# Patient Record
Sex: FEMALE | Race: WHITE | Employment: OTHER | URBAN - METROPOLITAN AREA
[De-identification: names, ages, dates, MRNs, and addresses within clinical notes are randomized per-mention and may not be internally consistent; named-entity substitution may affect disease eponyms.]

---

## 2022-10-10 ENCOUNTER — HOSPITAL ENCOUNTER (INPATIENT)
Age: 87
LOS: 4 days | Discharge: NURSING, REHAB OR LONG-TERM CARE | DRG: 483 | End: 2022-10-14
Attending: STUDENT IN AN ORGANIZED HEALTH CARE EDUCATION/TRAINING PROGRAM | Admitting: FAMILY MEDICINE
Payer: MEDICARE

## 2022-10-10 ENCOUNTER — APPOINTMENT (OUTPATIENT)
Dept: CT IMAGING | Age: 87
DRG: 483 | End: 2022-10-10
Payer: MEDICARE

## 2022-10-10 ENCOUNTER — APPOINTMENT (OUTPATIENT)
Dept: GENERAL RADIOLOGY | Age: 87
DRG: 483 | End: 2022-10-10
Payer: MEDICARE

## 2022-10-10 DIAGNOSIS — E87.1 HYPONATREMIA: Primary | ICD-10-CM

## 2022-10-10 DIAGNOSIS — S42.92XA CLOSED FRACTURE OF LEFT SHOULDER, INITIAL ENCOUNTER: ICD-10-CM

## 2022-10-10 PROBLEM — S42.213A: Status: ACTIVE | Noted: 2022-10-10

## 2022-10-10 LAB
A/G RATIO: 1.2 (ref 1.1–2.2)
ABO/RH: NORMAL
ALBUMIN SERPL-MCNC: 3.8 G/DL (ref 3.4–5)
ALP BLD-CCNC: 69 U/L (ref 40–129)
ALT SERPL-CCNC: 11 U/L (ref 10–40)
ANION GAP SERPL CALCULATED.3IONS-SCNC: 10 MMOL/L (ref 3–16)
ANTIBODY SCREEN: NORMAL
AST SERPL-CCNC: 16 U/L (ref 15–37)
BACTERIA: NORMAL /HPF
BASOPHILS ABSOLUTE: 0.1 K/UL (ref 0–0.2)
BASOPHILS RELATIVE PERCENT: 0.6 %
BILIRUB SERPL-MCNC: 0.4 MG/DL (ref 0–1)
BILIRUBIN URINE: NEGATIVE
BLOOD, URINE: NEGATIVE
BUN BLDV-MCNC: 15 MG/DL (ref 7–20)
CALCIUM SERPL-MCNC: 9.7 MG/DL (ref 8.3–10.6)
CHLORIDE BLD-SCNC: 91 MMOL/L (ref 99–110)
CLARITY: CLEAR
CO2: 25 MMOL/L (ref 21–32)
COLOR: YELLOW
CREAT SERPL-MCNC: <0.5 MG/DL (ref 0.6–1.2)
EKG ATRIAL RATE: 63 BPM
EKG DIAGNOSIS: NORMAL
EKG P AXIS: 51 DEGREES
EKG P-R INTERVAL: 220 MS
EKG Q-T INTERVAL: 490 MS
EKG QRS DURATION: 82 MS
EKG QTC CALCULATION (BAZETT): 501 MS
EKG R AXIS: -15 DEGREES
EKG T AXIS: 64 DEGREES
EKG VENTRICULAR RATE: 63 BPM
EOSINOPHILS ABSOLUTE: 0 K/UL (ref 0–0.6)
EOSINOPHILS RELATIVE PERCENT: 0.3 %
EPITHELIAL CELLS, UA: 2 /HPF (ref 0–5)
GFR AFRICAN AMERICAN: >60
GFR NON-AFRICAN AMERICAN: >60
GLUCOSE BLD-MCNC: 143 MG/DL (ref 70–99)
GLUCOSE URINE: NEGATIVE MG/DL
HCT VFR BLD CALC: 35.9 % (ref 36–48)
HEMOGLOBIN: 11.9 G/DL (ref 12–16)
HYALINE CASTS: 0 /LPF (ref 0–8)
INR BLD: 0.93 (ref 0.87–1.14)
KETONES, URINE: 15 MG/DL
LEUKOCYTE ESTERASE, URINE: ABNORMAL
LYMPHOCYTES ABSOLUTE: 1.5 K/UL (ref 1–5.1)
LYMPHOCYTES RELATIVE PERCENT: 15.3 %
MAGNESIUM: 1.8 MG/DL (ref 1.8–2.4)
MCH RBC QN AUTO: 29.3 PG (ref 26–34)
MCHC RBC AUTO-ENTMCNC: 33 G/DL (ref 31–36)
MCV RBC AUTO: 88.5 FL (ref 80–100)
MICROSCOPIC EXAMINATION: YES
MONOCYTES ABSOLUTE: 0.4 K/UL (ref 0–1.3)
MONOCYTES RELATIVE PERCENT: 4.2 %
NEUTROPHILS ABSOLUTE: 7.9 K/UL (ref 1.7–7.7)
NEUTROPHILS RELATIVE PERCENT: 79.6 %
NITRITE, URINE: NEGATIVE
PDW BLD-RTO: 14.5 % (ref 12.4–15.4)
PH UA: 6 (ref 5–8)
PLATELET # BLD: 199 K/UL (ref 135–450)
PMV BLD AUTO: 6.7 FL (ref 5–10.5)
POTASSIUM REFLEX MAGNESIUM: 4.1 MMOL/L (ref 3.5–5.1)
PROTEIN UA: NEGATIVE MG/DL
PROTHROMBIN TIME: 12.4 SEC (ref 11.7–14.5)
RBC # BLD: 4.06 M/UL (ref 4–5.2)
RBC UA: 1 /HPF (ref 0–4)
SODIUM BLD-SCNC: 126 MMOL/L (ref 136–145)
SODIUM BLD-SCNC: 127 MMOL/L (ref 136–145)
SPECIFIC GRAVITY UA: 1.02 (ref 1–1.03)
TOTAL CK: 100 U/L (ref 26–192)
TOTAL PROTEIN: 6.9 G/DL (ref 6.4–8.2)
TROPONIN: <0.01 NG/ML
URINE REFLEX TO CULTURE: ABNORMAL
URINE TYPE: ABNORMAL
UROBILINOGEN, URINE: 1 E.U./DL
WBC # BLD: 10 K/UL (ref 4–11)
WBC UA: 2 /HPF (ref 0–5)

## 2022-10-10 PROCEDURE — 85610 PROTHROMBIN TIME: CPT

## 2022-10-10 PROCEDURE — 86901 BLOOD TYPING SEROLOGIC RH(D): CPT

## 2022-10-10 PROCEDURE — 81001 URINALYSIS AUTO W/SCOPE: CPT

## 2022-10-10 PROCEDURE — 6360000002 HC RX W HCPCS: Performed by: NURSE PRACTITIONER

## 2022-10-10 PROCEDURE — 6360000002 HC RX W HCPCS: Performed by: FAMILY MEDICINE

## 2022-10-10 PROCEDURE — 70450 CT HEAD/BRAIN W/O DYE: CPT

## 2022-10-10 PROCEDURE — 86850 RBC ANTIBODY SCREEN: CPT

## 2022-10-10 PROCEDURE — 2580000003 HC RX 258: Performed by: FAMILY MEDICINE

## 2022-10-10 PROCEDURE — 73200 CT UPPER EXTREMITY W/O DYE: CPT

## 2022-10-10 PROCEDURE — 83735 ASSAY OF MAGNESIUM: CPT

## 2022-10-10 PROCEDURE — 99221 1ST HOSP IP/OBS SF/LOW 40: CPT | Performed by: NURSE PRACTITIONER

## 2022-10-10 PROCEDURE — 80053 COMPREHEN METABOLIC PANEL: CPT

## 2022-10-10 PROCEDURE — 73030 X-RAY EXAM OF SHOULDER: CPT

## 2022-10-10 PROCEDURE — 84484 ASSAY OF TROPONIN QUANT: CPT

## 2022-10-10 PROCEDURE — 36415 COLL VENOUS BLD VENIPUNCTURE: CPT

## 2022-10-10 PROCEDURE — 84295 ASSAY OF SERUM SODIUM: CPT

## 2022-10-10 PROCEDURE — 96374 THER/PROPH/DIAG INJ IV PUSH: CPT

## 2022-10-10 PROCEDURE — 72125 CT NECK SPINE W/O DYE: CPT

## 2022-10-10 PROCEDURE — 86900 BLOOD TYPING SEROLOGIC ABO: CPT

## 2022-10-10 PROCEDURE — 1200000000 HC SEMI PRIVATE

## 2022-10-10 PROCEDURE — 6360000002 HC RX W HCPCS: Performed by: STUDENT IN AN ORGANIZED HEALTH CARE EDUCATION/TRAINING PROGRAM

## 2022-10-10 PROCEDURE — 73502 X-RAY EXAM HIP UNI 2-3 VIEWS: CPT

## 2022-10-10 PROCEDURE — 85025 COMPLETE CBC W/AUTO DIFF WBC: CPT

## 2022-10-10 PROCEDURE — 99285 EMERGENCY DEPT VISIT HI MDM: CPT

## 2022-10-10 PROCEDURE — 93010 ELECTROCARDIOGRAM REPORT: CPT | Performed by: INTERNAL MEDICINE

## 2022-10-10 PROCEDURE — 6370000000 HC RX 637 (ALT 250 FOR IP): Performed by: STUDENT IN AN ORGANIZED HEALTH CARE EDUCATION/TRAINING PROGRAM

## 2022-10-10 PROCEDURE — 93005 ELECTROCARDIOGRAM TRACING: CPT | Performed by: STUDENT IN AN ORGANIZED HEALTH CARE EDUCATION/TRAINING PROGRAM

## 2022-10-10 PROCEDURE — 82550 ASSAY OF CK (CPK): CPT

## 2022-10-10 RX ORDER — SODIUM CHLORIDE 9 MG/ML
INJECTION, SOLUTION INTRAVENOUS PRN
Status: DISCONTINUED | OUTPATIENT
Start: 2022-10-10 | End: 2022-10-14 | Stop reason: HOSPADM

## 2022-10-10 RX ORDER — ACETAMINOPHEN 325 MG/1
650 TABLET ORAL ONCE
Status: COMPLETED | OUTPATIENT
Start: 2022-10-10 | End: 2022-10-10

## 2022-10-10 RX ORDER — ONDANSETRON 4 MG/1
4 TABLET, ORALLY DISINTEGRATING ORAL EVERY 8 HOURS PRN
Status: DISCONTINUED | OUTPATIENT
Start: 2022-10-10 | End: 2022-10-14 | Stop reason: HOSPADM

## 2022-10-10 RX ORDER — SODIUM CHLORIDE 0.9 % (FLUSH) 0.9 %
5-40 SYRINGE (ML) INJECTION EVERY 12 HOURS SCHEDULED
Status: DISCONTINUED | OUTPATIENT
Start: 2022-10-10 | End: 2022-10-14 | Stop reason: HOSPADM

## 2022-10-10 RX ORDER — POLYETHYLENE GLYCOL 3350 17 G/17G
17 POWDER, FOR SOLUTION ORAL DAILY PRN
Status: DISCONTINUED | OUTPATIENT
Start: 2022-10-10 | End: 2022-10-14 | Stop reason: HOSPADM

## 2022-10-10 RX ORDER — ACETAMINOPHEN 650 MG/1
650 SUPPOSITORY RECTAL EVERY 6 HOURS PRN
Status: DISCONTINUED | OUTPATIENT
Start: 2022-10-10 | End: 2022-10-14 | Stop reason: HOSPADM

## 2022-10-10 RX ORDER — ACETAMINOPHEN 325 MG/1
650 TABLET ORAL EVERY 6 HOURS PRN
Status: DISCONTINUED | OUTPATIENT
Start: 2022-10-10 | End: 2022-10-14 | Stop reason: HOSPADM

## 2022-10-10 RX ORDER — ENOXAPARIN SODIUM 100 MG/ML
40 INJECTION SUBCUTANEOUS DAILY
Status: DISCONTINUED | OUTPATIENT
Start: 2022-10-11 | End: 2022-10-11

## 2022-10-10 RX ORDER — MORPHINE SULFATE 2 MG/ML
2 INJECTION, SOLUTION INTRAMUSCULAR; INTRAVENOUS
Status: DISCONTINUED | OUTPATIENT
Start: 2022-10-10 | End: 2022-10-11

## 2022-10-10 RX ORDER — ONDANSETRON 2 MG/ML
4 INJECTION INTRAMUSCULAR; INTRAVENOUS EVERY 6 HOURS PRN
Status: DISCONTINUED | OUTPATIENT
Start: 2022-10-10 | End: 2022-10-14 | Stop reason: HOSPADM

## 2022-10-10 RX ORDER — MORPHINE SULFATE 2 MG/ML
2 INJECTION, SOLUTION INTRAMUSCULAR; INTRAVENOUS EVERY 4 HOURS PRN
Status: DISCONTINUED | OUTPATIENT
Start: 2022-10-10 | End: 2022-10-10

## 2022-10-10 RX ORDER — MORPHINE SULFATE 4 MG/ML
4 INJECTION, SOLUTION INTRAMUSCULAR; INTRAVENOUS
Status: DISCONTINUED | OUTPATIENT
Start: 2022-10-10 | End: 2022-10-11

## 2022-10-10 RX ORDER — MORPHINE SULFATE 4 MG/ML
4 INJECTION, SOLUTION INTRAMUSCULAR; INTRAVENOUS ONCE
Status: COMPLETED | OUTPATIENT
Start: 2022-10-10 | End: 2022-10-10

## 2022-10-10 RX ORDER — SODIUM CHLORIDE 0.9 % (FLUSH) 0.9 %
5-40 SYRINGE (ML) INJECTION PRN
Status: DISCONTINUED | OUTPATIENT
Start: 2022-10-10 | End: 2022-10-14 | Stop reason: HOSPADM

## 2022-10-10 RX ORDER — MORPHINE SULFATE 2 MG/ML
2 INJECTION, SOLUTION INTRAMUSCULAR; INTRAVENOUS
Status: DISCONTINUED | OUTPATIENT
Start: 2022-10-10 | End: 2022-10-10

## 2022-10-10 RX ADMIN — MORPHINE SULFATE 4 MG: 4 INJECTION, SOLUTION INTRAMUSCULAR; INTRAVENOUS at 23:09

## 2022-10-10 RX ADMIN — MORPHINE SULFATE 4 MG: 4 INJECTION, SOLUTION INTRAMUSCULAR; INTRAVENOUS at 16:50

## 2022-10-10 RX ADMIN — MORPHINE SULFATE 2 MG: 2 INJECTION, SOLUTION INTRAMUSCULAR; INTRAVENOUS at 20:24

## 2022-10-10 RX ADMIN — Medication 10 ML: at 20:30

## 2022-10-10 RX ADMIN — ACETAMINOPHEN 650 MG: 325 TABLET ORAL at 14:11

## 2022-10-10 ASSESSMENT — PAIN - FUNCTIONAL ASSESSMENT
PAIN_FUNCTIONAL_ASSESSMENT: 0-10
PAIN_FUNCTIONAL_ASSESSMENT: PREVENTS OR INTERFERES SOME ACTIVE ACTIVITIES AND ADLS

## 2022-10-10 ASSESSMENT — PAIN DESCRIPTION - ORIENTATION
ORIENTATION: LEFT
ORIENTATION: LEFT;RIGHT
ORIENTATION: LEFT
ORIENTATION: LEFT

## 2022-10-10 ASSESSMENT — ENCOUNTER SYMPTOMS
SHORTNESS OF BREATH: 0
VOMITING: 0
SORE THROAT: 0
NAUSEA: 0
SINUS PRESSURE: 0
ABDOMINAL PAIN: 0
COUGH: 0
PHOTOPHOBIA: 0

## 2022-10-10 ASSESSMENT — PAIN DESCRIPTION - LOCATION
LOCATION: SHOULDER
LOCATION: HIP;SHOULDER
LOCATION: KNEE;ARM
LOCATION: SHOULDER

## 2022-10-10 ASSESSMENT — PAIN SCALES - GENERAL
PAINLEVEL_OUTOF10: 2
PAINLEVEL_OUTOF10: 9
PAINLEVEL_OUTOF10: 8
PAINLEVEL_OUTOF10: 9
PAINLEVEL_OUTOF10: 3
PAINLEVEL_OUTOF10: 8

## 2022-10-10 ASSESSMENT — PAIN DESCRIPTION - FREQUENCY: FREQUENCY: CONTINUOUS

## 2022-10-10 ASSESSMENT — PAIN DESCRIPTION - PAIN TYPE: TYPE: ACUTE PAIN

## 2022-10-10 ASSESSMENT — PAIN DESCRIPTION - ONSET: ONSET: ON-GOING

## 2022-10-10 ASSESSMENT — PAIN DESCRIPTION - DESCRIPTORS
DESCRIPTORS: ACHING
DESCRIPTORS: ACHING

## 2022-10-10 NOTE — CONSULTS
[facility]  Consult Note    Patient: Narciso Yuen  Admit Date: 10/10/2022  Requesting Physician: Raymund Hashimoto, MD  Room: ED-0015/15    Chief complaint: Left shoulder pain    HPI: Narciso Yuen is a 80 y.o. female who presented to [facility] ER today with complaints of left shoulder pain after she fell in her bathroom. Her daughter is at bedside today who found her on the floor. Describes pain in the left shoulder of moderate to severe intensity and of sharp nature since the fall earlier today which is relieved by fentanyl partially. Denies new numbness/tingling. Imaging review of left shoulder via plain films and CT scan demonstrated: A displaced fracture of the surgical neck involving the greater tuberosity. Patient lives in a private one-story home with her daughter and uses a walker to ambulate. She is right-hand dominant  Takes ASA 81 mg daily. She is a history of hyponatremia and follows with Dr. Gavin Upton. Sodium 126 today. Medical History:  Past Medical History:   Diagnosis Date    CHF (congestive heart failure) ([facility] [address]     Hypertension      Past Surgical History:   Procedure Laterality Date     SECTION      CHOLECYSTECTOMY         Social History:    reports that she has never smoked. She has never used smokeless tobacco.    Family History:  History reviewed. No pertinent family history. Medications:  ALL MEDICATIONS HAVE BEEN REVIEWED:  Scheduled:   tetanus & diphtheria toxoids (adult)  0.5 mL IntraMUSCular Once     Continuous:  PRN:morphine    Allergies:    Allergies   Allergen Reactions    Azithromycin Hives and Other (See Comments)    Hydrocodone-Acetaminophen Diarrhea, Nausea Only, Other (See Comments) and Nausea And Vomiting    Strawberry (Diagnostic) Hives    Adhesive Tape Other (See Comments)    Strawberry Other (See Comments)    Tramadol Nausea Only and Other (See Comments)     \"Turned patient into a zombie\"  Turned pt into a zombie Review of Systems:  Constitutional: Negative for fever, chills, fatigue. Skin:  Negative for pruritis, rash  Eyes: Negative for photophobia and visual disturbance. ENT:  Negative for rhinorrhea, epistaxis, sore throat  Respiratory:  Negative for cough and shortness of breath. Cardiovascular: Negative for chest pain. Gastrointestinal: Negative for nausea, vomiting, diarrhea. Genitourinary: Negative for dysuria and difficulty urinating. Neurological: Negative for confusion, dysarthria, tremors, seizures. Psychiatric:  Negative for depression or anxiety  Musculoskeletal:  Positive for left shoulder pain    Objective:  Vitals:    10/10/22 1415   BP:    Pulse: 65   Resp: 21   Temp:    SpO2: 94%      Physical Examination:  GENERAL: No apparent distress, well-nourished  SKIN:  Warm and dry  EYES: Nonicteric. ENT: Mucous membranes moist  HEAD: Normocephalic, atraumatic  RESPIRATORY: Resp easy and unlabored  CARDIOVASCULAR: Regular rate and rhythm  GI: Abdomen soft, nontender  NEURO: Awake and alert. No speech defect  PSYCHIATRIC: Appropriate affect; not agitated  MUSCULOSKELETAL: Left shoulder  Inspection: On exam there are no ulcerations, rashes or lesions about the left shoulder. There is pain to palpation of the left shoulder. There is ecchymosis noted anteriorly to the left shoulder with obvious swelling. No open areas or abrasions noted. Compartments are all soft and compressible. Motor: Intact left wrist flexion extension and flexion extension of all digits as well as thumb abduction on the left. Shoulder range of motion deferred. Sling in place. Sensation: Grossly intact to light touch throughout the left upper extremity in all nerve distributions. Vascular: 2+ left radial pulse.     Labs reviewed:  Recent Labs     10/10/22  1400   WBC 10.0   HGB 11.9*   HCT 35.9*        Recent Labs     10/10/22  1400   *   K 4.1   CL 91*   CO2 25   BUN 15   CREATININE <0.5*   GLUCOSE 143* Expect SNF postoperatively. Patient denies tobacco use. I have reviewed imaging and plan with Dr. Lady Sanitago.       Kailey Payment, APRN - CNP  10/10/2022  3:29 PM

## 2022-10-10 NOTE — ED NOTES
Assumed care of pt, pt medicated with tylenol  Swelling and bruising noted to left shoulder after a fall  Pt AAO x 3. Daughter at bedside.       Emily Shah RN  10/10/22 7349

## 2022-10-11 LAB
ANION GAP SERPL CALCULATED.3IONS-SCNC: 11 MMOL/L (ref 3–16)
ANION GAP SERPL CALCULATED.3IONS-SCNC: 8 MMOL/L (ref 3–16)
BUN BLDV-MCNC: 10 MG/DL (ref 7–20)
BUN BLDV-MCNC: 12 MG/DL (ref 7–20)
CALCIUM SERPL-MCNC: 9.2 MG/DL (ref 8.3–10.6)
CALCIUM SERPL-MCNC: 9.4 MG/DL (ref 8.3–10.6)
CHLORIDE BLD-SCNC: 90 MMOL/L (ref 99–110)
CHLORIDE BLD-SCNC: 92 MMOL/L (ref 99–110)
CO2: 25 MMOL/L (ref 21–32)
CO2: 27 MMOL/L (ref 21–32)
CREAT SERPL-MCNC: <0.5 MG/DL (ref 0.6–1.2)
CREAT SERPL-MCNC: <0.5 MG/DL (ref 0.6–1.2)
GFR AFRICAN AMERICAN: >60
GFR AFRICAN AMERICAN: >60
GFR NON-AFRICAN AMERICAN: >60
GFR NON-AFRICAN AMERICAN: >60
GLUCOSE BLD-MCNC: 102 MG/DL (ref 70–99)
GLUCOSE BLD-MCNC: 95 MG/DL (ref 70–99)
HCT VFR BLD CALC: 32.3 % (ref 36–48)
HEMOGLOBIN: 10.6 G/DL (ref 12–16)
MCH RBC QN AUTO: 29.4 PG (ref 26–34)
MCHC RBC AUTO-ENTMCNC: 32.7 G/DL (ref 31–36)
MCV RBC AUTO: 90 FL (ref 80–100)
PDW BLD-RTO: 14.3 % (ref 12.4–15.4)
PLATELET # BLD: 188 K/UL (ref 135–450)
PMV BLD AUTO: 6.8 FL (ref 5–10.5)
POTASSIUM SERPL-SCNC: 4.1 MMOL/L (ref 3.5–5.1)
POTASSIUM SERPL-SCNC: 4.8 MMOL/L (ref 3.5–5.1)
RBC # BLD: 3.59 M/UL (ref 4–5.2)
SODIUM BLD-SCNC: 125 MMOL/L (ref 136–145)
SODIUM BLD-SCNC: 128 MMOL/L (ref 136–145)
WBC # BLD: 6.2 K/UL (ref 4–11)

## 2022-10-11 PROCEDURE — 94760 N-INVAS EAR/PLS OXIMETRY 1: CPT

## 2022-10-11 PROCEDURE — 6370000000 HC RX 637 (ALT 250 FOR IP): Performed by: INTERNAL MEDICINE

## 2022-10-11 PROCEDURE — 85027 COMPLETE CBC AUTOMATED: CPT

## 2022-10-11 PROCEDURE — 6360000002 HC RX W HCPCS: Performed by: FAMILY MEDICINE

## 2022-10-11 PROCEDURE — 6360000002 HC RX W HCPCS: Performed by: NURSE PRACTITIONER

## 2022-10-11 PROCEDURE — APPNB45 APP NON BILLABLE 31-45 MINUTES: Performed by: NURSE PRACTITIONER

## 2022-10-11 PROCEDURE — 1200000000 HC SEMI PRIVATE

## 2022-10-11 PROCEDURE — 6370000000 HC RX 637 (ALT 250 FOR IP): Performed by: NURSE PRACTITIONER

## 2022-10-11 PROCEDURE — 80048 BASIC METABOLIC PNL TOTAL CA: CPT

## 2022-10-11 PROCEDURE — 2580000003 HC RX 258: Performed by: FAMILY MEDICINE

## 2022-10-11 PROCEDURE — 36415 COLL VENOUS BLD VENIPUNCTURE: CPT

## 2022-10-11 RX ORDER — ACETAMINOPHEN 500 MG
1000 TABLET ORAL 3 TIMES DAILY
Status: DISCONTINUED | OUTPATIENT
Start: 2022-10-11 | End: 2022-10-14 | Stop reason: HOSPADM

## 2022-10-11 RX ORDER — OXYCODONE HYDROCHLORIDE 5 MG/1
5 TABLET ORAL EVERY 4 HOURS PRN
Status: DISCONTINUED | OUTPATIENT
Start: 2022-10-11 | End: 2022-10-14

## 2022-10-11 RX ORDER — MORPHINE SULFATE 2 MG/ML
2 INJECTION, SOLUTION INTRAMUSCULAR; INTRAVENOUS EVERY 4 HOURS PRN
Status: DISCONTINUED | OUTPATIENT
Start: 2022-10-11 | End: 2022-10-14

## 2022-10-11 RX ORDER — POTASSIUM CHLORIDE 750 MG/1
10 CAPSULE, EXTENDED RELEASE ORAL DAILY
Status: ON HOLD | COMMUNITY
End: 2022-10-25 | Stop reason: HOSPADM

## 2022-10-11 RX ORDER — SODIUM CHLORIDE 1000 MG
1 TABLET, SOLUBLE MISCELLANEOUS
Status: DISCONTINUED | OUTPATIENT
Start: 2022-10-11 | End: 2022-10-12

## 2022-10-11 RX ORDER — MAGNESIUM OXIDE 400 MG/1
400 TABLET ORAL 2 TIMES DAILY
COMMUNITY

## 2022-10-11 RX ORDER — ASPIRIN 81 MG/1
81 TABLET ORAL DAILY
COMMUNITY

## 2022-10-11 RX ORDER — DOCUSATE SODIUM 100 MG/1
100 CAPSULE, LIQUID FILLED ORAL 2 TIMES DAILY
COMMUNITY

## 2022-10-11 RX ORDER — CETIRIZINE HYDROCHLORIDE 10 MG/1
5 TABLET ORAL DAILY
Status: DISCONTINUED | OUTPATIENT
Start: 2022-10-11 | End: 2022-10-14 | Stop reason: HOSPADM

## 2022-10-11 RX ORDER — LORATADINE 10 MG/1
10 TABLET ORAL DAILY
COMMUNITY
Start: 2020-06-11

## 2022-10-11 RX ORDER — LIDOCAINE 50 MG/G
1 PATCH TOPICAL DAILY
Status: ON HOLD | COMMUNITY
End: 2022-10-25 | Stop reason: HOSPADM

## 2022-10-11 RX ORDER — MORPHINE SULFATE 4 MG/ML
4 INJECTION, SOLUTION INTRAMUSCULAR; INTRAVENOUS EVERY 4 HOURS PRN
Status: DISCONTINUED | OUTPATIENT
Start: 2022-10-11 | End: 2022-10-14

## 2022-10-11 RX ORDER — NIFEDIPINE 30 MG/1
30 TABLET, FILM COATED, EXTENDED RELEASE ORAL DAILY
Status: DISCONTINUED | OUTPATIENT
Start: 2022-10-11 | End: 2022-10-14

## 2022-10-11 RX ORDER — VITAMIN B COMPLEX
1 CAPSULE ORAL DAILY
COMMUNITY

## 2022-10-11 RX ORDER — NIFEDIPINE 30 MG/1
30 TABLET, FILM COATED, EXTENDED RELEASE ORAL DAILY
COMMUNITY

## 2022-10-11 RX ORDER — FUROSEMIDE 20 MG/1
20 TABLET ORAL DAILY PRN
Status: ON HOLD | COMMUNITY
End: 2022-10-14 | Stop reason: HOSPADM

## 2022-10-11 RX ORDER — LORATADINE 10 MG/1
10 TABLET ORAL DAILY
Status: DISCONTINUED | OUTPATIENT
Start: 2022-10-11 | End: 2022-10-11 | Stop reason: CLARIF

## 2022-10-11 RX ADMIN — CETIRIZINE HYDROCHLORIDE 5 MG: 10 TABLET, FILM COATED ORAL at 12:05

## 2022-10-11 RX ADMIN — SODIUM CHLORIDE TAB 1 GM 1 G: 1 TAB at 17:45

## 2022-10-11 RX ADMIN — MORPHINE SULFATE 4 MG: 4 INJECTION, SOLUTION INTRAMUSCULAR; INTRAVENOUS at 10:44

## 2022-10-11 RX ADMIN — ACETAMINOPHEN 1000 MG: 500 TABLET ORAL at 13:52

## 2022-10-11 RX ADMIN — NIFEDIPINE 30 MG: 30 TABLET, EXTENDED RELEASE ORAL at 13:52

## 2022-10-11 RX ADMIN — ONDANSETRON 4 MG: 2 INJECTION INTRAMUSCULAR; INTRAVENOUS at 11:41

## 2022-10-11 RX ADMIN — Medication 10 ML: at 08:10

## 2022-10-11 RX ADMIN — OXYCODONE 5 MG: 5 TABLET ORAL at 15:21

## 2022-10-11 RX ADMIN — Medication 10 ML: at 21:35

## 2022-10-11 RX ADMIN — MORPHINE SULFATE 4 MG: 4 INJECTION, SOLUTION INTRAMUSCULAR; INTRAVENOUS at 05:05

## 2022-10-11 RX ADMIN — SODIUM CHLORIDE TAB 1 GM 1 G: 1 TAB at 12:05

## 2022-10-11 ASSESSMENT — PAIN DESCRIPTION - LOCATION
LOCATION: ARM
LOCATION: SHOULDER
LOCATION: SHOULDER;ARM;HIP
LOCATION: SHOULDER
LOCATION: ARM
LOCATION: SHOULDER
LOCATION: SHOULDER

## 2022-10-11 ASSESSMENT — ENCOUNTER SYMPTOMS
SHORTNESS OF BREATH: 0
SORE THROAT: 0
EYE PAIN: 0
ABDOMINAL PAIN: 0
COUGH: 0
WHEEZING: 0
BACK PAIN: 0
EYE REDNESS: 0
VOMITING: 0
DIARRHEA: 0
PHOTOPHOBIA: 0
NAUSEA: 0
CONSTIPATION: 0

## 2022-10-11 ASSESSMENT — PAIN DESCRIPTION - ORIENTATION
ORIENTATION: LEFT
ORIENTATION: LEFT;RIGHT
ORIENTATION: LEFT

## 2022-10-11 ASSESSMENT — PAIN SCALES - GENERAL
PAINLEVEL_OUTOF10: 7
PAINLEVEL_OUTOF10: 5
PAINLEVEL_OUTOF10: 9
PAINLEVEL_OUTOF10: 8
PAINLEVEL_OUTOF10: 0
PAINLEVEL_OUTOF10: 7

## 2022-10-11 ASSESSMENT — PAIN DESCRIPTION - DESCRIPTORS
DESCRIPTORS: ACHING
DESCRIPTORS: STABBING
DESCRIPTORS: ACHING

## 2022-10-11 ASSESSMENT — PAIN DESCRIPTION - PAIN TYPE: TYPE: CHRONIC PAIN;ACUTE PAIN

## 2022-10-11 NOTE — H&P
HOSPITALISTS HISTORY AND PHYSICAL    10/10/22    Patient Information:  Desiree Cheema is a 80 y.o. female [id number]  PCP:  Lennox Goodrich MD (Tel: [phone] )    Chief complaint:    Chief Complaint   Patient presents with    Fall     Pt in via [facility] from home. Pt states she fell walking in to her bathroom, she states her daughter tried to call her and when she didn't answer she wet to check on her and found her down. Pt complains of left shoulder pain, right hip pain. Denies LOC or hitting head. She states she takes a baby aspirin daily. History of Present Illness:  Helena Morales is a 80 y.o. female with h/o CHF,HTN , chronic hyponatremia, SIADH presented after sustaining a fall while going to the bathroom. States her legs gave out . Denies hitting her head or LOC. She was unable to get herself off the floor. And laid there for an hour or so until her daughter checked up on her and got help. She has pain in her left shoulder and right hip. She has recently completed a course of oral antibiotics for UTI  Xrays revealed   Comminuted fracture of the left humeral neck with the humeral shaft   displaced 1 shaft with anteriorly in relation to the humeral head. Humeral   head to glenoid alignment appears to remain intact. 2.  Widening of the subacromial space could suggest shoulder joint effusion   or hemarthrosis. CT head is neg for hemorrhage or fracture  Labs revealed hyponatremia 126. Baseline sodium runs around 130    REVIEW OF SYSTEMS:   Constitutional: Negative for fever,chills or night sweats  ENT: Negative for rhinorrhea, epistaxis, hoarseness, sore throat.   Respiratory: Negative for shortness of breath,wheezing  Cardiovascular: Negative for chest pain, palpitations   Gastrointestinal: Negative for nausea, vomiting, diarrhea  Genitourinary: Negative for polyuria, dysuria   Hematologic/Lymphatic: Negative for bleeding tendency, easy bruising  Musculoskeletal: Negative for myalgias and arthralgias  Neurologic: Negative for confusion,dysarthria. +ve fall  Skin: Negative for itching,rash  Psychiatric: Negative for depression,anxiety, agitation. Endocrine: Negative for polydipsia,polyuria,heat /cold intolerance. Past Medical History:   has a past medical history of CHF (congestive heart failure) ([address] and Hypertension. Past Surgical History:   has a past surgical history that includes Cholecystectomy and  section. Medications:  No current facility-administered medications on file prior to encounter. No current outpatient medications on file prior to encounter.      Current Facility-Administered Medications   Medication Dose Route Frequency Provider Last Rate Last Admin    tetanus & diphtheria toxoids (adult) 5-2 LFU injection 0.5 mL  0.5 mL IntraMUSCular Once Kelli Bejarano MD        sodium chloride flush 0.9 % injection 5-40 mL  5-40 mL IntraVENous 2 times per day Claudeen Meter, MD   10 mL at 10/10/22 2030    sodium chloride flush 0.9 % injection 5-40 mL  5-40 mL IntraVENous PRN Racheal Figueroa MD        0.9 % sodium chloride infusion   IntraVENous PRN Claudeen Meter, MD        enoxaparin (LOVENOX) injection 40 mg  40 mg SubCUTAneous Daily Racheal Figueroa MD        ondansetron (ZOFRAN-ODT) disintegrating tablet 4 mg  4 mg Oral Q8H PRN Claudeen Meter, MD        Or    ondansetron (ZOFRAN) injection 4 mg  4 mg IntraVENous Q6H PRN Claudeen Meter, MD        polyethylene glycol (GLYCOLAX) packet 17 g  17 g Oral Daily PRN Claudeen Meter, MD        acetaminophen (TYLENOL) tablet 650 mg  650 mg Oral Q6H PRN Claudeen Meter, MD        Or    acetaminophen (TYLENOL) suppository 650 mg  650 mg Rectal Q6H PRN Claudeen Meter, MD        morphine (PF) injection 2 mg  2 mg IntraVENous Q3H PRN ADELINA Martinez CNP        Or    morphine injection 4 mg  4 mg IntraVENous Q3H RATNA Garcia, APRN - CNP   4 mg at 10/10/22 9581      Allergies: Allergies   Allergen Reactions    Azithromycin Hives and Other (See Comments)    Hydrocodone-Acetaminophen Diarrhea, Nausea Only, Other (See Comments) and Nausea And Vomiting    Strawberry (Diagnostic) Hives    Adhesive Tape Other (See Comments)    Strawberry Other (See Comments)    Tramadol Nausea Only and Other (See Comments)     \"Turned patient into a zombie\"  Turned pt into a zombie          Social History:  Patient Lives    reports that she has never smoked. She has never used smokeless tobacco. She reports that she does not currently use alcohol. She reports that she does not use drugs. Family History:  family history is not on file. ,     Physical Exam:  BP (!) 155/88   Pulse 66   Temp 97.1 °F (36.2 °C) (Oral)   Resp 17   Ht 5' 5\" (1.651 m)   Wt 155 lb (70.3 kg)   SpO2 94%   BMI 25.79 kg/m²     General appearance:  Appears comfortable. Well nourished  Eyes: Sclera clear, pupils equal  ENT: Moist mucus membranes, no thrush. Trachea midline. Cardiovascular: Regular rhythm, normal S1, S2. No murmur, gallop, rub. No edema in lower extremities  Respiratory: Clear to auscultation bilaterally, no wheeze, good inspiratory effort  Gastrointestinal: Abdomen soft, non-tender, not distended, normal bowel sounds  Musculoskeletal: No cyanosis in digits, neck supple  Neurology: Cranial nerves grossly intact. Alert and oriented in time, place and person. No speech or motor deficits  Psychiatry: Appropriate affect.  Not agitated  Skin: Warm, dry, normal turgor, no rash  Brisk capillary refill, peripheral pulses palpable   Labs:  CBC:   Lab Results   Component Value Date/Time    WBC 10.0 10/10/2022 02:00 PM    RBC 4.06 10/10/2022 02:00 PM    HGB 11.9 10/10/2022 02:00 PM    HCT 35.9 10/10/2022 02:00 PM    MCV 88.5 10/10/2022 02:00 PM    MCH 29.3 10/10/2022 02:00 PM    MCHC 33.0 10/10/2022 02:00 PM    RDW 14.5 10/10/2022 02:00 PM     10/10/2022 02:00 PM    MPV 6.7 10/10/2022 02:00 PM     BMP:    Lab Results   Component Value Date/Time     10/10/2022 04:43 PM    K 4.1 10/10/2022 02:00 PM    CL 91 10/10/2022 02:00 PM    CO2 25 10/10/2022 02:00 PM    BUN 15 10/10/2022 02:00 PM    CREATININE <0.5 10/10/2022 02:00 PM    CALCIUM 9.7 10/10/2022 02:00 PM    GFRAA >60 10/10/2022 02:00 PM    LABGLOM >60 10/10/2022 02:00 PM    GLUCOSE 143 10/10/2022 02:00 PM     CT SHOULDER LEFT WO CONTRAST   Final Result   1. Acute and displaced fracture of the proximal left humerus involving the   surgical neck and greater tuberosity with foreshortening of the humerus by   approximately 4-5 cm. Prominent surrounding soft tissue and intramuscular   edema at the fracture site. 2. Osteopenia. XR SHOULDER LEFT (MIN 2 VIEWS)   Final Result      1. Comminuted fracture of the left humeral neck with the humeral shaft   displaced 1 shaft with anteriorly in relation to the humeral head. Humeral   head to glenoid alignment appears to remain intact. 2.  Widening of the subacromial space could suggest shoulder joint effusion   or hemarthrosis. XR HIP 2-3 VW W PELVIS RIGHT   Final Result      Osteoarthritic change of the left hip and scoliosis and degenerative change   of the spine. No evidence of fracture. CT Head W/O Contrast   Final Result      1. No evidence of acute intracranial process. 2.  Findings of presumed small vessel ischemic deep white matter disease. 3.  Chronic sinusitis versus mucous retention cysts within both sphenoid   sinuses. CT CSpine W/O Contrast   Final Result      No evidence of fracture with degenerative changes as described. Chest Xray:   EKG:    I visualized CXR images and EKG strips    Discussed case  with     Problem List  Principal Problem:    Closed displaced fracture of surgical neck of humerus, initial encounter  Resolved Problems:    * No resolved hospital problems.  * Assessment/Plan:     Displaced fracture of humeral neck and shaft  Shoulder xray concerning for shoulder effusion vs hemarthrosis  Pain control   Ortho has been consulted   NPO past midnight  Holding anticoagulation   Hb 11.9, Cont to monitor      Hyponatremia, with h/o SIADH  Na 126 baseline is around 130  Nephrology consulted       DVT prophylaxis   Code status   Diet   IV access   Denise Catheter    Admit as inpatient. I anticipate hospitalization spanning more than two midnights for investigation and treatment of the above medically necessary diagnoses. Please note that some part of this chart was generated using Dragon dictation software. Although every effort was made to ensure the accuracy of this automated transcription, some errors in transcription may have occurred inadvertently. If you may need any clarification, please do not hesitate to contact me through [facility].        Juan Barr MD    10/10/22

## 2022-10-11 NOTE — CONSULTS
Nephrology Consult Note  [phone]   [facility]. [facility]           Reason for Consult: Hyponatremia    HISTORY OF PRESENT ILLNESS:                The patient is a 80 y. o.female with significant past medical history of Hyponatremia chronic, Hypertension, CHF brought in after a fall. Labs showed hyponatremia with a sodium of 126 and so we are consulted for further evaluation and management. Pt seen and examined  Says her legs gave away and so she fell, denied any prodromal symptoms leading to the fall including chest pains/sob/palpitations/diaphoresis, denies LOC or head injury. She follows with Dr. Erna Reyna for hyponatremia  Says she has been taking salt tabs but not as prescribed  Denies nausea/emesis or diarrhea  Daughter at the bedside  CT of the head and Cspine with no acute changes  Xray of the left shoulder with comminuted fracture of the left humeral neck  Seen by orthopedics and there are plans for left reverse total shoulder arthroplasty     Past Medical History:        Diagnosis Date    CHF (congestive heart failure) ([facility] [address]     Hypertension        Past Surgical History:        Procedure Laterality Date     SECTION      CHOLECYSTECTOMY           Current Medications:    No current facility-administered medications on file prior to encounter.      Current Outpatient Medications on File Prior to Encounter   Medication Sig Dispense Refill    loratadine (CLARITIN) 10 MG tablet Take 10 mg by mouth daily      magnesium oxide (MAG-OX) 400 MG tablet Take 400 mg by mouth 2 times daily Take it with meal      furosemide (LASIX) 20 MG tablet Take 20 mg by mouth daily as needed As needed for swelling      docusate sodium (COLACE) 100 MG capsule Take 100 mg by mouth 2 times daily      aspirin 81 MG EC tablet Take 81 mg by mouth daily      b complex vitamins capsule Take 1 capsule by mouth daily      potassium chloride (MICRO-K) 10 MEQ extended release capsule Take 10 mEq by mouth daily      NIFEdipine (ADALAT CC) 30 MG extended release tablet Take 30 mg by mouth daily      lidocaine (LIDODERM) 5 % Place 1 patch onto the skin daily 12 hours on, 12 hours off. Allergies:  Azithromycin, Hydrocodone-acetaminophen, Strawberry (diagnostic), Adhesive tape, Strawberry, and Tramadol    Social History:    Social History     Socioeconomic History    Marital status:      Spouse name: Not on file    Number of children: Not on file    Years of education: Not on file    Highest education level: Not on file   Occupational History    Not on file   Tobacco Use    Smoking status: Never    Smokeless tobacco: Never   Substance and Sexual Activity    Alcohol use: Not Currently    Drug use: Never    Sexual activity: Not Currently   Other Topics Concern    Not on file   Social History Narrative    Not on file     Social Determinants of Health     Financial Resource Strain: Not on file   Food Insecurity: Not on file   Transportation Needs: Not on file   Physical Activity: Not on file   Stress: Not on file   Social Connections: Not on file   Intimate Partner Violence: Not on file   Housing Stability: Not on file       Family History:   History reviewed. No pertinent family history. Review of Systems   Constitutional:  Positive for fatigue. Negative for activity change, appetite change, chills and fever. HENT:  Positive for hearing loss. Negative for ear discharge, ear pain, sneezing and sore throat. Eyes:  Negative for photophobia, pain and redness. Respiratory:  Negative for cough, shortness of breath and wheezing. Cardiovascular:  Positive for leg swelling. Negative for chest pain and palpitations. Gastrointestinal:  Negative for abdominal pain, constipation, diarrhea, nausea and vomiting. Genitourinary:  Negative for difficulty urinating, dysuria, frequency and urgency. Musculoskeletal:  Positive for arthralgias. Negative for back pain and myalgias. Skin:  Negative for pallor and rash. Neurological:  Negative for tremors, seizures, syncope and headaches. Psychiatric/Behavioral:  Negative for confusion and hallucinations. PHYSICAL EXAM:      Vitals:    BP (!) 154/80   Pulse 64   Temp 97.5 °F (36.4 °C) (Oral)   Resp 18   Ht 5' 5\" (1.651 m)   Wt 165 lb (74.8 kg)   SpO2 94%   BMI 27.46 kg/m²   I/O last 3 completed shifts:  In: -   Out: 600 [Urine:600]  I/O this shift:  In: 100 [P.O.:90; I.V.:10]  Out: -     Physical Exam:  General : AAOx3, not in pain or respiratory distress, resting in bed  HEENT : normocephalic, atraumatic, mucosa moist, no palor or icterus  CVS: S1 S2 normal, regular rhythm, no murmurs or rubs. Lungs: Clear, no wheezing or crackles. Abd: Soft, bowel sounds normal, non-tender. Ext: b/l LE edema+  Skin: Warm. No rashes appreciated. : bladder non-distended, no tenderness over the bladder  Neuro: Alert and oriented x 3, nonfocal.  Joints: No erythema noted over joints.     DATA:    CBC with Differential:    Lab Results   Component Value Date/Time    WBC 6.2 10/11/2022 05:43 AM    RBC 3.59 10/11/2022 05:43 AM    HGB 10.6 10/11/2022 05:43 AM    HCT 32.3 10/11/2022 05:43 AM     10/11/2022 05:43 AM    MCV 90.0 10/11/2022 05:43 AM    MCH 29.4 10/11/2022 05:43 AM    MCHC 32.7 10/11/2022 05:43 AM    RDW 14.3 10/11/2022 05:43 AM    LYMPHOPCT 15.3 10/10/2022 02:00 PM    MONOPCT 4.2 10/10/2022 02:00 PM    BASOPCT 0.6 10/10/2022 02:00 PM    MONOSABS 0.4 10/10/2022 02:00 PM    LYMPHSABS 1.5 10/10/2022 02:00 PM    EOSABS 0.0 10/10/2022 02:00 PM    BASOSABS 0.1 10/10/2022 02:00 PM     BMP:    Lab Results   Component Value Date/Time     10/11/2022 05:43 AM    K 4.8 10/11/2022 05:43 AM    K 4.1 10/10/2022 02:00 PM    CL 92 10/11/2022 05:43 AM    CO2 25 10/11/2022 05:43 AM    BUN 12 10/11/2022 05:43 AM    LABALBU 3.8 10/10/2022 02:00 PM    CREATININE <0.5 10/11/2022 05:43 AM    CALCIUM 9.2 10/11/2022 05:43 AM    GFRAA >60 10/11/2022 05:43 AM    LABGLOM >60 10/11/2022 05:43 AM    GLUCOSE 95 10/11/2022 05:43 AM     Ionized Calcium:  No results found for: IONCA  Magnesium:    Lab Results   Component Value Date/Time    MG 1.80 10/10/2022 02:00 PM     Phosphorus:  No results found for: PHOS  U/A:    Lab Results   Component Value Date/Time    COLORU Yellow 10/10/2022 07:00 PM    PROTEINU Negative 10/10/2022 07:00 PM    PHUR 6.0 10/10/2022 07:00 PM    WBCUA 2 10/10/2022 07:00 PM    RBCUA 1 10/10/2022 07:00 PM    BACTERIA None Seen 10/10/2022 07:00 PM    CLARITYU Clear 10/10/2022 07:00 PM    SPECGRAV 1.025 10/10/2022 07:00 PM    LEUKOCYTESUR SMALL 10/10/2022 07:00 PM    UROBILINOGEN 1.0 10/10/2022 07:00 PM    BILIRUBINUR Negative 10/10/2022 07:00 PM    BLOODU Negative 10/10/2022 07:00 PM    GLUCOSEU Negative 10/10/2022 07:00 PM       ASSESSMENT/PLAN:      Hyponatremia improving  Acute on chronic  Check urine sodium and urine osmolality  Will optimize sodium for anticipated surgery   Keep on fluid restriction  Resume salt tabs  Hold diuretics  BMP later today  Hypertension not very well controlled  Resume Nifedipine XL  Left shoulder fracture   S/p Fall       Thank you for allowing me to participate in the care of this patient. I will continue to follow along. Please call with questions.     Luz Kim MD, MD.  Office Phone : [phone]  10/11/2022

## 2022-10-11 NOTE — PLAN OF CARE
Problem: Discharge Planning  Goal: Discharge to home or other facility with appropriate resources  Outcome: Progressing  Flowsheets (Taken 10/10/2022 1941 by Sherry Craft RN)  Discharge to home or other facility with appropriate resources: Identify barriers to discharge with patient and caregiver     Problem: Pain  Goal: Verbalizes/displays adequate comfort level or baseline comfort level  Outcome: Progressing     Problem: Skin/Tissue Integrity  Goal: Absence of new skin breakdown  Description: 1. Monitor for areas of redness and/or skin breakdown  2. Assess vascular access sites hourly  3. Every 4-6 hours minimum:  Change oxygen saturation probe site  4. Every 4-6 hours:  If on nasal continuous positive airway pressure, respiratory therapy assess nares and determine need for appliance change or resting period.   Outcome: Progressing     Problem: Safety - Adult  Goal: Free from fall injury  Outcome: Progressing  Flowsheets (Taken 10/11/2022 0115)  Free From Fall Injury: Instruct family/caregiver on patient safety     Problem: ABCDS Injury Assessment  Goal: Absence of physical injury  Outcome: Progressing

## 2022-10-11 NOTE — PROGRESS NOTES
MD Amanda Knowles MD Cleo Monas, MD                  Office: [phone]                 Fax: [phone]         [facility]                              PATIENT NAME: Mohan Gallagher  : 1934  MRN: [id number]      Consult received. But patient follows w/ Valentina Boyle MD  - so I have forwarded consult to their covering Dr Keri Jessica, who will see this patient. Thank you for contacting us to participate in this patient's care.      Rossy Ruggiero MD  [facility] [address] [place] [address]

## 2022-10-11 NOTE — PROGRESS NOTES
HOSPITALISTS PROGRESS NOTE    10/11/2022 9:15 AM        Name: Jailyn Canela . Admitted: 10/10/2022  Primary Care Provider: Haile Degroot MD (Tel: [phone])      Brief Course: None this 80-year-old female with PMHx of CHF, hypertension, chronic hyponatremia and SIADH presented after sustaining a fall while going to the bathroom. Patient reported that her leg gave up, and denied hitting her head or loss of consciousness. Patient reported pain in her left shoulder and right hip. Of note patient recently completed a course of oral antibiotic for UTI. X-ray relief communicated fracture of left humeral neck with humeral shaft displaced intrarenal lesion humeral head. Widening of subacromial space suggestive of shoulder joint effusion or hemarthrosis. CT head negative for any acute intracranial pathology. Initial diagnostic lab work showed sodium: 126 (baseline around 130)    Interval history:   Pt seen and examined today   Overnight events noted and interval ancillary notes reviewed. Serum sodium 128 this morning  Pt endorsed       but denied any fevers, chills, chest pain, palpitations, cough, SOB, dizziness, blurry vision, bowel or bladder dysfunction, any focal sensory or motor deficits. Assessment & Plan:     Displaced left proximal humeral fracture  Shoulder xray concerning for shoulder effusion vs hemarthrosis  Orthopedic surgery consulted: Plan for left reverse total shoulder arthroplasty; awaiting or availability  NWB LUE, continue sling. Continue as needed pain medications  Hold anticoagulation for patient for surgery. PT OT    Acute on chronic hyponatremia:  Na 126 on admission; trended up to 128  Nephrology on board: Urine sodium and urine osmole ordered  Restarted back on salt tabs.   Hold diuretics and continue fluid resuscitation  Monitor sodium level closely    Hypertension: Continue home dose of nifedipine XL and monitor BP closely    DVT PPX: SCDs  Code:Full Code    Disposition: Once acute medical issues have resolved    Current Medications  tetanus & diphtheria toxoids (adult) 5-2 LFU injection 0.5 mL, Once  sodium chloride flush 0.9 % injection 5-40 mL, 2 times per day  sodium chloride flush 0.9 % injection 5-40 mL, PRN  0.9 % sodium chloride infusion, PRN  ondansetron (ZOFRAN-ODT) disintegrating tablet 4 mg, Q8H PRN   Or  ondansetron (ZOFRAN) injection 4 mg, Q6H PRN  polyethylene glycol (GLYCOLAX) packet 17 g, Daily PRN  acetaminophen (TYLENOL) tablet 650 mg, Q6H PRN   Or  acetaminophen (TYLENOL) suppository 650 mg, Q6H PRN  morphine (PF) injection 2 mg, Q3H PRN   Or  morphine injection 4 mg, Q3H PRN        Objective:  BP (!) 154/80   Pulse 64   Temp 97.5 °F (36.4 °C) (Oral)   Resp 18   Ht 5' 5\" (1.651 m)   Wt 165 lb (74.8 kg)   SpO2 94%   BMI 27.46 kg/m²     Intake/Output Summary (Last 24 hours) at 10/11/2022 0915  Last data filed at 10/11/2022 5825  Gross per 24 hour   Intake 10 ml   Output 600 ml   Net -590 ml      Wt Readings from Last 3 Encounters:   10/11/22 165 lb (74.8 kg)       Physical Examination:   General appearance:  No apparent distress, appears stated age and cooperative. HEENT: Normocephalic, sclera clear., PERRLA. Trachea midline, no adenopathy. Cardiovascular: Regular rate and rhythm, normal S1, S2. No murmur. Respiratory:Clear to auscultation bilaterally, no wheeze or crackles. GI: Abdomen soft, no tenderness, not distended, normal bowel sounds  Musculoskeletal: No cyanosis in digits. No BLE edema present  Neurology: CN 2-12 grossly intact.  No speech or motor deficits  Psych: Not agitated, appropriate affect  Skin: Warm, dry, normal turgor    Labs and Tests:  CBC:   Recent Labs     10/10/22  1400 10/11/22  0543   WBC 10.0 6.2   HGB 11.9* 10.6*    188     BMP:    Recent Labs     10/10/22  1400 10/10/22  1643 10/11/22  0543   * 127* 128*   K 4.1  --  4.8 CL 91*  --  92*   CO2 25  --  25   BUN 15  --  12   CREATININE <0.5*  --  <0.5*   GLUCOSE 143*  --  95     Hepatic:   Recent Labs     10/10/22  1400   AST 16   ALT 11   BILITOT 0.4   ALKPHOS 69     CT SHOULDER LEFT WO CONTRAST   Final Result   1. Acute and displaced fracture of the proximal left humerus involving the   surgical neck and greater tuberosity with foreshortening of the humerus by   approximately 4-5 cm. Prominent surrounding soft tissue and intramuscular   edema at the fracture site. 2. Osteopenia. XR SHOULDER LEFT (MIN 2 VIEWS)   Final Result      1. Comminuted fracture of the left humeral neck with the humeral shaft   displaced 1 shaft with anteriorly in relation to the humeral head. Humeral   head to glenoid alignment appears to remain intact. 2.  Widening of the subacromial space could suggest shoulder joint effusion   or hemarthrosis. XR HIP 2-3 VW W PELVIS RIGHT   Final Result      Osteoarthritic change of the left hip and scoliosis and degenerative change   of the spine. No evidence of fracture. CT Head W/O Contrast   Final Result      1. No evidence of acute intracranial process. 2.  Findings of presumed small vessel ischemic deep white matter disease. 3.  Chronic sinusitis versus mucous retention cysts within both sphenoid   sinuses. CT CSpine W/O Contrast   Final Result      No evidence of fracture with degenerative changes as described. Problem List  Principal Problem:    Closed displaced fracture of surgical neck of humerus, initial encounter  Resolved Problems:    * No resolved hospital problems.  Jayce Ibarra MD   10/11/2022 9:15 AM

## 2022-10-11 NOTE — PROGRESS NOTES
[facility]  Progress Note      Chief complaint: Left shoulder pain    Subjective: The patient is sitting up in the bed with her daughter at bedside. Pain is mild to moderate. No paresthesias reported. No new issues. Review of Systems:  Constitutional: Negative for fever, chills, fatigue. Skin:  Negative for pruritis, rash  Eyes: Negative for photophobia and visual disturbance. ENT:  Negative for rhinorrhea, epistaxis, sore throat  Respiratory:  Negative for cough and shortness of breath. Cardiovascular: Negative for chest pain. Gastrointestinal: Negative for nausea, vomiting, diarrhea. Genitourinary: Negative for dysuria and difficulty urinating. Neurological: Negative for confusion, dysarthria, tremors, seizures. Psychiatric:  Negative for depression or anxiety  Musculoskeletal:  Positive for left shoulder pain    Objective:  Vitals:    10/11/22 1227   BP: (!) 150/64   Pulse: 68   Resp: 17   Temp: 97.6 °F (36.4 °C)   SpO2: 93%      Physical Examination:  GENERAL: No apparent distress, well-nourished  SKIN:  Warm and dry  EYES: Nonicteric. ENT: Mucous membranes moist  HEAD: Normocephalic, atraumatic  RESPIRATORY: Resp easy and unlabored  CARDIOVASCULAR: Regular rate and rhythm  GI: Abdomen soft, nontender  NEURO: Awake and alert. No speech defect  PSYCHIATRIC: Appropriate affect; not agitated  MUSCULOSKELETAL: Left shoulder  Inspection: On exam there are no ulcerations, rashes or lesions about the left shoulder. There is pain to palpation of the left shoulder. There is ecchymosis noted anteriorly to the left shoulder with obvious swelling. No open areas or abrasions noted. Compartments are all soft and compressible. Motor: Intact left wrist flexion extension and flexion extension of all digits as well as thumb abduction on the left. Shoulder range of motion deferred. Sling in place.   Sensation: Grossly intact to light touch throughout the left upper extremity in all nerve distributions. Vascular: 2+ left radial pulse. Labs reviewed:  Recent Labs     10/10/22  1400 10/11/22  0543   WBC 10.0 6.2   HGB 11.9* 10.6*   HCT 35.9* 32.3*    188     Recent Labs     10/10/22  1400 10/10/22  1643 10/11/22  0543   * 127* 128*   K 4.1  --  4.8   CL 91*  --  92*   CO2 25  --  25   BUN 15  --  12   CREATININE <0.5*  --  <0.5*   GLUCOSE 143*  --  95   CALCIUM 9.7  --  9.2   MG 1.80  --   --      Recent Labs     10/10/22  1643   INR 0.93   PROTIME 12.4       Lab Results   Component Value Date    COLORU Yellow 10/10/2022    CLARITYU Clear 10/10/2022    PHUR 6.0 10/10/2022    GLUCOSEU Negative 10/10/2022    BLOODU Negative 10/10/2022    LEUKOCYTESUR SMALL (A) 10/10/2022    BILIRUBINUR Negative 10/10/2022    UROBILINOGEN 1.0 10/10/2022    RBCUA 1 10/10/2022    WBCUA 2 10/10/2022    BACTERIA None Seen 10/10/2022       Imaging:  CT SHOULDER LEFT WO CONTRAST   Final Result   1. Acute and displaced fracture of the proximal left humerus involving the   surgical neck and greater tuberosity with foreshortening of the humerus by   approximately 4-5 cm. Prominent surrounding soft tissue and intramuscular   edema at the fracture site. 2. Osteopenia. XR SHOULDER LEFT (MIN 2 VIEWS)   Final Result      1. Comminuted fracture of the left humeral neck with the humeral shaft   displaced 1 shaft with anteriorly in relation to the humeral head. Humeral   head to glenoid alignment appears to remain intact. 2.  Widening of the subacromial space could suggest shoulder joint effusion   or hemarthrosis. XR HIP 2-3 VW W PELVIS RIGHT   Final Result      Osteoarthritic change of the left hip and scoliosis and degenerative change   of the spine. No evidence of fracture. CT Head W/O Contrast   Final Result      1. No evidence of acute intracranial process. 2.  Findings of presumed small vessel ischemic deep white matter disease.       3.  Chronic sinusitis versus mucous retention cysts within both sphenoid   sinuses. CT CSpine W/O Contrast   Final Result      No evidence of fracture with degenerative changes as described. IMPRESSION:  Displaced 3-4 part left proximal humerus fracture  HTN  Hyponatremia  Anemia  Principal Problem:    Closed displaced fracture of surgical neck of humerus, initial encounter  Resolved Problems:    * No resolved hospital problems. *    PLAN:    - Planning for left reverse total shoulder arthroplasty with Dr. David Davila. Still trying to coordinate date/time  - NWB LUE; continue sling  - Pain control  - DVT prophylaxis: Hold anti-coags in preparation for surgery.  - OK for PT/OT for now  - Dispo: Expect SNF postoperatively.       ADELINA Bautista - CNP  10/11/2022  12:54 PM

## 2022-10-12 ENCOUNTER — APPOINTMENT (OUTPATIENT)
Dept: GENERAL RADIOLOGY | Age: 87
DRG: 483 | End: 2022-10-12
Payer: MEDICARE

## 2022-10-12 ENCOUNTER — ANESTHESIA EVENT (OUTPATIENT)
Dept: OPERATING ROOM | Age: 87
DRG: 483 | End: 2022-10-12
Payer: MEDICARE

## 2022-10-12 ENCOUNTER — ANESTHESIA (OUTPATIENT)
Dept: OPERATING ROOM | Age: 87
DRG: 483 | End: 2022-10-12
Payer: MEDICARE

## 2022-10-12 PROBLEM — E87.1 HYPONATREMIA: Status: ACTIVE | Noted: 2022-10-12

## 2022-10-12 PROBLEM — M80.00XA OSTEOPOROSIS WITH CURRENT PATHOLOGICAL FRACTURE, INITIAL ENCOUNTER: Status: ACTIVE | Noted: 2022-10-12

## 2022-10-12 LAB
ANION GAP SERPL CALCULATED.3IONS-SCNC: 8 MMOL/L (ref 3–16)
BUN BLDV-MCNC: 8 MG/DL (ref 7–20)
CALCIUM SERPL-MCNC: 9.3 MG/DL (ref 8.3–10.6)
CHLORIDE BLD-SCNC: 92 MMOL/L (ref 99–110)
CO2: 28 MMOL/L (ref 21–32)
CREAT SERPL-MCNC: <0.5 MG/DL (ref 0.6–1.2)
GFR AFRICAN AMERICAN: >60
GFR NON-AFRICAN AMERICAN: >60
GLUCOSE BLD-MCNC: 108 MG/DL (ref 70–99)
GLUCOSE BLD-MCNC: 99 MG/DL (ref 70–99)
HCT VFR BLD CALC: 31.5 % (ref 36–48)
HEMOGLOBIN: 10.4 G/DL (ref 12–16)
MCH RBC QN AUTO: 29.3 PG (ref 26–34)
MCHC RBC AUTO-ENTMCNC: 33 G/DL (ref 31–36)
MCV RBC AUTO: 88.9 FL (ref 80–100)
PDW BLD-RTO: 14.3 % (ref 12.4–15.4)
PERFORMED ON: ABNORMAL
PLATELET # BLD: 173 K/UL (ref 135–450)
PMV BLD AUTO: 6.7 FL (ref 5–10.5)
POTASSIUM SERPL-SCNC: 3.8 MMOL/L (ref 3.5–5.1)
RBC # BLD: 3.55 M/UL (ref 4–5.2)
SODIUM BLD-SCNC: 128 MMOL/L (ref 136–145)
WBC # BLD: 5.1 K/UL (ref 4–11)

## 2022-10-12 PROCEDURE — 7100000000 HC PACU RECOVERY - FIRST 15 MIN: Performed by: ORTHOPAEDIC SURGERY

## 2022-10-12 PROCEDURE — 3700000000 HC ANESTHESIA ATTENDED CARE: Performed by: ORTHOPAEDIC SURGERY

## 2022-10-12 PROCEDURE — 73020 X-RAY EXAM OF SHOULDER: CPT

## 2022-10-12 PROCEDURE — 6360000002 HC RX W HCPCS: Performed by: ANESTHESIOLOGY

## 2022-10-12 PROCEDURE — 23472 RECONSTRUCT SHOULDER JOINT: CPT | Performed by: ORTHOPAEDIC SURGERY

## 2022-10-12 PROCEDURE — 0RRK00Z REPLACEMENT OF LEFT SHOULDER JOINT WITH REVERSE BALL AND SOCKET SYNTHETIC SUBSTITUTE, OPEN APPROACH: ICD-10-PCS | Performed by: ORTHOPAEDIC SURGERY

## 2022-10-12 PROCEDURE — 6360000002 HC RX W HCPCS: Performed by: INTERNAL MEDICINE

## 2022-10-12 PROCEDURE — C1776 JOINT DEVICE (IMPLANTABLE): HCPCS | Performed by: ORTHOPAEDIC SURGERY

## 2022-10-12 PROCEDURE — 23630 OPTX GR HMRL TBRS FX INT FIX: CPT | Performed by: ORTHOPAEDIC SURGERY

## 2022-10-12 PROCEDURE — 2720000010 HC SURG SUPPLY STERILE: Performed by: ORTHOPAEDIC SURGERY

## 2022-10-12 PROCEDURE — 2500000003 HC RX 250 WO HCPCS: Performed by: ORTHOPAEDIC SURGERY

## 2022-10-12 PROCEDURE — 3700000001 HC ADD 15 MINUTES (ANESTHESIA): Performed by: ORTHOPAEDIC SURGERY

## 2022-10-12 PROCEDURE — A4217 STERILE WATER/SALINE, 500 ML: HCPCS | Performed by: ORTHOPAEDIC SURGERY

## 2022-10-12 PROCEDURE — 3600000015 HC SURGERY LEVEL 5 ADDTL 15MIN: Performed by: ORTHOPAEDIC SURGERY

## 2022-10-12 PROCEDURE — 2580000003 HC RX 258: Performed by: NURSE PRACTITIONER

## 2022-10-12 PROCEDURE — 1200000000 HC SEMI PRIVATE

## 2022-10-12 PROCEDURE — 6360000002 HC RX W HCPCS: Performed by: ORTHOPAEDIC SURGERY

## 2022-10-12 PROCEDURE — 7100000001 HC PACU RECOVERY - ADDTL 15 MIN: Performed by: ORTHOPAEDIC SURGERY

## 2022-10-12 PROCEDURE — 2580000003 HC RX 258: Performed by: NURSE ANESTHETIST, CERTIFIED REGISTERED

## 2022-10-12 PROCEDURE — 2580000003 HC RX 258: Performed by: ORTHOPAEDIC SURGERY

## 2022-10-12 PROCEDURE — 64415 NJX AA&/STRD BRCH PLXS IMG: CPT | Performed by: ANESTHESIOLOGY

## 2022-10-12 PROCEDURE — 94760 N-INVAS EAR/PLS OXIMETRY 1: CPT

## 2022-10-12 PROCEDURE — 36415 COLL VENOUS BLD VENIPUNCTURE: CPT

## 2022-10-12 PROCEDURE — 2500000003 HC RX 250 WO HCPCS: Performed by: NURSE ANESTHETIST, CERTIFIED REGISTERED

## 2022-10-12 PROCEDURE — C1713 ANCHOR/SCREW BN/BN,TIS/BN: HCPCS | Performed by: ORTHOPAEDIC SURGERY

## 2022-10-12 PROCEDURE — 6360000002 HC RX W HCPCS: Performed by: FAMILY MEDICINE

## 2022-10-12 PROCEDURE — 6370000000 HC RX 637 (ALT 250 FOR IP): Performed by: INTERNAL MEDICINE

## 2022-10-12 PROCEDURE — 6360000002 HC RX W HCPCS: Performed by: NURSE ANESTHETIST, CERTIFIED REGISTERED

## 2022-10-12 PROCEDURE — 85027 COMPLETE CBC AUTOMATED: CPT

## 2022-10-12 PROCEDURE — APPNB45 APP NON BILLABLE 31-45 MINUTES: Performed by: NURSE PRACTITIONER

## 2022-10-12 PROCEDURE — 2709999900 HC NON-CHARGEABLE SUPPLY: Performed by: ORTHOPAEDIC SURGERY

## 2022-10-12 PROCEDURE — 99232 SBSQ HOSP IP/OBS MODERATE 35: CPT | Performed by: ORTHOPAEDIC SURGERY

## 2022-10-12 PROCEDURE — 3600000005 HC SURGERY LEVEL 5 BASE: Performed by: ORTHOPAEDIC SURGERY

## 2022-10-12 PROCEDURE — 80048 BASIC METABOLIC PNL TOTAL CA: CPT

## 2022-10-12 DEVICE — IMPLANTABLE DEVICE
Type: IMPLANTABLE DEVICE | Site: SHOULDER | Status: FUNCTIONAL
Brand: BIOMET® BONE CEMENT R

## 2022-10-12 DEVICE — SCREW BONE L35MM DIA6.5MM TI ST FULL THRD CTRL FOR GLEN: Type: IMPLANTABLE DEVICE | Site: SHOULDER | Status: FUNCTIONAL

## 2022-10-12 DEVICE — BASEPLATE GLEN DIA25MM STD REVERSED AEQUALIS PERFORM: Type: IMPLANTABLE DEVICE | Site: SHOULDER | Status: FUNCTIONAL

## 2022-10-12 DEVICE — SPHERE GLEN DIA36MM STD REVERSED AEQUALIS PERFORM: Type: IMPLANTABLE DEVICE | Site: SHOULDER | Status: FUNCTIONAL

## 2022-10-12 DEVICE — SCREW BONE L30MM DIA5MM TI ST FULL THRD PERIPH FOR GLEN: Type: IMPLANTABLE DEVICE | Site: SHOULDER | Status: FUNCTIONAL

## 2022-10-12 DEVICE — SCREW BNE PERIPH 5X38 MM SHLDR REVERSED NS AEQUALIS PERFORM: Type: IMPLANTABLE DEVICE | Site: SHOULDER | Status: FUNCTIONAL

## 2022-10-12 DEVICE — CEMENT RESTRICTOR
Type: IMPLANTABLE DEVICE | Site: SHOULDER | Status: FUNCTIONAL
Brand: CEMENT RESTRICTOR

## 2022-10-12 DEVICE — IMPLANTABLE DEVICE
Type: IMPLANTABLE DEVICE | Site: SHOULDER | Status: FUNCTIONAL
Brand: TORNIER FLEX SHOULDER SYSTEM

## 2022-10-12 DEVICE — IMPLANTABLE DEVICE
Type: IMPLANTABLE DEVICE | Site: SHOULDER | Status: FUNCTIONAL
Brand: FLEX SHOULDER SYSTEM

## 2022-10-12 RX ORDER — SODIUM CHLORIDE 9 MG/ML
INJECTION, SOLUTION INTRAVENOUS PRN
Status: DISCONTINUED | OUTPATIENT
Start: 2022-10-12 | End: 2022-10-13

## 2022-10-12 RX ORDER — MAGNESIUM SULFATE HEPTAHYDRATE 500 MG/ML
INJECTION, SOLUTION INTRAMUSCULAR; INTRAVENOUS PRN
Status: DISCONTINUED | OUTPATIENT
Start: 2022-10-12 | End: 2022-10-12 | Stop reason: SDUPTHER

## 2022-10-12 RX ORDER — SODIUM CHLORIDE 0.9 % (FLUSH) 0.9 %
5-40 SYRINGE (ML) INJECTION PRN
Status: DISCONTINUED | OUTPATIENT
Start: 2022-10-12 | End: 2022-10-13

## 2022-10-12 RX ORDER — SODIUM CHLORIDE 9 MG/ML
INJECTION, SOLUTION INTRAVENOUS CONTINUOUS PRN
Status: DISCONTINUED | OUTPATIENT
Start: 2022-10-12 | End: 2022-10-12 | Stop reason: SDUPTHER

## 2022-10-12 RX ORDER — DEXAMETHASONE SODIUM PHOSPHATE 4 MG/ML
INJECTION, SOLUTION INTRA-ARTICULAR; INTRALESIONAL; INTRAMUSCULAR; INTRAVENOUS; SOFT TISSUE PRN
Status: DISCONTINUED | OUTPATIENT
Start: 2022-10-12 | End: 2022-10-12 | Stop reason: SDUPTHER

## 2022-10-12 RX ORDER — CEFAZOLIN 2 G/1
INJECTION, POWDER, FOR SOLUTION INTRAMUSCULAR; INTRAVENOUS
Status: COMPLETED
Start: 2022-10-12 | End: 2022-10-12

## 2022-10-12 RX ORDER — MAGNESIUM HYDROXIDE 1200 MG/15ML
LIQUID ORAL CONTINUOUS PRN
Status: COMPLETED | OUTPATIENT
Start: 2022-10-12 | End: 2022-10-12

## 2022-10-12 RX ORDER — SODIUM CHLORIDE 1000 MG
1 TABLET, SOLUBLE MISCELLANEOUS
Status: DISCONTINUED | OUTPATIENT
Start: 2022-10-13 | End: 2022-10-12

## 2022-10-12 RX ORDER — ROCURONIUM BROMIDE 10 MG/ML
INJECTION, SOLUTION INTRAVENOUS PRN
Status: DISCONTINUED | OUTPATIENT
Start: 2022-10-12 | End: 2022-10-12 | Stop reason: SDUPTHER

## 2022-10-12 RX ORDER — SODIUM CHLORIDE 0.9 % (FLUSH) 0.9 %
5-40 SYRINGE (ML) INJECTION EVERY 12 HOURS SCHEDULED
Status: DISCONTINUED | OUTPATIENT
Start: 2022-10-12 | End: 2022-10-13

## 2022-10-12 RX ORDER — MEPERIDINE HYDROCHLORIDE 25 MG/ML
12.5 INJECTION INTRAMUSCULAR; INTRAVENOUS; SUBCUTANEOUS EVERY 5 MIN PRN
Status: DISCONTINUED | OUTPATIENT
Start: 2022-10-12 | End: 2022-10-13

## 2022-10-12 RX ORDER — SODIUM CHLORIDE 1000 MG
2 TABLET, SOLUBLE MISCELLANEOUS 2 TIMES DAILY WITH MEALS
Status: DISPENSED | OUTPATIENT
Start: 2022-10-12 | End: 2022-10-13

## 2022-10-12 RX ORDER — GLYCOPYRROLATE 0.2 MG/ML
INJECTION INTRAMUSCULAR; INTRAVENOUS PRN
Status: DISCONTINUED | OUTPATIENT
Start: 2022-10-12 | End: 2022-10-12 | Stop reason: SDUPTHER

## 2022-10-12 RX ORDER — ONDANSETRON 2 MG/ML
4 INJECTION INTRAMUSCULAR; INTRAVENOUS
Status: COMPLETED | OUTPATIENT
Start: 2022-10-12 | End: 2022-10-12

## 2022-10-12 RX ORDER — FENTANYL CITRATE 50 UG/ML
INJECTION, SOLUTION INTRAMUSCULAR; INTRAVENOUS PRN
Status: DISCONTINUED | OUTPATIENT
Start: 2022-10-12 | End: 2022-10-12 | Stop reason: SDUPTHER

## 2022-10-12 RX ORDER — SUCCINYLCHOLINE/SOD CL,ISO/PF 200MG/10ML
SYRINGE (ML) INTRAVENOUS PRN
Status: DISCONTINUED | OUTPATIENT
Start: 2022-10-12 | End: 2022-10-12 | Stop reason: SDUPTHER

## 2022-10-12 RX ORDER — MIDAZOLAM HYDROCHLORIDE 2 MG/2ML
2 INJECTION, SOLUTION INTRAMUSCULAR; INTRAVENOUS ONCE
Status: COMPLETED | OUTPATIENT
Start: 2022-10-12 | End: 2022-10-12

## 2022-10-12 RX ORDER — VANCOMYCIN HYDROCHLORIDE 1 G/20ML
INJECTION, POWDER, LYOPHILIZED, FOR SOLUTION INTRAVENOUS
Status: COMPLETED | OUTPATIENT
Start: 2022-10-12 | End: 2022-10-12

## 2022-10-12 RX ORDER — CEFAZOLIN SODIUM 1 G/3ML
INJECTION, POWDER, FOR SOLUTION INTRAMUSCULAR; INTRAVENOUS PRN
Status: DISCONTINUED | OUTPATIENT
Start: 2022-10-12 | End: 2022-10-12 | Stop reason: SDUPTHER

## 2022-10-12 RX ORDER — ONDANSETRON 2 MG/ML
INJECTION INTRAMUSCULAR; INTRAVENOUS PRN
Status: DISCONTINUED | OUTPATIENT
Start: 2022-10-12 | End: 2022-10-12 | Stop reason: SDUPTHER

## 2022-10-12 RX ORDER — MAGNESIUM HYDROXIDE 1200 MG/15ML
LIQUID ORAL
Status: COMPLETED | OUTPATIENT
Start: 2022-10-12 | End: 2022-10-12

## 2022-10-12 RX ORDER — PROPOFOL 10 MG/ML
INJECTION, EMULSION INTRAVENOUS PRN
Status: DISCONTINUED | OUTPATIENT
Start: 2022-10-12 | End: 2022-10-12 | Stop reason: SDUPTHER

## 2022-10-12 RX ORDER — FENTANYL CITRATE 50 UG/ML
25 INJECTION, SOLUTION INTRAMUSCULAR; INTRAVENOUS EVERY 5 MIN PRN
Status: DISCONTINUED | OUTPATIENT
Start: 2022-10-12 | End: 2022-10-13

## 2022-10-12 RX ADMIN — ONDANSETRON 4 MG: 2 INJECTION INTRAMUSCULAR; INTRAVENOUS at 09:23

## 2022-10-12 RX ADMIN — SODIUM CHLORIDE: 9 INJECTION, SOLUTION INTRAVENOUS at 16:29

## 2022-10-12 RX ADMIN — DEXAMETHASONE SODIUM PHOSPHATE 8 MG: 4 INJECTION, SOLUTION INTRAMUSCULAR; INTRAVENOUS at 16:58

## 2022-10-12 RX ADMIN — PHENYLEPHRINE HYDROCHLORIDE 100 MCG: 10 INJECTION INTRAVENOUS at 17:11

## 2022-10-12 RX ADMIN — FENTANYL CITRATE 25 MCG: 50 INJECTION, SOLUTION INTRAMUSCULAR; INTRAVENOUS at 18:15

## 2022-10-12 RX ADMIN — SUGAMMADEX 200 MG: 100 INJECTION, SOLUTION INTRAVENOUS at 18:51

## 2022-10-12 RX ADMIN — Medication 80 MG: at 16:37

## 2022-10-12 RX ADMIN — CEFAZOLIN 2 G: 1 INJECTION, POWDER, FOR SOLUTION INTRAVENOUS at 16:32

## 2022-10-12 RX ADMIN — PHENYLEPHRINE HYDROCHLORIDE 40 MCG/MIN: 10 INJECTION INTRAVENOUS at 17:43

## 2022-10-12 RX ADMIN — ONDANSETRON 4 MG: 2 INJECTION INTRAMUSCULAR; INTRAVENOUS at 19:24

## 2022-10-12 RX ADMIN — MAGNESIUM SULFATE HEPTAHYDRATE 1 G: 500 INJECTION, SOLUTION INTRAMUSCULAR; INTRAVENOUS at 16:47

## 2022-10-12 RX ADMIN — SODIUM CHLORIDE TAB 1 GM 2 G: 1 TAB at 08:39

## 2022-10-12 RX ADMIN — MORPHINE SULFATE 2 MG: 2 INJECTION, SOLUTION INTRAMUSCULAR; INTRAVENOUS at 09:18

## 2022-10-12 RX ADMIN — ONDANSETRON 4 MG: 2 INJECTION INTRAMUSCULAR; INTRAVENOUS at 18:18

## 2022-10-12 RX ADMIN — ROCURONIUM BROMIDE 20 MG: 10 INJECTION, SOLUTION INTRAVENOUS at 17:42

## 2022-10-12 RX ADMIN — GLYCOPYRROLATE 0.2 MG: 1 INJECTION INTRAMUSCULAR; INTRAVENOUS at 16:55

## 2022-10-12 RX ADMIN — PHENYLEPHRINE HYDROCHLORIDE 100 MCG: 10 INJECTION INTRAVENOUS at 16:50

## 2022-10-12 RX ADMIN — FENTANYL CITRATE 50 MCG: 50 INJECTION, SOLUTION INTRAMUSCULAR; INTRAVENOUS at 18:45

## 2022-10-12 RX ADMIN — FENTANYL CITRATE 25 MCG: 50 INJECTION, SOLUTION INTRAMUSCULAR; INTRAVENOUS at 17:18

## 2022-10-12 RX ADMIN — PHENYLEPHRINE HYDROCHLORIDE 100 MCG: 10 INJECTION INTRAVENOUS at 17:03

## 2022-10-12 RX ADMIN — PHENYLEPHRINE HYDROCHLORIDE 100 MCG: 10 INJECTION INTRAVENOUS at 16:48

## 2022-10-12 RX ADMIN — PHENYLEPHRINE HYDROCHLORIDE 100 MCG: 10 INJECTION INTRAVENOUS at 16:52

## 2022-10-12 RX ADMIN — MIDAZOLAM 2 MG: 1 INJECTION INTRAMUSCULAR; INTRAVENOUS at 15:16

## 2022-10-12 RX ADMIN — TRANEXAMIC ACID 1000 MG: 1 INJECTION, SOLUTION INTRAVENOUS at 16:41

## 2022-10-12 RX ADMIN — SODIUM CHLORIDE: 9 INJECTION, SOLUTION INTRAVENOUS at 18:46

## 2022-10-12 RX ADMIN — ROCURONIUM BROMIDE 30 MG: 10 INJECTION, SOLUTION INTRAVENOUS at 16:55

## 2022-10-12 RX ADMIN — TRANEXAMIC ACID 1000 MG: 1 INJECTION, SOLUTION INTRAVENOUS at 18:38

## 2022-10-12 RX ADMIN — Medication 10 ML: at 21:15

## 2022-10-12 RX ADMIN — PROPOFOL 70 MG: 10 INJECTION, EMULSION INTRAVENOUS at 16:36

## 2022-10-12 ASSESSMENT — PAIN SCALES - PAIN ASSESSMENT IN ADVANCED DEMENTIA (PAINAD)
CONSOLABILITY: 0
BREATHING: 0
CONSOLABILITY: 0
BODYLANGUAGE: 0
CONSOLABILITY: 0
BREATHING: 0
CONSOLABILITY: 0
NEGVOCALIZATION: 0
BREATHING: 0
BODYLANGUAGE: 0
TOTALSCORE: 0
NEGVOCALIZATION: 0
TOTALSCORE: 0
BREATHING: 0
BODYLANGUAGE: 0
TOTALSCORE: 0
FACIALEXPRESSION: 0
FACIALEXPRESSION: 0
BREATHING: 0
NEGVOCALIZATION: 0
BODYLANGUAGE: 0
FACIALEXPRESSION: 0
TOTALSCORE: 0
FACIALEXPRESSION: 0
TOTALSCORE: 0
CONSOLABILITY: 0
FACIALEXPRESSION: 0
BODYLANGUAGE: 0
NEGVOCALIZATION: 0
NEGVOCALIZATION: 0

## 2022-10-12 ASSESSMENT — PAIN DESCRIPTION - DESCRIPTORS
DESCRIPTORS: STABBING
DESCRIPTORS: ACHING
DESCRIPTORS: ACHING

## 2022-10-12 ASSESSMENT — PAIN DESCRIPTION - ORIENTATION
ORIENTATION: LEFT

## 2022-10-12 ASSESSMENT — PAIN SCALES - GENERAL
PAINLEVEL_OUTOF10: 7
PAINLEVEL_OUTOF10: 3
PAINLEVEL_OUTOF10: 5

## 2022-10-12 ASSESSMENT — PAIN DESCRIPTION - LOCATION
LOCATION: SHOULDER

## 2022-10-12 ASSESSMENT — PAIN - FUNCTIONAL ASSESSMENT: PAIN_FUNCTIONAL_ASSESSMENT: 0-10

## 2022-10-12 NOTE — CARE COORDINATION
Discharge Planning Assessment  Risk of Readmission Score: 12%    RN discharge planner met with patient to discuss reason for admission, current living situation, and potential needs at the time of discharge. Demographics/Insurance verified Yes    Current type of dwelling: Two story home home with two steps to enter. The patient has a stair lift to the second floor where her bedroom and full bath is located. Patient from [facility]/ confirmed with: N/A    Living arrangements: Daughter, Carolina Rivera    Level of function/Support: The patient uses a walker to ambulate. The patient needs some assistance with ADL's which is being provided by her daughter. Note: Patient reports wounds to both knees, left arm    PCP: Jose Marin MD    Last Visit to PCP: two weeks ago    DME: walker, walk-in shower, shower bench, grab bar    Active with any community resources/agencies/skilled home care:    - Stay Well    - Patient has been at [facility], [facility]    - Patient has been and will not return to [facility]    Medication compliance issues: The daughter oversees the patient's medications.     Financial issues that could impact healthcare: None    Tentative discharge plan: Surgery today at 1600, PT/OT after surgery, ARU consult placed, discharge plan TBD      Transportation at the time of discharge: LEATHA Alegria RN    [address] [facility]  Phone: [phone]

## 2022-10-12 NOTE — PROGRESS NOTES
left.  Shoulder range of motion deferred. Sling in place. Sensation: Grossly intact to light touch throughout the left upper extremity in all nerve distributions. Vascular: 2+ left radial pulse. Labs reviewed:  Recent Labs     10/10/22  1400 10/11/22  0543 10/12/22  0428   WBC 10.0 6.2 5.1   HGB 11.9* 10.6* 10.4*   HCT 35.9* 32.3* 31.5*    188 173     Recent Labs     10/10/22  1400 10/10/22  1643 10/11/22  0543 10/11/22  1612 10/12/22  0428   *   < > 128* 125* 128*   K 4.1  --  4.8 4.1 3.8   CL 91*  --  92* 90* 92*   CO2 25  --  25 27 28   BUN 15  --  12 10 8   CREATININE <0.5*  --  <0.5* <0.5* <0.5*   GLUCOSE 143*  --  95 102* 99   CALCIUM 9.7  --  9.2 9.4 9.3   MG 1.80  --   --   --   --     < > = values in this interval not displayed. Recent Labs     10/10/22  1643   INR 0.93   PROTIME 12.4       Lab Results   Component Value Date    COLORU Yellow 10/10/2022    CLARITYU Clear 10/10/2022    PHUR 6.0 10/10/2022    GLUCOSEU Negative 10/10/2022    BLOODU Negative 10/10/2022    LEUKOCYTESUR SMALL (A) 10/10/2022    BILIRUBINUR Negative 10/10/2022    UROBILINOGEN 1.0 10/10/2022    RBCUA 1 10/10/2022    WBCUA 2 10/10/2022    BACTERIA None Seen 10/10/2022       Imaging:  CT SHOULDER LEFT WO CONTRAST   Final Result   1. Acute and displaced fracture of the proximal left humerus involving the   surgical neck and greater tuberosity with foreshortening of the humerus by   approximately 4-5 cm. Prominent surrounding soft tissue and intramuscular   edema at the fracture site. 2. Osteopenia. XR SHOULDER LEFT (MIN 2 VIEWS)   Final Result      1. Comminuted fracture of the left humeral neck with the humeral shaft   displaced 1 shaft with anteriorly in relation to the humeral head. Humeral   head to glenoid alignment appears to remain intact. 2.  Widening of the subacromial space could suggest shoulder joint effusion   or hemarthrosis.          XR HIP 2-3 VW W PELVIS RIGHT   Final Result Osteoarthritic change of the left hip and scoliosis and degenerative change   of the spine. No evidence of fracture. CT Head W/O Contrast   Final Result      1. No evidence of acute intracranial process. 2.  Findings of presumed small vessel ischemic deep white matter disease. 3.  Chronic sinusitis versus mucous retention cysts within both sphenoid   sinuses. CT CSpine W/O Contrast   Final Result      No evidence of fracture with degenerative changes as described. IMPRESSION:  Displaced 3-4 part left proximal humerus fracture  HTN  Hyponatremia  Anemia  Principal Problem:    Closed displaced fracture of surgical neck of humerus, initial encounter  Resolved Problems:    * No resolved hospital problems. *    PLAN:  Started on sodium tablets per nephrology. - Planning for left reverse total shoulder arthroplasty with Dr. Corin Card today around 4 PM.  - NWB LUE; continue sling  - Pain control  - DVT prophylaxis: Hold anti-coags in preparation for surgery.  - OK for PT/OT for now  - Dispo: Expect SNF postoperatively.       ADELINA Pittman - TAYLOR  10/12/2022  11:51 AM

## 2022-10-12 NOTE — ANESTHESIA PRE PROCEDURE
Department of Anesthesiology  Preprocedure Note       Name:  Jocelyne Ac   Age:  80 y.o.  :  1934                                          MRN:  [id number]         Date:  10/12/2022      Surgeon: Haily Mcgill):  Vernice Dakins, MD    Procedure: Procedure(s):  LEFT REVERSE TOTAL SHOULDER ARTHROPLASTY WITH TUBEROSITY FIXATION - Fe Felix; REGIONAL BLOCK    Medications prior to admission:   Prior to Admission medications    Medication Sig Start Date End Date Taking? Authorizing Provider   loratadine (CLARITIN) 10 MG tablet Take 10 mg by mouth daily 20  Yes Historical Provider, MD   magnesium oxide (MAG-OX) 400 MG tablet Take 400 mg by mouth 2 times daily Take it with meal   Yes Historical Provider, MD   furosemide (LASIX) 20 MG tablet Take 20 mg by mouth daily as needed As needed for swelling   Yes Historical Provider, MD   docusate sodium (COLACE) 100 MG capsule Take 100 mg by mouth 2 times daily   Yes Historical Provider, MD   aspirin 81 MG EC tablet Take 81 mg by mouth daily   Yes Historical Provider, MD   b complex vitamins capsule Take 1 capsule by mouth daily   Yes Historical Provider, MD   potassium chloride (MICRO-K) 10 MEQ extended release capsule Take 10 mEq by mouth daily   Yes Historical Provider, MD   NIFEdipine (ADALAT CC) 30 MG extended release tablet Take 30 mg by mouth daily   Yes Historical Provider, MD   lidocaine (LIDODERM) 5 % Place 1 patch onto the skin daily 12 hours on, 12 hours off.    Yes Historical Provider, MD       Current medications:    Current Facility-Administered Medications   Medication Dose Route Frequency Provider Last Rate Last Admin    [START ON 10/13/2022] sodium chloride tablet 1 g  1 g Oral TID  Luz Kim MD        sodium chloride tablet 2 g  2 g Oral BID  Luz Kim MD   2 g at 10/12/22 0839    NIFEdipine (ADALAT CC) extended release tablet 30 mg  30 mg Oral Daily Bethanie Cantor MD   30 mg at 10/11/22 1352    cetirizine (ZYRTEC) tablet 5 mg  5 mg Oral Daily Bang Salinas MD   5 mg at 10/11/22 1205    acetaminophen (TYLENOL) tablet 1,000 mg  1,000 mg Oral TID Jessy Alonzo, APRN - CNP   1,000 mg at 10/11/22 1352    oxyCODONE (ROXICODONE) immediate release tablet 5 mg  5 mg Oral Q4H PRN Jessy Alonzo, APRN - CNP   5 mg at 10/11/22 1521    morphine (PF) injection 2 mg  2 mg IntraVENous Q4H PRN Bang Salinas MD   2 mg at 10/12/22 0724    Or    morphine injection 4 mg  4 mg IntraVENous Q4H PRN Bang Salinas MD        tetanus & diphtheria toxoids (adult) 5-2 LFU injection 0.5 mL  0.5 mL IntraMUSCular Once Achilles MD Gui        sodium chloride flush 0.9 % injection 5-40 mL  5-40 mL IntraVENous 2 times per day Yevgeniy Peralta MD   10 mL at 10/11/22 2135    sodium chloride flush 0.9 % injection 5-40 mL  5-40 mL IntraVENous PRN Yevgeniy Peralta MD        0.9 % sodium chloride infusion   IntraVENous PRN Yevgeniy Peralta MD        ondansetron (ZOFRAN-ODT) disintegrating tablet 4 mg  4 mg Oral Q8H PRN Yevgeniy Peralta MD        Or    ondansetron [facility]) injection 4 mg  4 mg IntraVENous Q6H PRN Yevgeniy Peralta MD   4 mg at 10/12/22 3989    polyethylene glycol (GLYCOLAX) packet 17 g  17 g Oral Daily PRN Yevgeniy Peralta MD        acetaminophen (TYLENOL) tablet 650 mg  650 mg Oral Q6H PRN Yevgeniy Peralta MD        Or   Lakeisha Cuong acetaminophen (TYLENOL) suppository 650 mg  650 mg Rectal Q6H PRN Yevgeniy Peralta MD           Allergies:     Allergies   Allergen Reactions    Azithromycin Hives and Other (See Comments)    Hydrocodone-Acetaminophen Diarrhea, Nausea Only, Other (See Comments) and Nausea And Vomiting    Strawberry (Diagnostic) Hives    Adhesive Tape Other (See Comments)    Strawberry Other (See Comments)    Tramadol Nausea Only and Other (See Comments)     \"Turned patient into a zombie\"  Turned pt into a zombie         Problem List:    Patient Active Problem List   Diagnosis Code    Closed displaced fracture of surgical neck of humerus, initial encounter Q14.614O       Past Medical History:        Diagnosis Date    CHF (congestive heart failure) (HCC)     Hypertension        Past Surgical History:        Procedure Laterality Date     SECTION      CHOLECYSTECTOMY         Social History:    Social History     Tobacco Use    Smoking status: Never    Smokeless tobacco: Never   Substance Use Topics    Alcohol use: Not Currently                                Counseling given: Not Answered      Vital Signs (Current):   Vitals:    10/12/22 0732 10/12/22 0800 10/12/22 1215 10/12/22 1312   BP: (!) 156/69 134/83 (!) 153/67    Pulse: 74 74 77 77   Resp: 16 16 16 16   Temp: 97.6 °F (36.4 °C) 98.3 °F (36.8 °C) 98.3 °F (36.8 °C)    TempSrc: Oral Oral Oral    SpO2: 92% 93% 93% 94%   Weight:       Height:                                                  BP Readings from Last 3 Encounters:   10/12/22 (!) 153/67       NPO Status:                                                                                 BMI:   Wt Readings from Last 3 Encounters:   10/11/22 165 lb (74.8 kg)     Body mass index is 27.46 kg/m².     CBC:   Lab Results   Component Value Date/Time    WBC 5.1 10/12/2022 04:28 AM    RBC 3.55 10/12/2022 04:28 AM    HGB 10.4 10/12/2022 04:28 AM    HCT 31.5 10/12/2022 04:28 AM    MCV 88.9 10/12/2022 04:28 AM    RDW 14.3 10/12/2022 04:28 AM     10/12/2022 04:28 AM       CMP:   Lab Results   Component Value Date/Time     10/12/2022 04:28 AM    K 3.8 10/12/2022 04:28 AM    K 4.1 10/10/2022 02:00 PM    CL 92 10/12/2022 04:28 AM    CO2 28 10/12/2022 04:28 AM    BUN 8 10/12/2022 04:28 AM    CREATININE <0.5 10/12/2022 04:28 AM    GFRAA >60 10/12/2022 04:28 AM    AGRATIO 1.2 10/10/2022 02:00 PM    LABGLOM >60 10/12/2022 04:28 AM    GLUCOSE 99 10/12/2022 04:28 AM    PROT 6.9 10/10/2022 02:00 PM    CALCIUM 9.3 10/12/2022 04:28 AM    BILITOT 0.4 10/10/2022 02:00 PM    ALKPHOS 69 10/10/2022 02:00 PM    AST 16 10/10/2022 02:00 PM    ALT 11 10/10/2022 02:00 PM POC Tests: No results for input(s): POCGLU, POCNA, POCK, POCCL, POCBUN, POCHEMO, POCHCT in the last 72 hours. Coags:   Lab Results   Component Value Date/Time    PROTIME 12.4 10/10/2022 04:43 PM    INR 0.93 10/10/2022 04:43 PM       HCG (If Applicable): No results found for: PREGTESTUR, PREGSERUM, HCG, HCGQUANT     ABGs: No results found for: PHART, PO2ART, ZGH2IUM, OIB9LTR, BEART, L5UFPMXU     Type & Screen (If Applicable):  No results found for: LABABO, LABRH    Drug/Infectious Status (If Applicable):  No results found for: HIV, HEPCAB    COVID-19 Screening (If Applicable): No results found for: COVID19        Anesthesia Evaluation  Patient summary reviewed and Nursing notes reviewed  Airway: Mallampati: II          Dental:          Pulmonary:                              Cardiovascular:  Exercise tolerance: poor (<4 METS),   (+) hypertension:, CHF:,                   Neuro/Psych:               GI/Hepatic/Renal:             Endo/Other:                     Abdominal:             Vascular:           Other Findings:           Anesthesia Plan      general and regional     ASA 3                                   Eneida Knapp MD   10/12/2022

## 2022-10-12 NOTE — DISCHARGE INSTRUCTIONS
Follow up with your PCP within 7-10 days of discharge. Follow up with nephrology as instructed   Follow up with orthopedic surgery as instructed   Take all your medications as prescribed. SHOULDER REPAIR POSTOPERATIVE INSTRUCTIONS      ACTIVITIES:  Keep your arm in the sling and external rotation brace after surgery. You should rotate your forearm and move your wrist/hand/fingers freely. You should loosen the sling two to three times a day to fully extend and flex your elbow to prevent stiffness in the elbow. No shoulder range of motion for now. Full time sling use for 6-8 weeks after surgery. DRESSING:  Keep the dressing clean/dry/intact for 10 days after surgery. Replace as needed for soiling/saturation. After 10 days, you can remove the outer dressing and if the incision site is dry, you can leave it open to air. ICE/COOLING:  Apply ice to your shoulder if you did not purchase an ice cuff. Make sure to use a leak proof bag so the dressing does not get wet from the ice. Make sure the ice or cooling cuff is not directly in contact with your skin. Prolonged contact can result in frostbite and injury to the skin. Alternate for 15 minutes on and 15 minutes off. If you did purchase a cooling cuff, follow the instructions included with the cuff to keep it cold. DRIVING:  No driving for the foreseeable future. THERAPY:  Hold off on physical therapy until I see you back for your postoperative appointments. I will let you know at the appointments when you should start formal therapy. MEDICATIONS:  Although you have a prescription for a strong narcotic pain medication, many patients are able to handle the discomfort postoperatively with a milder medication such as Extra-Strength Tylenol. Take the narcotic medication as needed only, and wean off this medication as soon as possible.   Try to avoid anti-inflammatories (NSAIDs include ibuprofen, naproxen, diclofenac, meloxicam, etc.) for 2 weeks after surgery as a repair was performed. BLOOD CLOT PREVENTION:  Use compression stockings during the day. Perform ankle pumps every hour while awake. Mobilize/walk frequently throughout the day. Take Aspirin 81mg by mouth twice daily for 2 weeks after surgery. SMOKING:  As discussed preoperatively for those who use tobacco products, refrain from using all tobacco products as it has been shown to decrease the healing rate/increase retear rate, patients have more pain, and have worse functional outcomes. FOLLOW-UP:  You should already have your first postoperative visit scheduled at the time your surgery was scheduled. If you do not, please call the office at [phone] to make the appointment. At the first office visit after surgery, we will perform a wound check, remove your stitches, and go over the pictures from your surgery.

## 2022-10-12 NOTE — PROGRESS NOTES
Pt arrived to PACU from OR, VSS, pt arouses to voice. L shoulder incision is CDI, ice applied; extremity in sling. L radial pulse is palpable. Will continue to monitor.

## 2022-10-12 NOTE — H&P
PLANNED PROCEDURE:  left reverse shoulder arthroplasty with tuberosity fixation    I have reviewed the hospitalist notes, orthopedic notes, patient's imaging, examined the patient, and no pertinent changes are required. RHD  Ground level fall  Uses walker for ambulation  Denies left hand N/T    Past Medical History:   Diagnosis Date    CHF (congestive heart failure) (HCC)     Hypertension        Past Surgical History:   Procedure Laterality Date     SECTION      CHOLECYSTECTOMY         Social History     Tobacco Use    Smoking status: Never    Smokeless tobacco: Never   Substance Use Topics    Alcohol use: Not Currently    Drug use: Never       Allergies   Allergen Reactions    Azithromycin Hives and Other (See Comments)    Hydrocodone-Acetaminophen Diarrhea, Nausea Only, Other (See Comments) and Nausea And Vomiting    Strawberry (Diagnostic) Hives    Adhesive Tape Other (See Comments)    Strawberry Other (See Comments)    Tramadol Nausea Only and Other (See Comments)     \"Turned patient into a zombie\"  Turned pt into a zombie         Medications Prior to Admission: loratadine (CLARITIN) 10 MG tablet, Take 10 mg by mouth daily  magnesium oxide (MAG-OX) 400 MG tablet, Take 400 mg by mouth 2 times daily Take it with meal  furosemide (LASIX) 20 MG tablet, Take 20 mg by mouth daily as needed As needed for swelling  docusate sodium (COLACE) 100 MG capsule, Take 100 mg by mouth 2 times daily  aspirin 81 MG EC tablet, Take 81 mg by mouth daily  b complex vitamins capsule, Take 1 capsule by mouth daily  potassium chloride (MICRO-K) 10 MEQ extended release capsule, Take 10 mEq by mouth daily  NIFEdipine (ADALAT CC) 30 MG extended release tablet, Take 30 mg by mouth daily  lidocaine (LIDODERM) 5 %, Place 1 patch onto the skin daily 12 hours on, 12 hours off.       Current Facility-Administered Medications:     sodium chloride tablet 2 g, 2 g, Oral, BID WC, Nathaniel Henry MD, 2 g at 10/12/22 0843    tranexamic acid (CYKLOKAPRON) 1,000 mg in sodium chloride 0.9 % 60 mL IVPB, 1,000 mg, IntraVENous, On Call to OR, Marya Anne MD    tranexamic acid (CYKLOKAPRON) 1,000 mg in sodium chloride 0.9 % 60 mL IVPB, 1,000 mg, IntraVENous, Once, Marya Anne MD    dextrose 5 % infusion, , , ,     ceFAZolin (ANCEF) 2 g injection, , , ,     NIFEdipine (ADALAT CC) extended release tablet 30 mg, 30 mg, Oral, Daily, Ne Hamilton MD, 30 mg at 10/11/22 1352    cetirizine (ZYRTEC) tablet 5 mg, 5 mg, Oral, Daily, Ne Hamilton MD, 5 mg at 10/11/22 1205    acetaminophen (TYLENOL) tablet 1,000 mg, 1,000 mg, Oral, TID, Marilee , APRN - CNP, 1,000 mg at 10/11/22 1352    oxyCODONE (ROXICODONE) immediate release tablet 5 mg, 5 mg, Oral, Q4H PRN, Marilee , APRN - CNP, 5 mg at 10/11/22 1521    morphine (PF) injection 2 mg, 2 mg, IntraVENous, Q4H PRN, 2 mg at 10/12/22 0918 **OR** morphine injection 4 mg, 4 mg, IntraVENous, Q4H PRN, Ne Hamilton MD    tetanus & diphtheria toxoids (adult) 5-2 LFU injection 0.5 mL, 0.5 mL, IntraMUSCular, Once, Moe PRESTON MD    sodium chloride flush 0.9 % injection 5-40 mL, 5-40 mL, IntraVENous, 2 times per day, Claudeen Meter, MD, 10 mL at 10/11/22 2135    sodium chloride flush 0.9 % injection 5-40 mL, 5-40 mL, IntraVENous, PRN, Claudeen Meter, MD    0.9 % sodium chloride infusion, , IntraVENous, PRN, Claudeen Meter, MD    ondansetron (ZOFRAN-ODT) disintegrating tablet 4 mg, 4 mg, Oral, Q8H PRN **OR** ondansetron (ZOFRAN) injection 4 mg, 4 mg, IntraVENous, Q6H PRN, Claudeen Meter, MD, 4 mg at 10/12/22 0923    polyethylene glycol (GLYCOLAX) packet 17 g, 17 g, Oral, Daily PRN, Claudeen Meter, MD    acetaminophen (TYLENOL) tablet 650 mg, 650 mg, Oral, Q6H PRN **OR** acetaminophen (TYLENOL) suppository 650 mg, 650 mg, Rectal, Q6H PRN, Claudeen Meter, MD    Vitals:    10/12/22 1215 10/12/22 1312 10/12/22 1455 10/12/22 1515   BP: (!) 153/67  (!) 140/59    Pulse: 77 77 76    Resp: 16 16 16    Temp: 98.3 °F (36.8 °C)  97.2 °F (36.2 °C)    TempSrc: Oral  Temporal    SpO2: 93% 94% 93% 100%   Weight:       Height:           Body mass index is 27.46 kg/m². NAD, alla  Daughter bedside  LUE SILT M/U/R/A/LABC nerve distributions; AIN/PIN/IO intact  Radial pulse intact, RRR  Left shoulder skin clear  Active head rotation side to side intact, negative Spurling's       Imaging:  Narrative   EXAMINATION:   CT OF THE LEFT SHOULDER WITHOUT CONTRAST 10/10/2022 2:45 pm       TECHNIQUE:   CT of the left shoulder was performed without the administration of   intravenous contrast.  Multiplanar reformatted images are provided for   review. Automated exposure control, iterative reconstruction, and/or weight   based adjustment of the mA/kV was utilized to reduce the radiation dose to as   low as reasonably achievable. COMPARISON:   Left shoulder radiograph October 10, 2022       HISTORY   ORDERING SYSTEM PROVIDED HISTORY: eval proximal humerus fx   TECHNOLOGIST PROVIDED HISTORY:   Reason for exam:->eval proximal humerus fx   Decision Support Exception - unselect if not a suspected or confirmed   emergency medical condition->Emergency Medical Condition (MA)   Reason for Exam: eval proximal humerus fx       FINDINGS:   Bones: Osseous mineralization is decreased. There is an acute and displaced   fracture of the proximal humerus with fracture line centered at the surgical   neck and extending to involve the greater tuberosity of the humerus. The   humeral diaphysis is displaced anteriorly in relation to the humeral head   with resultant foreshortening at the fracture site measuring approximately to   5 cm. No suspicious lytic or sclerotic osseous lesions are identified. Postoperative changes of anterior cervical fusion. Soft Tissue: Soft tissue and intramuscular edema is seen diffusely at the   fracture site. Imaged lung fields are clear. Severe atherosclerotic changes   of the aorta.        Joint: Anatomic alignment of the glenohumeral acromioclavicular joints. Impression   1. Acute and displaced fracture of the proximal left humerus involving the   surgical neck and greater tuberosity with foreshortening of the humerus by   approximately 4-5 cm. Prominent surrounding soft tissue and intramuscular   edema at the fracture site. 2. Osteopenia. Narrative       EXAM:       XR Left Shoulder Complete, 2 or More Views       EXAM DATE/TIME:       10/10/2022 1:35 pm       CLINICAL HISTORY:       ORDERING SYSTEM PROVIDED  Injury  TECHNOLOGIST PROVIDED HISTORY:  Reason for   exam:->Injury  Reason for Exam: left shoulder pain, fall       TECHNIQUE:       Two or more views of the left shoulder. COMPARISON:       No relevant prior studies available. FINDINGS:       Bones/joints:  Comminuted fracture of the left humeral neck with the humeral   shaft displaced 1 shaft with anteriorly in relation to the humeral head. Humeral head to glenoid alignment appears to remain intact. Widening of the   subacromial space could suggest shoulder joint effusion or hemarthrosis. Soft tissues:  No acute findings. Impression       1. Comminuted fracture of the left humeral neck with the humeral shaft   displaced 1 shaft with anteriorly in relation to the humeral head. Humeral   head to glenoid alignment appears to remain intact. 2.  Widening of the subacromial space could suggest shoulder joint effusion   or hemarthrosis. Narrative       EXAM:       XR Right Hip With Pelvis When Performed, 2 or 3 Views       EXAM DATE/TIME:       10/10/2022 1:35 pm       CLINICAL HISTORY:       ORDERING SYSTEM PROVIDED  trauma L hip pain  TECHNOLOGIST PROVIDED HISTORY:   Reason for exam:->trauma L hip pain  Reason for Exam: right hip pain, fall       TECHNIQUE:       Two or three views of the right hip with pelvis when performed. COMPARISON:       No relevant prior studies available.        FINDINGS: Bones/joints:  Osteoarthritic change of the left hip and scoliosis and   degenerative change of the spine. No acute fracture. No dislocation. Soft tissues:  No acute findings. Impression       Osteoarthritic change of the left hip and scoliosis and degenerative change   of the spine. No evidence of fracture. Labs:  Labs:  Recent Labs     10/12/22  0428 10/11/22  0543 10/10/22  1400   WBC 5.1 6.2 10.0   HGB 10.4* 10.6* 11.9*   HCT 31.5* 32.3* 35.9*   MCV 88.9 90.0 88.5    188 199     Lab Results   Component Value Date     (L) 10/12/2022    K 3.8 10/12/2022    CL 92 (L) 10/12/2022    CO2 28 10/12/2022    BUN 8 10/12/2022    CREATININE <0.5 (L) 10/12/2022    GLUCOSE 99 10/12/2022    CALCIUM 9.3 10/12/2022    PROT 6.9 10/10/2022    LABALBU 3.8 10/10/2022    BILITOT 0.4 10/10/2022    ALKPHOS 69 10/10/2022    AST 16 10/10/2022    ALT 11 10/10/2022    LABGLOM >60 10/12/2022    GFRAA >60 10/12/2022    AGRATIO 1.2 10/10/2022     Lab Results   Component Value Date    INR 0.93 10/10/2022       No results found for: LABA1C  No results found for: VITD25       Principal Problem:    Closed displaced fracture of surgical neck of humerus, initial encounter  Active Problems:    Osteoporosis with current pathological fracture, initial encounter    Hyponatremia  Resolved Problems:    * No resolved hospital problems. *    Discussed both conservative and surgical treatment options  Recommend surgery due to fracture pattern and displacement  Recommend reverse shoulder arthroplasty over ORIF due to patient's age, bone quality, and fracture pattern. Realistic expectations and outcomes discussed for proximal humerus fractures treated with RSA.   Primary goal is pain relief, with some improvement in function as well  Shoulder level activity is a realistic goal, but not necessarily full elevation or overhead activity  Internal and external rotation as well as strength are more difficult to predict  Will attempt to preserve and repair the tuberosities if possible to improve outcomes    Factors associated with worse outcomes include preop narcotic usage, tobacco use, unmanaged depression/anxiety/mental health illness, noncompliance, pain out of proportion to radiographic findings, hyperalgesia/central sensitization/pain hypersensitivity, poor coping skills/perseverance, workers' compensation, younger age, higher preop level of function, etc.    Risks and benefits of reverse shoulder arthroplasty with tuberosity fixation were discussed at length with the patient and an opportunity for questions was afforded. Possible complications of this surgery include, but are not limited to, dislocation, weakness, stiffness, loss of range of motion, tuberosity migration, tuberosity resorption, persistent pain, component loosening and wear, scapular notching, periprosthetic fracture, scapular and/or acromial stress fractures, arm lengthening, infection, neurovascular injury, bleeding, wound complications such as hypertrophic scarring/keloids. The patient demonstrated a good understanding of the procedure, realistic outcomes, possible complications, the post-operative restrictions and therapy required, and at this time voiced desire to proceed. An informed consent form was signed.         Claire Dudley MD  10/12/2022

## 2022-10-12 NOTE — PROGRESS NOTES
HOSPITALISTS PROGRESS NOTE    10/12/2022 9:32 AM        Name: Delta Conner . Admitted: 10/10/2022  Primary Care Provider: Amarilys Maher MD (Tel: [phone])      Brief Course: None this 69-year-old female with PMHx of CHF, hypertension, chronic hyponatremia and SIADH presented after sustaining a fall while going to the bathroom. Patient reported that her leg gave up, and denied hitting her head or loss of consciousness. Patient reported pain in her left shoulder and right hip. Of note patient recently completed a course of oral antibiotic for UTI. X-ray relief communicated fracture of left humeral neck with humeral shaft displaced intrarenal lesion humeral head. Widening of subacromial space suggestive of shoulder joint effusion or hemarthrosis. CT head negative for any acute intracranial pathology. Initial diagnostic lab work showed sodium: 126 (baseline around 130)    Interval history:   Pt seen and examined today. Overnight events noted, interval ancillary notes and labs reviewed. Serum sodium 128 this morning; on sodium tabs per nephrology recs  denied cough, SOB, chest pain, bowel or bladder dysfunction  Plan for left reverse shoulder arthroplasty with tuberosity fixation today      Assessment & Plan:     Displaced left proximal humeral fracture  Shoulder xray concerning for shoulder effusion vs hemarthrosis   Orthopedic surgery consulted: Plan for left reverse total shoulder arthroplasty today  NWB LUE, continue sling. Continue as needed pain medications  Hold anticoagulation in anticipation for surgery    Acute on chronic hyponatremia:  Na 126 on admission; trended up to 128  Nephrology on board: Urine sodium and urine osmole ordered  Restarted back on salt tabs.   Hold diuretics and continue fluid resuscitation  Monitor sodium level closely    Hypertension: Continue home dose of nifedipine XL and monitor BP closely    DVT PPX: SCDs  Code:Full Code    Disposition: Once acute medical issues have resolved    Current Medications  [START ON 10/13/2022] sodium chloride tablet 1 g, TID WC  sodium chloride tablet 2 g, BID WC  NIFEdipine (ADALAT CC) extended release tablet 30 mg, Daily  cetirizine (ZYRTEC) tablet 5 mg, Daily  acetaminophen (TYLENOL) tablet 1,000 mg, TID  oxyCODONE (ROXICODONE) immediate release tablet 5 mg, Q4H PRN  morphine (PF) injection 2 mg, Q4H PRN   Or  morphine injection 4 mg, Q4H PRN  tetanus & diphtheria toxoids (adult) 5-2 LFU injection 0.5 mL, Once  sodium chloride flush 0.9 % injection 5-40 mL, 2 times per day  sodium chloride flush 0.9 % injection 5-40 mL, PRN  0.9 % sodium chloride infusion, PRN  ondansetron (ZOFRAN-ODT) disintegrating tablet 4 mg, Q8H PRN   Or  ondansetron (ZOFRAN) injection 4 mg, Q6H PRN  polyethylene glycol (GLYCOLAX) packet 17 g, Daily PRN  acetaminophen (TYLENOL) tablet 650 mg, Q6H PRN   Or  acetaminophen (TYLENOL) suppository 650 mg, Q6H PRN      Objective:  /83   Pulse 74   Temp 98.3 °F (36.8 °C) (Oral)   Resp 16   Ht 5' 5\" (1.651 m)   Wt 165 lb (74.8 kg)   SpO2 93%   BMI 27.46 kg/m²     Intake/Output Summary (Last 24 hours) at 10/12/2022 0932  Last data filed at 10/12/2022 0732  Gross per 24 hour   Intake 470 ml   Output 2050 ml   Net -1580 ml        Wt Readings from Last 3 Encounters:   10/11/22 165 lb (74.8 kg)       Physical Examination:   General appearance:  No apparent distress, appears stated age and cooperative. HEENT: Normocephalic, sclera clear., PERRLA. Trachea midline, no adenopathy. Cardiovascular: Regular rate and rhythm, normal S1, S2. No murmur. Respiratory:Clear to auscultation bilaterally, no wheeze or crackles. GI: Abdomen soft, no tenderness, not distended, normal bowel sounds  Musculoskeletal: No cyanosis in digits. No BLE edema present  Neurology: CN 2-12 grossly intact.  No speech or motor deficits  Psych: Not agitated, appropriate affect  Skin: Warm, dry, normal turgor    Labs and Tests:  CBC:   Recent Labs     10/10/22  1400 10/11/22  0543 10/12/22  0428   WBC 10.0 6.2 5.1   HGB 11.9* 10.6* 10.4*    188 173       BMP:    Recent Labs     10/11/22  0543 10/11/22  1612 10/12/22  0428   * 125* 128*   K 4.8 4.1 3.8   CL 92* 90* 92*   CO2 25 27 28   BUN 12 10 8   CREATININE <0.5* <0.5* <0.5*   GLUCOSE 95 102* 99       Hepatic:   Recent Labs     10/10/22  1400   AST 16   ALT 11   BILITOT 0.4   ALKPHOS 69       CT SHOULDER LEFT WO CONTRAST   Final Result   1. Acute and displaced fracture of the proximal left humerus involving the   surgical neck and greater tuberosity with foreshortening of the humerus by   approximately 4-5 cm. Prominent surrounding soft tissue and intramuscular   edema at the fracture site. 2. Osteopenia. XR SHOULDER LEFT (MIN 2 VIEWS)   Final Result      1. Comminuted fracture of the left humeral neck with the humeral shaft   displaced 1 shaft with anteriorly in relation to the humeral head. Humeral   head to glenoid alignment appears to remain intact. 2.  Widening of the subacromial space could suggest shoulder joint effusion   or hemarthrosis. XR HIP 2-3 VW W PELVIS RIGHT   Final Result      Osteoarthritic change of the left hip and scoliosis and degenerative change   of the spine. No evidence of fracture. CT Head W/O Contrast   Final Result      1. No evidence of acute intracranial process. 2.  Findings of presumed small vessel ischemic deep white matter disease. 3.  Chronic sinusitis versus mucous retention cysts within both sphenoid   sinuses. CT CSpine W/O Contrast   Final Result      No evidence of fracture with degenerative changes as described. Problem List  Principal Problem:    Closed displaced fracture of surgical neck of humerus, initial encounter  Resolved Problems:    * No resolved hospital problems.  [facility] - CAMACHO DELTA LOVELACE MD   10/12/2022 9:32 AM

## 2022-10-12 NOTE — ANESTHESIA PROCEDURE NOTES
Peripheral Block    Patient location during procedure: pre-op  Reason for block: post-op pain management and at surgeon's request  Start time: 10/12/2022 3:25 PM  End time: 10/12/2022 3:25 PM  Staffing  Performed: anesthesiologist   Preanesthetic Checklist  Completed: patient identified, IV checked, site marked, risks and benefits discussed, surgical/procedural consents, equipment checked, pre-op evaluation, timeout performed, anesthesia consent given, oxygen available and monitors applied/VS acknowledged  Peripheral Block   Patient position: sitting  Prep: ChloraPrep  Provider prep: mask and sterile gloves  Patient monitoring: cardiac monitor, continuous pulse ox, frequent blood pressure checks and IV access  Block type: Brachial plexus  Interscalene  Laterality: left  Injection technique: single-shot  Guidance: ultrasound guided  Local infiltration: lidocaine  Infiltration strength: 1 %  Local infiltration: lidocaine  Dose: 3 mL    Needle   Needle gauge: 21 G  Needle localization: ultrasound guidance  Needle length: 10 cm  Assessment   Injection assessment: negative aspiration for heme, no paresthesia on injection and local visualized surrounding nerve on ultrasound  Paresthesia pain: none  Slow fractionated injection: yes  Hemodynamics: stable    Additional Notes  U/S [id number]. (1) Under ultrasound guidance, a needle was inserted and placed in close proximity to the nerve. (2) Ultrasound was also used to visualize the spread of the anesthetic in close proximity to the nerve being blocked. (3) The nerve appeared anatomically normal, and (4 there were no apparent abnormal pathological findings on the image that were readily visible and related to the nerve being blocked. (5) A permanent ultrasound image was saved in the patient's record.

## 2022-10-12 NOTE — PROGRESS NOTES
Nephrology progress Note  [phone]  [phone]   [facility]. [employer]       Patient:  Jeff Castellanos   : 1934    Brief HPI    The patient is a 80 y. o.female with significant past medical history of Hyponatremia chronic, Hypertension, CHF brought in after a fall. Labs showed hyponatremia with a sodium of 126 and so we are consulted for further evaluation and management. Stated that her legs gave away and so she fell, denied any prodromal symptoms leading to the fall including chest pains/sob/palpitations/diaphoresis, denies LOC or head injury.   She follows with Dr. Severiano Mar for hyponatremia  she has been taking salt tabs but not as prescribed  No h/o  nausea/emesis or diarrhea  CT of the head and Cspine with no acute changes  Xray of the left shoulder with comminuted fracture of the left humeral neck  Seen by orthopedics and there are plans for left reverse total shoulder arthroplasty     Subjective/Interval history    Pt seen and examined  Hyponatremia stable overall  Anticipating surgery today  BPs controlled  Has been afebrile  On room air    Review of Systems  Nausea occasional  No emesis    SHx:  No visitors at the bed-side      Meds:  Scheduled Meds:   [START ON 10/13/2022] sodium chloride  1 g Oral TID WC    sodium chloride  2 g Oral BID WC    NIFEdipine  30 mg Oral Daily    cetirizine  5 mg Oral Daily    acetaminophen  1,000 mg Oral TID    tetanus & diphtheria toxoids (adult)  0.5 mL IntraMUSCular Once    sodium chloride flush  5-40 mL IntraVENous 2 times per day     Continuous Infusions:   sodium chloride       PRN Meds:.oxyCODONE, morphine **OR** morphine, sodium chloride flush, sodium chloride, ondansetron **OR** ondansetron, polyethylene glycol, acetaminophen **OR** acetaminophen      Vitals:  BP (!) 153/67   Pulse 77   Temp 98.3 °F (36.8 °C) (Oral)   Resp 16   Ht 5' 5\" (1.651 m)   Wt 165 lb (74.8 kg)   SpO2 93%   BMI 27.46 kg/m²       Physical Exam  General : AAOx3, not in pain or respiratory distress, resting in bed  HEENT : normocephalic, atraumatic, mucosa moist, no palor or icterus  CVS: S1 S2 normal, regular rhythm, no murmurs or rubs. Lungs: Clear, no wheezing or crackles. Abd: Soft, bowel sounds normal, non-tender. Ext: b/l LE edema+  Skin: Warm. No rashes appreciated.   : bladder non-distended, no tenderness over the bladder  Neuro: Alert and oriented x 3, nonfocal.      Labs:  CBC with Differential:    Lab Results   Component Value Date/Time    WBC 5.1 10/12/2022 04:28 AM    RBC 3.55 10/12/2022 04:28 AM    HGB 10.4 10/12/2022 04:28 AM    HCT 31.5 10/12/2022 04:28 AM     10/12/2022 04:28 AM    MCV 88.9 10/12/2022 04:28 AM    MCH 29.3 10/12/2022 04:28 AM    MCHC 33.0 10/12/2022 04:28 AM    RDW 14.3 10/12/2022 04:28 AM    LYMPHOPCT 15.3 10/10/2022 02:00 PM    MONOPCT 4.2 10/10/2022 02:00 PM    BASOPCT 0.6 10/10/2022 02:00 PM    MONOSABS 0.4 10/10/2022 02:00 PM    LYMPHSABS 1.5 10/10/2022 02:00 PM    EOSABS 0.0 10/10/2022 02:00 PM    BASOSABS 0.1 10/10/2022 02:00 PM     BMP:    Lab Results   Component Value Date/Time     10/12/2022 04:28 AM    K 3.8 10/12/2022 04:28 AM    K 4.1 10/10/2022 02:00 PM    CL 92 10/12/2022 04:28 AM    CO2 28 10/12/2022 04:28 AM    BUN 8 10/12/2022 04:28 AM    LABALBU 3.8 10/10/2022 02:00 PM    CREATININE <0.5 10/12/2022 04:28 AM    CALCIUM 9.3 10/12/2022 04:28 AM    GFRAA >60 10/12/2022 04:28 AM    LABGLOM >60 10/12/2022 04:28 AM    GLUCOSE 99 10/12/2022 04:28 AM     Ionized Calcium:  No results found for: IONCA  Magnesium:    Lab Results   Component Value Date/Time    MG 1.80 10/10/2022 02:00 PM     Phosphorus:  No results found for: PHOS    Assessment/Plan:    Hyponatremia improving  Acute on chronic  Check urine sodium and urine osmolality  Will optimize sodium for anticipated surgery   Keep on fluid restriction  Continue salt tabs, increased this AM  resume diuretics soon may be after surgery while on salt tabs, also has LE edema  Hypertension controlled  Resumed Nifedipine XL  Left shoulder fracture   For surgery today   S/p Ken Adler MD  10/12/2022  Office Phone : [phone]    Thank you for allowing us to participate in the care of this pt. I willcontinue to follow along. Please call with questions or concerns.

## 2022-10-12 NOTE — PROGRESS NOTES
Time out per anesthesia and RN, patient placed on 2L NC and pulse ox, given 2 mg of versed IV, blocked to L shoulder per anesthesia completed, pt tolerated

## 2022-10-12 NOTE — PROGRESS NOTES
Phase 1 complete, pt seen by anesthesiologist. VSS, pt resting comfortably. L shoulder incision site is CDI, ice applied; sling in place. Will transfer to T.

## 2022-10-13 LAB
ANION GAP SERPL CALCULATED.3IONS-SCNC: 10 MMOL/L (ref 3–16)
BUN BLDV-MCNC: 13 MG/DL (ref 7–20)
CALCIUM SERPL-MCNC: 8.8 MG/DL (ref 8.3–10.6)
CHLORIDE BLD-SCNC: 96 MMOL/L (ref 99–110)
CO2: 22 MMOL/L (ref 21–32)
CREAT SERPL-MCNC: 0.5 MG/DL (ref 0.6–1.2)
GFR AFRICAN AMERICAN: >60
GFR NON-AFRICAN AMERICAN: >60
GLUCOSE BLD-MCNC: 124 MG/DL (ref 70–99)
HCT VFR BLD CALC: 30.1 % (ref 36–48)
HEMOGLOBIN: 10 G/DL (ref 12–16)
MCH RBC QN AUTO: 29.5 PG (ref 26–34)
MCHC RBC AUTO-ENTMCNC: 33.1 G/DL (ref 31–36)
MCV RBC AUTO: 89.2 FL (ref 80–100)
PDW BLD-RTO: 14.4 % (ref 12.4–15.4)
PLATELET # BLD: 172 K/UL (ref 135–450)
PMV BLD AUTO: 6.7 FL (ref 5–10.5)
POTASSIUM SERPL-SCNC: 4.1 MMOL/L (ref 3.5–5.1)
RBC # BLD: 3.38 M/UL (ref 4–5.2)
SODIUM BLD-SCNC: 128 MMOL/L (ref 136–145)
WBC # BLD: 6.6 K/UL (ref 4–11)

## 2022-10-13 PROCEDURE — 94760 N-INVAS EAR/PLS OXIMETRY 1: CPT

## 2022-10-13 PROCEDURE — 2580000003 HC RX 258: Performed by: NURSE PRACTITIONER

## 2022-10-13 PROCEDURE — 6370000000 HC RX 637 (ALT 250 FOR IP): Performed by: INTERNAL MEDICINE

## 2022-10-13 PROCEDURE — 97162 PT EVAL MOD COMPLEX 30 MIN: CPT

## 2022-10-13 PROCEDURE — 97530 THERAPEUTIC ACTIVITIES: CPT

## 2022-10-13 PROCEDURE — 51798 US URINE CAPACITY MEASURE: CPT

## 2022-10-13 PROCEDURE — 6370000000 HC RX 637 (ALT 250 FOR IP): Performed by: NURSE PRACTITIONER

## 2022-10-13 PROCEDURE — 97535 SELF CARE MNGMENT TRAINING: CPT

## 2022-10-13 PROCEDURE — 2580000003 HC RX 258: Performed by: ANESTHESIOLOGY

## 2022-10-13 PROCEDURE — 80048 BASIC METABOLIC PNL TOTAL CA: CPT

## 2022-10-13 PROCEDURE — 85027 COMPLETE CBC AUTOMATED: CPT

## 2022-10-13 PROCEDURE — 1200000000 HC SEMI PRIVATE

## 2022-10-13 PROCEDURE — 2500000003 HC RX 250 WO HCPCS: Performed by: NURSE PRACTITIONER

## 2022-10-13 PROCEDURE — 6360000002 HC RX W HCPCS: Performed by: NURSE PRACTITIONER

## 2022-10-13 PROCEDURE — 99024 POSTOP FOLLOW-UP VISIT: CPT | Performed by: NURSE PRACTITIONER

## 2022-10-13 PROCEDURE — 97165 OT EVAL LOW COMPLEX 30 MIN: CPT

## 2022-10-13 PROCEDURE — APPNB45 APP NON BILLABLE 31-45 MINUTES: Performed by: NURSE PRACTITIONER

## 2022-10-13 PROCEDURE — 2700000000 HC OXYGEN THERAPY PER DAY

## 2022-10-13 RX ORDER — OXYCODONE HYDROCHLORIDE 5 MG/1
5 TABLET ORAL EVERY 6 HOURS PRN
Qty: 20 TABLET | Refills: 0 | Status: SHIPPED | OUTPATIENT
Start: 2022-10-13 | End: 2022-10-14 | Stop reason: HOSPADM

## 2022-10-13 RX ORDER — TOLVAPTAN 15 MG/1
7.5 TABLET ORAL ONCE
Status: COMPLETED | OUTPATIENT
Start: 2022-10-13 | End: 2022-10-13

## 2022-10-13 RX ADMIN — Medication 10 ML: at 08:14

## 2022-10-13 RX ADMIN — ACETAMINOPHEN 1000 MG: 500 TABLET ORAL at 08:14

## 2022-10-13 RX ADMIN — CEFAZOLIN 2000 MG: 2 INJECTION, POWDER, FOR SOLUTION INTRAMUSCULAR; INTRAVENOUS at 01:04

## 2022-10-13 RX ADMIN — ACETAMINOPHEN 1000 MG: 500 TABLET ORAL at 16:23

## 2022-10-13 RX ADMIN — MORPHINE SULFATE 4 MG: 4 INJECTION, SOLUTION INTRAMUSCULAR; INTRAVENOUS at 18:34

## 2022-10-13 RX ADMIN — Medication 10 ML: at 01:13

## 2022-10-13 RX ADMIN — CETIRIZINE HYDROCHLORIDE 5 MG: 10 TABLET, FILM COATED ORAL at 08:14

## 2022-10-13 RX ADMIN — TOLVAPTAN 7.5 MG: 15 TABLET ORAL at 12:43

## 2022-10-13 RX ADMIN — OXYCODONE 5 MG: 5 TABLET ORAL at 21:04

## 2022-10-13 RX ADMIN — Medication 10 ML: at 21:01

## 2022-10-13 RX ADMIN — NIFEDIPINE 30 MG: 30 TABLET, EXTENDED RELEASE ORAL at 08:14

## 2022-10-13 RX ADMIN — CEFAZOLIN 2000 MG: 2 INJECTION, POWDER, FOR SOLUTION INTRAMUSCULAR; INTRAVENOUS at 08:17

## 2022-10-13 ASSESSMENT — PAIN DESCRIPTION - DESCRIPTORS
DESCRIPTORS: ACHING

## 2022-10-13 ASSESSMENT — PAIN DESCRIPTION - LOCATION
LOCATION: SHOULDER
LOCATION: ARM
LOCATION: SHOULDER

## 2022-10-13 ASSESSMENT — PAIN DESCRIPTION - PAIN TYPE: TYPE: SURGICAL PAIN

## 2022-10-13 ASSESSMENT — PAIN - FUNCTIONAL ASSESSMENT
PAIN_FUNCTIONAL_ASSESSMENT: PREVENTS OR INTERFERES SOME ACTIVE ACTIVITIES AND ADLS

## 2022-10-13 ASSESSMENT — PAIN SCALES - GENERAL
PAINLEVEL_OUTOF10: 3
PAINLEVEL_OUTOF10: 4
PAINLEVEL_OUTOF10: 3
PAINLEVEL_OUTOF10: 2
PAINLEVEL_OUTOF10: 3
PAINLEVEL_OUTOF10: 8

## 2022-10-13 ASSESSMENT — PAIN DESCRIPTION - ORIENTATION
ORIENTATION: RIGHT
ORIENTATION: LEFT
ORIENTATION: LEFT
ORIENTATION: RIGHT
ORIENTATION: RIGHT
ORIENTATION: LEFT

## 2022-10-13 ASSESSMENT — PAIN DESCRIPTION - FREQUENCY: FREQUENCY: INTERMITTENT

## 2022-10-13 NOTE — ANESTHESIA POSTPROCEDURE EVALUATION
Department of Anesthesiology  Postprocedure Note    Patient: Dilan Kiser  MRN: [id number]  YOB: 1934  Date of evaluation: 10/12/2022      Procedure Summary     Date: 10/12/22 Room / Location: [facility]    Anesthesia Start: 1632 Anesthesia Stop:     Procedure: LEFT REVERSE TOTAL SHOULDER ARTHROPLASTY WITH TUBEROSITY FIXATION - Eli Boas; REGIONAL BLOCK (Left: Shoulder) Diagnosis:       Closed fracture of neck of humerus, unspecified laterality, initial encounter      (Closed fracture of neck of humerus, unspecified laterality, initial encounter [C26.945D])    Surgeons: Hilario Ojeda MD Responsible Provider: Shellie Grubbs MD    Anesthesia Type: general, regional ASA Status: 3          Anesthesia Type: No value filed.     Emy Phase I: Emy Score: 9    Emy Phase II:        Anesthesia Post Evaluation    Patient location during evaluation: PACU  Patient participation: complete - patient participated  Level of consciousness: awake  Airway patency: patent  Nausea & Vomiting: no vomiting and no nausea  Complications: no  Cardiovascular status: hemodynamically stable  Respiratory status: acceptable  Hydration status: stable  Multimodal analgesia pain management approach

## 2022-10-13 NOTE — PROGRESS NOTES
Physical [facility] Department   Phone: [phone]    Physical Therapy    [x] Initial Evaluation            [] Daily Treatment Note         [] Discharge Summary      Patient: Margaret Black   : 1934   MRN: [id number]   Date of Service:  10/13/2022  Admitting Diagnosis: Closed displaced fracture of surgical neck of humerus, initial encounter  Current Admission Summary: Pt in via [address] [facility] from home. Pt states she fell walking in to her bathroom, she states her daughter tried to call her and when she didn't answer she wet to check on her and found her down. Pt complains of left shoulder pain, right hip pain. Denies LOC or hitting head. She states she takes a baby aspirin daily. Past Medical History:  has a past medical history of CHF (congestive heart failure) ([address] and Hypertension. Past Surgical History:  has a past surgical history that includes Cholecystectomy;  section; and shoulder surgery (Left, 10/12/2022). Discharge Recommendations: Margaret Black scored a 10/24 on the AM-PAC short mobility form. Current research shows that an AM-PAC score of 17 or less is typically not associated with a discharge to the patient's home setting. Based on the patient's AM-PAC score and their current functional mobility deficits, it is recommended that the patient have 5-7 sessions per week of Physical Therapy at d/c to increase the patient's independence. At this time, this patient demonstrates complex nursing, medical, and rehabilitative needs, and would benefit from intensive rehabilitation services upon discharge from the Inpatient setting.   This patient demonstrates the ability to participate in and benefit from an intensive therapy program with a coordinated interdisciplinary team approach to foster frequent, structured, and documented communication among disciplines, who will work together to establish, prioritize, and achieve treatment goals. Please see assessment section for further patient specific details. If patient discharges prior to next session this note will serve as a discharge summary. Please see below for the latest assessment towards goals.     DME Required For Discharge: DME to be determined at next level of care  Precautions/Restrictions: high fall risk, weight bearing  Weight Bearing Restrictions: non weight bearing  [] Right Upper Extremity  [x] Left Upper Extremity [] Right Lower Extremity  [] Left Lower Extremity     Required Braces/Orthotics: abductor sling   [] Right  [x] Left  Positional Restrictions:no PROM or AROM L shoulder movement, okay for AROM elbow/forearm, wrist and hand    Pre-Admission Information   Lives With: daughter               Type of Home: house  Home Layout: two level, 1/2 bath on main level, bedroom/bathroom upstairs, chair lift to 2nd level  Home Access:  2 step to enter without rails , has portable ramp   Bathroom Layout: tub/shower unit, walk in shower, uses WIS  [place] Equipment: grab bars in shower, shower chair, toilet raiser  Toilet Height: standard height  Home Equipment: rolling walker, manual wheelchair, reacher, oxygen only at night; purewick at nights  Transfer Assistance: Independent without use of device  Ambulation Assistance:modified independent with use of FWW in house, w/c in community  ADL Assistance: independent with all ADL's  IADL Assistance: requires assistance with all homemaking tasks, requires assistance for medication management  Active :        [] Yes                 [x] No daughter provides transportation  Hand Dominance: [] Left                 [x] Right  Hobbies: enjoys reading the paper, watching TV  Recent Falls: 1 recent fall in last 6 months prior to this fall that lead to this admission     Examination   Vision:   Vision Corrective Device: wears glasses at all times  Hearing:   hard of hearing, left hearing aid, right hearing aid  Observation:   General Observation:  L sling in place on arrival, surgical dressing c/d/i  Posture:   Kyphotic and rounded shoulders  Sensation:   reports numbness and tingling in all extremities- reports this is chronic  ROM:   (B) LE AROM WFL  Strength:   (B) LE strength grossly 3  Decision Making: medium complexity  Clinical Presentation: evolving      Subjective  General: patient supine in bed with daughter present on arrival, agreeable to OT/PT evaluation. Pt on 1L O2 resting at Spo2 93%  Pain: 5/10. Location: buttocks, 0/10 L shoulder d/t nerve block  Pain Interventions: patient denies pain interventions and repositioned        Functional Mobility  Bed Mobility  Supine to Sit: maximum assistance  Scooting: maximum assistance  Comments: HOB elevated, pt able to initiate BLE movement OOB, requires A to reposition hips and elevate trunk  Transfers  Sit to stand transfer: 2 person assistance with mod A   Stand to sit transfer: 2 person assistance with modA   Stand step transfer: 2 person assistance with mod A   Comments: stand step transfer from EOB to recliner with R UE HHA and mod A x2   Ambulation  Ambulation not tested on this date secondary to weakness and balance deficits. Stair Mobility  Stair mobility not completed on this date. Comments:  Wheelchair Mobility:  No w/c mobility completed on this date.   Comments:  Balance  Static Sitting Balance: fair: maintains balance at CGA without use of UE support  Dynamic Sitting Balance: fair: maintains balance at CGA without use of UE support  Static Standing Balance: poor (-): requires max (A) to maintain balance  Dynamic Standing Balance: poor (-): requires max (A) to maintain balance  Comments: Hair care and oral care performed while sitting in recliner (see OT notes)     Other Therapeutic Interventions    Functional Outcomes  AM-PAC Inpatient Mobility Raw Score : 10              Cognition  Overall Cognitive Status: WFL  Following Commands: follows one step commands consistently  Attention Span: difficulty dividing attention  Safety Judgement: decreased awareness of need for assistance, decreased awareness of need for safety  Insights: decreased awareness of deficits  Orientation:    alert and oriented x 4  Command Following:   accurately follows one step commands    Education  Barriers To Learning: cognition and hearing  Patient Education: patient educated on goals, PT role and benefits, plan of care, precautions, weight-bearing education, general safety, discharge recommendations  Learning Assessment:  patient verbalizes understanding, would benefit from continued reinforcement    Assessment  Activity Tolerance: Pt on 1L O2 with Spo2 >92% throughout session. Impairments Requiring Therapeutic Intervention: decreased functional mobility, decreased ROM, decreased strength, decreased safety awareness, decreased cognition, decreased endurance, decreased sensation, decreased balance, increased pain, decreased posture  Prognosis: good  Clinical Assessment: Pt s/p left reverse total Shoulder Arthroplasty after fall at home. At this time, pt requires assistance x2 to complete functional transfers with R UE HHA.  Pt would continue to benefit from continued skilled PT to address above deficits and safely return to PLOF   Safety Interventions: patient left in chair, chair alarm in place, call light within reach, gait belt, patient at risk for falls, nurse notified, and family/caregiver present    Plan  Frequency: 7 x/week  Current Treatment Recommendations: strengthening, balance training, functional mobility training, transfer training, gait training, stair training, endurance training, cognitive reorientation, and pain management    Goals  Patient Goals: none stated    Short Term Goals:  Time Frame: upon discharge   Patient will complete bed mobility at supervision   Patient will complete transfers at contact guard assistance   Patient will ambulate 15 ft with use of LRAD at contact guard assistance  Patient will ascend/descend 2 stairs with (R) ascending handrail at minimal assistance    Therapy Session Time      Individual Group Co-treatment   Time In     1044   Time Out     1137   Minutes     53     Timed Code Treatment Minutes:   38  Total Treatment Minutes:  48       Electronically Signed By: Lynette Sanabria [place] [id number], [place], [address]

## 2022-10-13 NOTE — PROGRESS NOTES
Exam  General : AAOx3, not in pain or respiratory distress, resting in bed  HEENT : normocephalic, atraumatic, mucosa moist, no palor or icterus  CVS: S1 S2 normal, regular rhythm, no murmurs or rubs. Lungs: Clear, no wheezing or crackles. Abd: Soft, bowel sounds normal, non-tender.   Ext: b/l LE edema+ left shoulder sling+  : bladder non-distended, no tenderness over the bladder  Neuro: Alert and oriented x 3, nonfocal.      Labs:  CBC with Differential:    Lab Results   Component Value Date/Time    WBC 6.6 10/13/2022 05:33 AM    RBC 3.38 10/13/2022 05:33 AM    HGB 10.0 10/13/2022 05:33 AM    HCT 30.1 10/13/2022 05:33 AM     10/13/2022 05:33 AM    MCV 89.2 10/13/2022 05:33 AM    MCH 29.5 10/13/2022 05:33 AM    MCHC 33.1 10/13/2022 05:33 AM    RDW 14.4 10/13/2022 05:33 AM    LYMPHOPCT 15.3 10/10/2022 02:00 PM    MONOPCT 4.2 10/10/2022 02:00 PM    BASOPCT 0.6 10/10/2022 02:00 PM    MONOSABS 0.4 10/10/2022 02:00 PM    LYMPHSABS 1.5 10/10/2022 02:00 PM    EOSABS 0.0 10/10/2022 02:00 PM    BASOSABS 0.1 10/10/2022 02:00 PM     BMP:    Lab Results   Component Value Date/Time     10/13/2022 05:33 AM    K 4.1 10/13/2022 05:33 AM    K 4.1 10/10/2022 02:00 PM    CL 96 10/13/2022 05:33 AM    CO2 22 10/13/2022 05:33 AM    BUN 13 10/13/2022 05:33 AM    LABALBU 3.8 10/10/2022 02:00 PM    CREATININE 0.5 10/13/2022 05:33 AM    CALCIUM 8.8 10/13/2022 05:33 AM    GFRAA >60 10/13/2022 05:33 AM    LABGLOM >60 10/13/2022 05:33 AM    GLUCOSE 124 10/13/2022 05:33 AM     Ionized Calcium:  No results found for: IONCA  Magnesium:    Lab Results   Component Value Date/Time    MG 1.80 10/10/2022 02:00 PM     Phosphorus:  No results found for: PHOS    Assessment/Plan:    Hyponatremia improving  Acute on chronic  Check urine sodium and urine osmolality  Keep on fluid restriction  Continue salt tabs  resume diuretics soon may be after surgery while on salt tabs, also has LE edema  Give low dose tolvaptan today  Hypertension controlled  Resumed Nifedipine XL  Left shoulder fracture   S/p Left reverse shoulder arthroplasty with tuberosity fixatio 10/12  S/p Lexi Mcmillan MD  10/13/2022  Office Phone : [phone]    Thank you for allowing us to participate in the care of this pt. I willcontinue to follow along. Please call with questions or concerns.

## 2022-10-13 NOTE — PROGRESS NOTES
Shift assessment completed. Neuro WNL. Reports pain 2/10 to L shoulder at this time. AM meds administered per MAR. Surgical incision CDI; redness noted around site; no drainage noted; sling remains in place. The care plan and education has been reviewed and mutually agreed upon with the patient. Standard safety measures in place.

## 2022-10-13 NOTE — CONSULTS
Morgan Bernheim  10/13/2022  [id number]    Rehab Brief Consult:    Patient is pending post op therapy evaluations. ARU vs SNF pending today's therapy function. If appropriate for ARU, able to accept when approved by primary team. Liaison to notify treatment team once decision is made. Thank you for the consultation.     Suzanna Torres MD 10/13/2022 11:42 AM

## 2022-10-13 NOTE — PLAN OF CARE
Problem: Neurosensory - Adult  Goal: Achieves stable or improved neurological status  Outcome: Progressing     Problem: Skin/Tissue Integrity - Adult  Goal: Skin integrity remains intact  Outcome: Progressing  Flowsheets (Taken 10/13/2022 0757)  Skin Integrity Remains Intact: Monitor for areas of redness and/or skin breakdown  Goal: Incisions, wounds, or drain sites healing without S/S of infection  Outcome: Progressing     Problem: Musculoskeletal - Adult  Goal: Return mobility to safest level of function  Outcome: Progressing  Goal: Maintain proper alignment of affected body part  Outcome: Progressing  Goal: Return ADL status to a safe level of function  Outcome: Progressing     Problem: Genitourinary - Adult  Goal: Absence of urinary retention  Outcome: Progressing

## 2022-10-13 NOTE — PROGRESS NOTES
[id number] [facility] Orthopedic Surgery   Progress Note    CHIEF COMPLAINT/DIAGNOSIS: S/p left reverse total Shoulder Arthroplasty    SUBJECTIVE: Patient is sitting up in the bed with her daughter at bedside. Sling is in place. She reports mild to moderate shoulder pain. Denies any new paresthesias. Patient has baseline peripheral neuropathy affecting both hands and feet. OBJECTIVE  Physical    VITALS:  /70   Pulse 74   Temp 97.5 °F (36.4 °C) (Oral)   Resp 16   Ht 5' 5\" (1.651 m)   Wt 165 lb (74.8 kg)   SpO2 95%   BMI 27.46 kg/m²     GENERAL: Alert and oriented x3, in no acute distress. MUSCULOSKELETAL: Able to flex and extend the wrist on the operative side without issue. INCISION:  Covered with post-op dressing; clean, dry and intact. ROM: left shoulder deferred. External rotation sling/immobilizer in place and well fitted. Sensory:  Intact to light touch in axillary, median radial, ulnar distributions. Vascular:   2+ radial pulses with brisk cap refill. Data    ALL MEDICATIONS HAVE BEEN REVIEWED    CBC:   Recent Labs     10/11/22  0543 10/12/22  0428 10/13/22  0533   WBC 6.2 5.1 6.6   HGB 10.6* 10.4* 10.0*   HCT 32.3* 31.5* 30.1*    173 172     BMP:   Recent Labs     10/11/22  1612 10/12/22  0428 10/13/22  0533   * 128* 128*   K 4.1 3.8 4.1   CL 90* 92* 96*   CO2 27 28 22   BUN 10 8 13   CREATININE <0.5* <0.5* 0.5*     INR:   Recent Labs     10/10/22  1643   INR 0.93     Intra-Op films show stable reverse total shoulder arthroplasty with cemented humeral stem and tuberosity fixation. ASSESSMENT:  S/p left reverse total Shoulder Arthroplasty for fracture (10/12/2022), POD#1  Hyponatremia  Acute postop blood loss anemia as expected    PLAN:   - WB status:  NWB; continue sling - reviewed post op precautions. Exercises handout provided. Keep a pillow beneath the patient's elbow to keep the forearm in front of the body (do not allow the elbow to slide backwards onto the bed). Loosen the sling 2x/day and have patient perform gentle elbow ROM, forearm rotation, and wrist ROM. NO shoulder ROM at all. Hyponatremia :salt tabs per direction of nephrology  - DVT prophylaxis: ASA 81 mg twice daily x14 days; SCDs/ankle pumps/ambulation  - OT/PT  - Pain Control: Current regimen, prescription for DC in chart due to orthopaedic surgical procedure/condition, patient may require pain medication for up to 6-8 weeks. - Expected post op acute blood loss anemia: 10/30.1  - ID: Ancef x2 completed postop  - Dispo: Await therapy eval; expect SNF versus ARU    Follow-up with Dr. Holli Galeano in approximately 2 weeks. Office# [phone]  No future appointments.     ADELINA Martins - CNP  10/13/2022  12:24 PM

## 2022-10-13 NOTE — PROGRESS NOTES
[facility] [address]   Phone: [phone]    Occupational Therapy    [x] Initial Evaluation            [] Daily Treatment Note         [] Discharge Summary      Patient: Sara Shore   : 1934   MRN: [id number]   Date of Service:  10/13/2022    Admitting Diagnosis:  Closed displaced fracture of surgical neck of humerus, initial encounter  Current Admission Summary: 80 y.o. female who presented to [facility] ER today with complaints of left shoulder pain after she fell in her bathroom. C TL shoulder showed A displaced fracture of the surgical neck involving the greater tuberosity s/p L RSA 10/12/22    Past Medical History:  has a past medical history of CHF (congestive heart failure) ([address] and Hypertension. Past Surgical History:  has a past surgical history that includes Cholecystectomy;  section; and shoulder surgery (Left, 10/12/2022). Discharge Recommendations: Sara Shore scored a  on the AM-PAC ADL Inpatient form. Current research shows that an AM-PAC score of 17 or less is typically not associated with a discharge to the patient's home setting. Based on the patient's AM-PAC score and their current ADL deficits, it is recommended that the patient have 5-7 sessions per week of Occupational Therapy at d/c to increase the patient's independence. At this time, this patient demonstrates complex nursing, medical, and rehabilitative needs, and would benefit from intensive rehabilitation services upon discharge from the Inpatient setting. This patient demonstrates the ability to participate in and benefit from an intensive therapy program with a coordinated interdisciplinary team approach to foster frequent, structured, and documented communication among disciplines, who will work together to establish, prioritize, and achieve treatment goals. Please see assessment section for further patient specific details.     If patient discharges prior to next session this note will serve as a discharge summary. Please see below for the latest assessment towards goals.       DME Required For Discharge: DME to be determined at next level of care    Precautions/Restrictions: high fall risk, weight bearing  Weight Bearing Restrictions: non weight bearing  [] Right Upper Extremity  [] Left Upper Extremity [] Right Lower Extremity  [] Left Lower Extremity     Required Braces/Orthotics:  abductor sling   [] Right  [x] Left  Positional Restrictions: no PROM or AROM L shoulder movement, okay for AROM elbow/forearm, wrist and hand    Pre-Admission Information   Lives With: daughter    Type of Home: house  Home Layout: two level, 1/2 bath on main level, bedroom/bathroom upstairs, chair lift to 2nd level  Home Access:  2 step to enter without rails , has portable ramp   Bathroom Layout: tub/shower unit, walk in shower, uses WIS  [place] Equipment: grab bars in shower, shower chair, toilet raiser  Toilet Height: standard height  Home Equipment: rolling walker, manual wheelchair, reacher, oxygen only at night; purewick at nights  Transfer Assistance: Independent without use of device  Ambulation Assistance:modified independent with use of FWW in house, w/c in community  ADL Assistance: independent with all ADL's  IADL Assistance: requires assistance with all homemaking tasks, requires assistance for medication management  Active :        [] Yes  [x] No daughter provides transportation  Hand Dominance: [] Left  [x] Right  Hobbies: enjoys reading the paper, watching TV  Recent Falls: 1 recent fall in last 6 months prior to this fall that lead to this admission    Examination   Vision:   Vision Corrective Device: wears glasses at all times  Hearing:   hard of hearing, left hearing aid, right hearing aid  Observation:   General Observation:  L sling in place on arrival, surgical dressing c/d/i  Posture:   Kyphotic and rounded shoulders  Sensation:   reports numbness and tingling in all extremities- reports this is chronic  Tone:   Normotonic    ROM:   R UE grossly WFL, L NT  Strength:   deferred    Decision Making: low complexity  Clinical Presentation: stable      Subjective  General: patient supine in bed with daughter present on arrival, agreeable to OT/PT evaluation  Pain: 5/10. Location: buttocks, 0/10 L shoulder d/t nerve block  Pain Interventions: patient denies pain interventions and repositioned         Activities of Daily Living  Basic Activities of Daily Living  Grooming: setup assistance minimal assistance Comment: seated in chair, set up for oral care; min A for thoroughness to comb hair  Lower Extremity Dressing: dependent Comment: socks bed level  Instrumental Activities of Daily Living  No IADL completed on this date. Functional Mobility  Bed Mobility  Supine to Sit: maximum assistance  Scooting: maximum assistance  Comments: HOB elevated, pt able to initiate BLE movement OOB, requires A to reposition hips and elevate trunk  Transfers  Sit to stand transfer:2 person assistance with Mod Ax2   Stand to sit transfer: 2 person assistance with Mod Ax2   Stand step transfer: 2 person assistance with mod Ax2   Comments: stands from bed with Mod A x2, pt demo significant trunk flexion and minimal improvement with verbal cues. Takes ~3steps to chair with Mod A x2, verbal cues for NWB LUE and continues to demo trunk flexion    Functional Mobility:  Sitting Balance: stand by assistance, contact guard assistance, fluctuating while seated EOB, improves when better position with B feet on floor and RUE on bed rail. Standing Balance: 2 person assistance with mod Ax2 .       Other Therapeutic Interventions    Functional Outcomes  AM-PAC Inpatient Daily Activity Raw Score: 11    Cognition  Overall Cognitive Status: WFL  Following Commands: follows one step commands consistently  Attention Span: difficulty dividing attention  Safety Judgement: decreased awareness of need

## 2022-10-13 NOTE — PROGRESS NOTES
HOSPITALISTS PROGRESS NOTE    10/13/2022 8:39 AM        Name: Cristina Villanueva . Admitted: 10/10/2022  Primary Care Provider: Man Barraza MD (Tel: [phone])      Brief Course: None this 57-year-old female with PMHx of CHF, hypertension, chronic hyponatremia and SIADH presented after sustaining a fall while going to the bathroom. Patient reported that her leg gave up, and denied hitting her head or loss of consciousness. Patient reported pain in her left shoulder and right hip. Of note patient recently completed a course of oral antibiotic for UTI. X-ray relief communicated fracture of left humeral neck with humeral shaft displaced intrarenal lesion humeral head. Widening of subacromial space suggestive of shoulder joint effusion or hemarthrosis. CT head negative for any acute intracranial pathology. Initial diagnostic lab work showed sodium: 126 (baseline around 130)    Interval history:   Pt seen and examined today. Overnight events noted, interval ancillary notes and labs reviewed. Status post left reverse shoulder arthroplasty with tuberosity fixation on 10/12/22  Serum sodium remains around 128 this morning. Tolvaptan 7.5 mg x 1 given today  Sitting in chair; reported that she is feeling better today   denied cough, SOB, chest pain, nausea, vomiting or abdominal pain      Assessment & Plan:     Displaced left proximal humeral fracture  Shoulder xray concerning for shoulder effusion vs hemarthrosis   Orthopedic surgery consulted: Left reverse shoulder arthroplasty with tuberosity fixation on 10/12/22  NWB LUE, continue sling. Continue as needed pain medications  Hold anticoagulation in anticipation for surgery    Acute on chronic hyponatremia:  Na 126 on admission; trended up to 128  Nephrology on board: Urine sodium and urine osmole ordered  Restarted back on salt tabs.   Hold diuretics and continue fluid resuscitation  Monitor sodium level closely    Hypertension: Continue home dose of nifedipine XL and monitor BP closely    DVT PPX: SCDs  Code:Full Code    Disposition: Once acute medical issues have resolved    Current Medications  ceFAZolin (ANCEF) 2,000 mg in dextrose 5 % 50 mL IVPB (mini-bag), Q8H  ceFAZolin (ANCEF) 2,000 mg in dextrose 5 % 50 mL IVPB (mini-bag), Once  sodium chloride flush 0.9 % injection 5-40 mL, 2 times per day  sodium chloride flush 0.9 % injection 5-40 mL, PRN  0.9 % sodium chloride infusion, PRN  meperidine (DEMEROL) injection 12.5 mg, Q5 Min PRN  fentaNYL (SUBLIMAZE) injection 25 mcg, Q5 Min PRN  tranexamic acid (CYKLOKAPRON) 1,000 mg in sodium chloride 0.9 % 60 mL IVPB, Once  NIFEdipine (ADALAT CC) extended release tablet 30 mg, Daily  cetirizine (ZYRTEC) tablet 5 mg, Daily  acetaminophen (TYLENOL) tablet 1,000 mg, TID  oxyCODONE (ROXICODONE) immediate release tablet 5 mg, Q4H PRN  morphine (PF) injection 2 mg, Q4H PRN   Or  morphine injection 4 mg, Q4H PRN  sodium chloride flush 0.9 % injection 5-40 mL, 2 times per day  sodium chloride flush 0.9 % injection 5-40 mL, PRN  0.9 % sodium chloride infusion, PRN  ondansetron (ZOFRAN-ODT) disintegrating tablet 4 mg, Q8H PRN   Or  ondansetron (ZOFRAN) injection 4 mg, Q6H PRN  polyethylene glycol (GLYCOLAX) packet 17 g, Daily PRN  acetaminophen (TYLENOL) tablet 650 mg, Q6H PRN   Or  acetaminophen (TYLENOL) suppository 650 mg, Q6H PRN      Objective:  /70   Pulse 74   Temp 97.5 °F (36.4 °C) (Oral)   Resp 16   Ht 5' 5\" (1.651 m)   Wt 165 lb (74.8 kg)   SpO2 95%   BMI 27.46 kg/m²     Intake/Output Summary (Last 24 hours) at 10/13/2022 0839  Last data filed at 10/12/2022 1928  Gross per 24 hour   Intake 1145 ml   Output 300 ml   Net 845 ml        Wt Readings from Last 3 Encounters:   10/11/22 165 lb (74.8 kg)       Physical Examination:   General appearance:  No apparent distress, appears stated age and cooperative.   HEENT: Normocephalic, sclera clear., PERRLA. Trachea midline, no adenopathy. Cardiovascular: Regular rate and rhythm, normal S1, S2. No murmur. Respiratory:Clear to auscultation bilaterally, no wheeze or crackles. GI: Abdomen soft, no tenderness, not distended, normal bowel sounds  Musculoskeletal: No cyanosis in digits. BLE edema present  Neurology: CN 2-12 grossly intact. No speech or motor deficits  Psych: Not agitated, appropriate affect  Skin: Warm, dry, normal turgor    Labs and Tests:  CBC:   Recent Labs     10/11/22  0543 10/12/22  0428 10/13/22  0533   WBC 6.2 5.1 6.6   HGB 10.6* 10.4* 10.0*    173 172       BMP:    Recent Labs     10/11/22  1612 10/12/22  0428 10/13/22  0533   * 128* 128*   K 4.1 3.8 4.1   CL 90* 92* 96*   CO2 27 28 22   BUN 10 8 13   CREATININE <0.5* <0.5* 0.5*   GLUCOSE 102* 99 124*       Hepatic:   Recent Labs     10/10/22  1400   AST 16   ALT 11   BILITOT 0.4   ALKPHOS 69       XR SHOULDER LEFT 1 VW   Final Result      CT SHOULDER LEFT WO CONTRAST   Final Result   1. Acute and displaced fracture of the proximal left humerus involving the   surgical neck and greater tuberosity with foreshortening of the humerus by   approximately 4-5 cm. Prominent surrounding soft tissue and intramuscular   edema at the fracture site. 2. Osteopenia. XR SHOULDER LEFT (MIN 2 VIEWS)   Final Result      1. Comminuted fracture of the left humeral neck with the humeral shaft   displaced 1 shaft with anteriorly in relation to the humeral head. Humeral   head to glenoid alignment appears to remain intact. 2.  Widening of the subacromial space could suggest shoulder joint effusion   or hemarthrosis. XR HIP 2-3 VW W PELVIS RIGHT   Final Result      Osteoarthritic change of the left hip and scoliosis and degenerative change   of the spine. No evidence of fracture. CT Head W/O Contrast   Final Result      1. No evidence of acute intracranial process.       2. Findings of presumed small vessel ischemic deep white matter disease. 3.  Chronic sinusitis versus mucous retention cysts within both sphenoid   sinuses. CT CSpine W/O Contrast   Final Result      No evidence of fracture with degenerative changes as described. Problem List  Principal Problem:    Closed displaced fracture of surgical neck of humerus, initial encounter  Active Problems:    Osteoporosis with current pathological fracture, initial encounter    Hyponatremia  Resolved Problems:    * No resolved hospital problems.  Jamal Fu MD   10/13/2022 8:39 AM

## 2022-10-14 ENCOUNTER — HOSPITAL ENCOUNTER (INPATIENT)
Age: 87
LOS: 11 days | Discharge: NURSING, REHAB OR LONG-TERM CARE | DRG: 560 | End: 2022-10-25
Attending: PHYSICAL MEDICINE & REHABILITATION | Admitting: PHYSICAL MEDICINE & REHABILITATION
Payer: MEDICARE

## 2022-10-14 VITALS
DIASTOLIC BLOOD PRESSURE: 82 MMHG | HEIGHT: 65 IN | BODY MASS INDEX: 27.49 KG/M2 | TEMPERATURE: 97.8 F | WEIGHT: 165 LBS | OXYGEN SATURATION: 94 % | RESPIRATION RATE: 14 BRPM | SYSTOLIC BLOOD PRESSURE: 134 MMHG | HEART RATE: 81 BPM

## 2022-10-14 DIAGNOSIS — S42.92XA CLOSED FRACTURE OF LEFT SHOULDER, INITIAL ENCOUNTER: ICD-10-CM

## 2022-10-14 PROBLEM — S42.412S LEFT SUPRACONDYLAR HUMERUS FRACTURE, SEQUELA: Status: ACTIVE | Noted: 2022-10-14

## 2022-10-14 LAB
ANION GAP SERPL CALCULATED.3IONS-SCNC: 10 MMOL/L (ref 3–16)
BUN BLDV-MCNC: 15 MG/DL (ref 7–20)
CALCIUM SERPL-MCNC: 9.2 MG/DL (ref 8.3–10.6)
CHLORIDE BLD-SCNC: 98 MMOL/L (ref 99–110)
CO2: 23 MMOL/L (ref 21–32)
CREAT SERPL-MCNC: <0.5 MG/DL (ref 0.6–1.2)
GFR AFRICAN AMERICAN: >60
GFR NON-AFRICAN AMERICAN: >60
GLUCOSE BLD-MCNC: 101 MG/DL (ref 70–99)
HCT VFR BLD CALC: 27.3 % (ref 36–48)
HEMOGLOBIN: 9 G/DL (ref 12–16)
MCH RBC QN AUTO: 29.5 PG (ref 26–34)
MCHC RBC AUTO-ENTMCNC: 32.9 G/DL (ref 31–36)
MCV RBC AUTO: 89.7 FL (ref 80–100)
PDW BLD-RTO: 14.6 % (ref 12.4–15.4)
PLATELET # BLD: 153 K/UL (ref 135–450)
PMV BLD AUTO: 7.1 FL (ref 5–10.5)
POTASSIUM SERPL-SCNC: 3.7 MMOL/L (ref 3.5–5.1)
RBC # BLD: 3.05 M/UL (ref 4–5.2)
SODIUM BLD-SCNC: 131 MMOL/L (ref 136–145)
WBC # BLD: 6.4 K/UL (ref 4–11)

## 2022-10-14 PROCEDURE — 6370000000 HC RX 637 (ALT 250 FOR IP): Performed by: NURSE PRACTITIONER

## 2022-10-14 PROCEDURE — 2580000003 HC RX 258: Performed by: NURSE PRACTITIONER

## 2022-10-14 PROCEDURE — 99024 POSTOP FOLLOW-UP VISIT: CPT | Performed by: NURSE PRACTITIONER

## 2022-10-14 PROCEDURE — 6370000000 HC RX 637 (ALT 250 FOR IP): Performed by: INTERNAL MEDICINE

## 2022-10-14 PROCEDURE — 97535 SELF CARE MNGMENT TRAINING: CPT

## 2022-10-14 PROCEDURE — 1280000000 HC REHAB R&B

## 2022-10-14 PROCEDURE — 6360000002 HC RX W HCPCS: Performed by: PHYSICAL MEDICINE & REHABILITATION

## 2022-10-14 PROCEDURE — 6360000002 HC RX W HCPCS: Performed by: NURSE PRACTITIONER

## 2022-10-14 PROCEDURE — 6370000000 HC RX 637 (ALT 250 FOR IP): Performed by: PHYSICAL MEDICINE & REHABILITATION

## 2022-10-14 PROCEDURE — 80048 BASIC METABOLIC PNL TOTAL CA: CPT

## 2022-10-14 PROCEDURE — 97530 THERAPEUTIC ACTIVITIES: CPT

## 2022-10-14 PROCEDURE — APPNB45 APP NON BILLABLE 31-45 MINUTES: Performed by: NURSE PRACTITIONER

## 2022-10-14 PROCEDURE — 85027 COMPLETE CBC AUTOMATED: CPT

## 2022-10-14 RX ORDER — ACETAMINOPHEN 325 MG/1
650 TABLET ORAL EVERY 6 HOURS PRN
Status: CANCELLED | OUTPATIENT
Start: 2022-10-14

## 2022-10-14 RX ORDER — SODIUM CHLORIDE 0.9 % (FLUSH) 0.9 %
5-40 SYRINGE (ML) INJECTION EVERY 12 HOURS SCHEDULED
Status: CANCELLED | OUTPATIENT
Start: 2022-10-14

## 2022-10-14 RX ORDER — ACETAMINOPHEN 325 MG/1
650 TABLET ORAL EVERY 4 HOURS PRN
Status: CANCELLED | OUTPATIENT
Start: 2022-10-14

## 2022-10-14 RX ORDER — ACETAMINOPHEN 650 MG/1
650 SUPPOSITORY RECTAL EVERY 6 HOURS PRN
Status: CANCELLED | OUTPATIENT
Start: 2022-10-14

## 2022-10-14 RX ORDER — SODIUM CHLORIDE 1000 MG
1 TABLET, SOLUBLE MISCELLANEOUS
Status: DISCONTINUED | OUTPATIENT
Start: 2022-10-14 | End: 2022-10-14 | Stop reason: HOSPADM

## 2022-10-14 RX ORDER — SENNA AND DOCUSATE SODIUM 50; 8.6 MG/1; MG/1
2 TABLET, FILM COATED ORAL 2 TIMES DAILY
Status: CANCELLED | OUTPATIENT
Start: 2022-10-14

## 2022-10-14 RX ORDER — NIFEDIPINE 30 MG/1
30 TABLET, EXTENDED RELEASE ORAL DAILY
Status: DISCONTINUED | OUTPATIENT
Start: 2022-10-14 | End: 2022-10-14 | Stop reason: HOSPADM

## 2022-10-14 RX ORDER — ACETAMINOPHEN 325 MG/1
650 TABLET ORAL EVERY 4 HOURS PRN
Status: DISCONTINUED | OUTPATIENT
Start: 2022-10-14 | End: 2022-10-14 | Stop reason: SDUPTHER

## 2022-10-14 RX ORDER — TRAMADOL HYDROCHLORIDE 50 MG/1
50 TABLET ORAL EVERY 4 HOURS PRN
Status: DISCONTINUED | OUTPATIENT
Start: 2022-10-14 | End: 2022-10-25 | Stop reason: HOSPADM

## 2022-10-14 RX ORDER — ONDANSETRON 4 MG/1
4 TABLET, ORALLY DISINTEGRATING ORAL EVERY 8 HOURS PRN
Status: CANCELLED | OUTPATIENT
Start: 2022-10-14

## 2022-10-14 RX ORDER — TRAMADOL HYDROCHLORIDE 50 MG/1
100 TABLET ORAL EVERY 4 HOURS PRN
Status: CANCELLED | OUTPATIENT
Start: 2022-10-14

## 2022-10-14 RX ORDER — ONDANSETRON 2 MG/ML
4 INJECTION INTRAMUSCULAR; INTRAVENOUS EVERY 6 HOURS PRN
Status: DISCONTINUED | OUTPATIENT
Start: 2022-10-14 | End: 2022-10-25 | Stop reason: HOSPADM

## 2022-10-14 RX ORDER — ACETAMINOPHEN 500 MG
1000 TABLET ORAL 3 TIMES DAILY
Status: CANCELLED | OUTPATIENT
Start: 2022-10-14

## 2022-10-14 RX ORDER — ENOXAPARIN SODIUM 100 MG/ML
40 INJECTION SUBCUTANEOUS DAILY
Status: CANCELLED | OUTPATIENT
Start: 2022-10-14

## 2022-10-14 RX ORDER — ENOXAPARIN SODIUM 100 MG/ML
40 INJECTION SUBCUTANEOUS DAILY
Status: DISCONTINUED | OUTPATIENT
Start: 2022-10-14 | End: 2022-10-25 | Stop reason: HOSPADM

## 2022-10-14 RX ORDER — SODIUM CHLORIDE 0.9 % (FLUSH) 0.9 %
5-40 SYRINGE (ML) INJECTION PRN
Status: DISCONTINUED | OUTPATIENT
Start: 2022-10-14 | End: 2022-10-25 | Stop reason: HOSPADM

## 2022-10-14 RX ORDER — SENNA AND DOCUSATE SODIUM 50; 8.6 MG/1; MG/1
2 TABLET, FILM COATED ORAL 2 TIMES DAILY
Status: DISCONTINUED | OUTPATIENT
Start: 2022-10-14 | End: 2022-10-19

## 2022-10-14 RX ORDER — TRAMADOL HYDROCHLORIDE 50 MG/1
50 TABLET ORAL EVERY 4 HOURS PRN
Status: DISCONTINUED | OUTPATIENT
Start: 2022-10-14 | End: 2022-10-14 | Stop reason: HOSPADM

## 2022-10-14 RX ORDER — KETOROLAC TROMETHAMINE 15 MG/ML
15 INJECTION, SOLUTION INTRAMUSCULAR; INTRAVENOUS EVERY 6 HOURS PRN
Status: DISCONTINUED | OUTPATIENT
Start: 2022-10-14 | End: 2022-10-14 | Stop reason: HOSPADM

## 2022-10-14 RX ORDER — TRAMADOL HYDROCHLORIDE 50 MG/1
50 TABLET ORAL EVERY 4 HOURS PRN
Qty: 12 TABLET | Refills: 0 | Status: ON HOLD | OUTPATIENT
Start: 2022-10-14 | End: 2022-10-25

## 2022-10-14 RX ORDER — NIFEDIPINE 30 MG/1
30 TABLET, EXTENDED RELEASE ORAL DAILY
Status: DISCONTINUED | OUTPATIENT
Start: 2022-10-15 | End: 2022-10-20

## 2022-10-14 RX ORDER — POLYETHYLENE GLYCOL 3350 17 G/17G
17 POWDER, FOR SOLUTION ORAL DAILY
Status: DISCONTINUED | OUTPATIENT
Start: 2022-10-14 | End: 2022-10-25 | Stop reason: HOSPADM

## 2022-10-14 RX ORDER — ACETAMINOPHEN 500 MG
1000 TABLET ORAL 3 TIMES DAILY
Status: DISCONTINUED | OUTPATIENT
Start: 2022-10-14 | End: 2022-10-25 | Stop reason: HOSPADM

## 2022-10-14 RX ORDER — ACETAMINOPHEN 650 MG/1
650 SUPPOSITORY RECTAL EVERY 6 HOURS PRN
Status: DISCONTINUED | OUTPATIENT
Start: 2022-10-14 | End: 2022-10-17

## 2022-10-14 RX ORDER — ACETAMINOPHEN 325 MG/1
650 TABLET ORAL EVERY 6 HOURS PRN
Status: DISCONTINUED | OUTPATIENT
Start: 2022-10-14 | End: 2022-10-17

## 2022-10-14 RX ORDER — ONDANSETRON 2 MG/ML
4 INJECTION INTRAMUSCULAR; INTRAVENOUS EVERY 6 HOURS PRN
Status: CANCELLED | OUTPATIENT
Start: 2022-10-14

## 2022-10-14 RX ORDER — SODIUM CHLORIDE 1000 MG
1 TABLET, SOLUBLE MISCELLANEOUS
Status: CANCELLED | OUTPATIENT
Start: 2022-10-14

## 2022-10-14 RX ORDER — SODIUM CHLORIDE 1000 MG
1 TABLET, SOLUBLE MISCELLANEOUS
Status: DISCONTINUED | OUTPATIENT
Start: 2022-10-14 | End: 2022-10-15

## 2022-10-14 RX ORDER — SODIUM CHLORIDE 1000 MG
1 TABLET, SOLUBLE MISCELLANEOUS
Qty: 42 TABLET | Refills: 0
Start: 2022-10-14 | End: 2022-10-28

## 2022-10-14 RX ORDER — SODIUM CHLORIDE 0.9 % (FLUSH) 0.9 %
5-40 SYRINGE (ML) INJECTION PRN
Status: CANCELLED | OUTPATIENT
Start: 2022-10-14

## 2022-10-14 RX ORDER — TRAMADOL HYDROCHLORIDE 50 MG/1
50 TABLET ORAL EVERY 4 HOURS PRN
Status: CANCELLED | OUTPATIENT
Start: 2022-10-14

## 2022-10-14 RX ORDER — ONDANSETRON 4 MG/1
4 TABLET, ORALLY DISINTEGRATING ORAL EVERY 8 HOURS PRN
Status: DISCONTINUED | OUTPATIENT
Start: 2022-10-14 | End: 2022-10-25 | Stop reason: HOSPADM

## 2022-10-14 RX ORDER — NIFEDIPINE 30 MG/1
30 TABLET, EXTENDED RELEASE ORAL DAILY
Status: CANCELLED | OUTPATIENT
Start: 2022-10-15

## 2022-10-14 RX ORDER — CETIRIZINE HYDROCHLORIDE 10 MG/1
5 TABLET ORAL DAILY
Status: CANCELLED | OUTPATIENT
Start: 2022-10-15

## 2022-10-14 RX ORDER — TRAMADOL HYDROCHLORIDE 50 MG/1
100 TABLET ORAL EVERY 4 HOURS PRN
Status: DISCONTINUED | OUTPATIENT
Start: 2022-10-14 | End: 2022-10-25 | Stop reason: HOSPADM

## 2022-10-14 RX ORDER — SODIUM CHLORIDE 0.9 % (FLUSH) 0.9 %
5-40 SYRINGE (ML) INJECTION EVERY 12 HOURS SCHEDULED
Status: DISCONTINUED | OUTPATIENT
Start: 2022-10-14 | End: 2022-10-15

## 2022-10-14 RX ORDER — CETIRIZINE HYDROCHLORIDE 10 MG/1
5 TABLET ORAL DAILY
Status: DISCONTINUED | OUTPATIENT
Start: 2022-10-15 | End: 2022-10-25 | Stop reason: HOSPADM

## 2022-10-14 RX ORDER — POLYETHYLENE GLYCOL 3350 17 G/17G
17 POWDER, FOR SOLUTION ORAL DAILY
Status: CANCELLED | OUTPATIENT
Start: 2022-10-14

## 2022-10-14 RX ADMIN — NIFEDIPINE 30 MG: 30 TABLET, EXTENDED RELEASE ORAL at 09:56

## 2022-10-14 RX ADMIN — OXYCODONE 5 MG: 5 TABLET ORAL at 03:51

## 2022-10-14 RX ADMIN — ONDANSETRON 4 MG: 4 TABLET, ORALLY DISINTEGRATING ORAL at 17:01

## 2022-10-14 RX ADMIN — CETIRIZINE HYDROCHLORIDE 5 MG: 10 TABLET, FILM COATED ORAL at 09:47

## 2022-10-14 RX ADMIN — POLYETHYLENE GLYCOL 3350 17 G: 17 POWDER, FOR SOLUTION ORAL at 18:47

## 2022-10-14 RX ADMIN — Medication 10 ML: at 09:58

## 2022-10-14 RX ADMIN — TRAMADOL HYDROCHLORIDE 50 MG: 50 TABLET ORAL at 19:56

## 2022-10-14 RX ADMIN — MORPHINE SULFATE 4 MG: 4 INJECTION, SOLUTION INTRAMUSCULAR; INTRAVENOUS at 06:03

## 2022-10-14 RX ADMIN — SODIUM CHLORIDE TAB 1 GM 1 G: 1 TAB at 12:34

## 2022-10-14 RX ADMIN — TRAMADOL HYDROCHLORIDE 50 MG: 50 TABLET ORAL at 15:40

## 2022-10-14 RX ADMIN — TRAMADOL HYDROCHLORIDE 50 MG: 50 TABLET ORAL at 23:49

## 2022-10-14 RX ADMIN — TRAMADOL HYDROCHLORIDE 50 MG: 50 TABLET ORAL at 11:21

## 2022-10-14 RX ADMIN — SODIUM CHLORIDE TAB 1 GM 1 G: 1 TAB at 18:47

## 2022-10-14 RX ADMIN — ACETAMINOPHEN 1000 MG: 500 TABLET ORAL at 09:51

## 2022-10-14 RX ADMIN — ENOXAPARIN SODIUM 40 MG: 100 INJECTION SUBCUTANEOUS at 18:47

## 2022-10-14 ASSESSMENT — PAIN DESCRIPTION - LOCATION
LOCATION: SHOULDER;ARM
LOCATION: SHOULDER
LOCATION: ARM
LOCATION: SHOULDER
LOCATION: SHOULDER;ARM
LOCATION: SHOULDER;ARM

## 2022-10-14 ASSESSMENT — PAIN DESCRIPTION - PAIN TYPE
TYPE: CHRONIC PAIN
TYPE: ACUTE PAIN;SURGICAL PAIN

## 2022-10-14 ASSESSMENT — PAIN DESCRIPTION - ORIENTATION
ORIENTATION: LEFT
ORIENTATION: RIGHT
ORIENTATION: LEFT
ORIENTATION: LEFT

## 2022-10-14 ASSESSMENT — PAIN DESCRIPTION - FREQUENCY: FREQUENCY: CONTINUOUS

## 2022-10-14 ASSESSMENT — PAIN DESCRIPTION - DESCRIPTORS
DESCRIPTORS: SHARP
DESCRIPTORS: ACHING;DISCOMFORT
DESCRIPTORS: ACHING
DESCRIPTORS: ACHING;DISCOMFORT
DESCRIPTORS: ACHING
DESCRIPTORS: ACHING

## 2022-10-14 ASSESSMENT — PAIN SCALES - GENERAL
PAINLEVEL_OUTOF10: 5
PAINLEVEL_OUTOF10: 4
PAINLEVEL_OUTOF10: 3
PAINLEVEL_OUTOF10: 7
PAINLEVEL_OUTOF10: 5
PAINLEVEL_OUTOF10: 7
PAINLEVEL_OUTOF10: 8
PAINLEVEL_OUTOF10: 4
PAINLEVEL_OUTOF10: 4
PAINLEVEL_OUTOF10: 6

## 2022-10-14 ASSESSMENT — PAIN DESCRIPTION - ONSET: ONSET: ON-GOING

## 2022-10-14 NOTE — PLAN OF CARE
Problem: Discharge Planning  Goal: Discharge to home or other facility with appropriate resources  Outcome: Progressing     Problem: Pain  Goal: Verbalizes/displays adequate comfort level or baseline comfort level  Outcome: Progressing     Problem: Skin/Tissue Integrity  Goal: Absence of new skin breakdown  Description: 1. Monitor for areas of redness and/or skin breakdown  2. Assess vascular access sites hourly  3. Every 4-6 hours minimum:  Change oxygen saturation probe site  4. Every 4-6 hours:  If on nasal continuous positive airway pressure, respiratory therapy assess nares and determine need for appliance change or resting period.   Outcome: Progressing     Problem: Safety - Adult  Goal: Free from fall injury  Outcome: Progressing     Problem: ABCDS Injury Assessment  Goal: Absence of physical injury  Outcome: Progressing     Problem: Chronic Conditions and Co-morbidities  Goal: Patient's chronic conditions and co-morbidity symptoms are monitored and maintained or improved  Outcome: Progressing     Problem: Neurosensory - Adult  Goal: Achieves stable or improved neurological status  Outcome: Progressing     Problem: Skin/Tissue Integrity - Adult  Goal: Skin integrity remains intact  Outcome: Progressing  Goal: Incisions, wounds, or drain sites healing without S/S of infection  Outcome: Progressing     Problem: Musculoskeletal - Adult  Goal: Return mobility to safest level of function  Outcome: Progressing  Goal: Maintain proper alignment of affected body part  Outcome: Progressing  Goal: Return ADL status to a safe level of function  Outcome: Progressing     Problem: Genitourinary - Adult  Goal: Absence of urinary retention  Outcome: Progressing

## 2022-10-14 NOTE — PROGRESS NOTES
[facility] Admission Assessment    Ethnicity  \"Are you of , /a, or Somali origin? \"  Check all that apply:  [x] A. No, not of , /a, or [place] Origin  [] B.  Yes, [place], [place] American, Chicano/a  [] C.  Yes, [address] [place] [address]  [] D.  Yes, [place]  [] E.  Yes, another , , or Somali origin  [] X. Patient unable to respond    Race  \"What is your race? \"  Check all that apply:  [x] A. White  [] B. Black or   [] C. American [place] [facility] or [place] Native  [] D.  [place] [facility]  [] E. [place]  [] F. Northern Irish  [] G. [place]  [] [place]  [] I. [place]  [] J.  Other   [] K.   [] L. Finnish or [place]  [] M. Iranian  [] N. Other [place]  [] X. Patient unable to respond    Language  A. \"What is your preferred language? \"   English    B. \"Do you need or want an  to communicate with a doctor or health care staff? \"  Check only one:  [x] 0. No  [] 1. Yes  [] 9. Unable to determine    Transportation  \"Has lack of transportation kept you from medical appointments, meetings, work, or from getting things needed for daily living? \"Check all that apply:  [] A.  Yes, it has kept me from medical appointments or from getting my medications  [] B.  Yes, it has kept me from non-medical meetings, appointments, work, or from getting things that I need  [x] C.  No  [] X. Patient unable to respond    Hearing  Ability to hear (with hearing aid or hearing appliances if normally used)  []  0. Adequate - no difficulty in normal conversation, social interaction, listening to TV  [x]  1. Minimal difficulty - difficulty in some environments (e.g. when person speaks softly or setting is noisy)  []  2. Moderate difficulty - speaker has to increase volume and speak distinctly   []  3. Highly impaired - absence of useful hearing    Vision  Ability to see in adequate light (with glasses or other visual appliances)  [x]  0.   Adequate - sees fine detail, such as regular print in newspapers/books  []  1. Impaired - sees large print, but not regular print in newspapers/books  []  2. Moderately impaired - limited vision; not able to see newspaper headlines but can identify objects  []  3. Highly impaired - object identification in question, but eyes appear to follow objects  []  4. Severely impaired - no vision or sees only light, colors, or shapes; eyes do not appear to follow objects    Health Literacy  \"How often do you need to have someone help you when you read instructions, pamphlets, or other written material from your doctor or pharmacy? \"  []  0. Never  []  1. Rarely  [x]  2. Sometimes  []  3. Often  []  4. Always  []  8. Patient unable to respond    BIMS - **Must be completed in the flowsheet at admission prior to proceeding with Delirium Assessment**  [x] BIMS completed in flowsheet at admission    Signs and Symptoms of Delirium  A. Acute Onset Mental Status Change - Is there evidence of an acute change in mental status from the patient's baseline? [x] 0. No  [] 1. Yes    B. Inattention - Did the patient have difficulty focusing attention, for example being easily distractible or having difficulty keeping track of what was being said? [x]  0. Behavior not present  []  1. Behavior continuously present, does not fluctuate  []  2. Behavior present, fluctuates (comes and goes, changes in severity)    C. Disorganized thinking - Was the patient's thinking disorganized or incoherent (rambling or irrelevant conversation, unclear or illogical flow of ideas, or unpredictable switching from subject to subject)? [x]  0. Behavior not present  []  1. Behavior continuously present, does not fluctuate  []  2. Behavior present, fluctuates (comes and goes, changes in severity)    D. Altered level of consciousness - Did the patient have altered level of consciousness as indicated by any of the following criteria?   Vigilant - startled easily to any sound or touch  Lethargic - repeatedly dozed off while being asked questions, but responded to voice or touch  Stuporous - very difficulty to arouse and keep aroused for the interview  Comatose - could not be aroused  [x]  0. Behavior not present  []  1. Behavior continuously present, does not fluctuate  []  2. Behavior present, fluctuates (comes and goes, changes in severity)    Mood    \"Over the last 2 weeks, have you been bothered by any of the following problems?\" 1. Symptom Presence    0 = No  1 = Yes  9 = No Response 2. Symptom Frequency    0 = Never or 1 day  1 = 2-6 days (several days)  2 = 7-11 days (half or more of the days)  3 = 12-14 days (nearly every day)  **Leave blank if 'No Reponse'**      Enter scores in boxes    Column 1 Column 2   Little interest or pleasure in doing things   0 0   Feeling down, depressed, or hopeless   0 0   **If either A or B in column 2 is coded 2 or 3, CONTINUE asking the questions below. If not, END the interview. **     Trouble falling or staying asleep, or sleeping too much       Feeling tired or having little energy       Poor appetite or overeating       Feeling bad about yourself - or that you are a failure or have let yourself or your family down       Trouble concentrating on things, such as reading the newspaper or watching television       Moving or speaking so slowly that other people could have noticed. Or the opposite- being so fidgety or restless that you have been moving around a lot more than usual.       Thoughts that you would be better off dead, or of hurting yourself in some way. Total Severity: Add scores for all frequency responses in column 2 (possible score 0-27, or enter 99 if unable to complete (if symptom frequency (column 2) is blank for 3 or more items). 0     Social Isolation  \"How often do you feel lonely or isolated from those around you? \"  [x] 0. Never  [] 1. Rarely  [] 2. Sometimes  [] 3. Often  [] 4. Always  [] 7. Patient declines to respond  [] 8. Patient unable to respond    Pain Effect on Sleep  \"Over the past 5 days, how much of the time has pain made it hard for you to sleep at night? \"  []  0. Does not apply - I have not had any pain or hurting in the past 5 days  []  1. Rarely or not at all  []  2. Occasionally  [x]  3. Frequently  []  4. Almost constantly  []  8. Unable to answer    **If the patient answers \"0. Does not apply\" to this question, skip the next two \"Pain Effect. Leisa Galeana \" questions**    Pain Interference with Therapy Activities  \"Over the past 5 days, how often have you limited your participation in rehabilitation therapy sessions due to pain? \"  []  0. Does not apply - I have not received rehabilitation therapy in the past 5 days  []  1. Rarely or not at all  [x]  2. Occasionally  []  3. Frequently  []  4. Almost constantly  []  8. Unable to answer    Pain Interference with Day-to-Day Activities: \"Over the past 5 days, how often have you limited your day-to-day activities (excluding rehabilitation therapy session)? \"  []  1. Rarely or not at all  []  2. Occasionally  [x]  3. Frequently  []  4. Almost constantly  []  8. Unable to answer    Nutritional Approaches  Check all of the following nutritional approaches that apply on admission:  []  A. Parenteral/IV feeding (including IV fluids if needed for hydration, but not as part of dialysis/chemo)  []  B. Feeding tube (e.g., nasogastric or abdominal (PEG))  []  C. Mechanically altered diet - requires change in texture of food or liquids (e.g., pureed food, thickened liquids)  [x]  D. Therapeutic diet (e.g., low salt, diabetic, low cholesterol)  []  Z. None of the above    High Risk Drug Classes:  Use and Indication    Is taking: Check if the pt is taking any medications by pharmacological classification, not how it is used, in the following classes  Indication noted:  If column 1 is checked, check if there is an indication noted for all meds in the drug class Is taking  (check all that apply) Indication noted (check all that apply)   Antipsychotic [] []   Anticoagulant [x] [x]   Antibiotic [] []   Opioid [x] [x]   Antiplatelet [] []   Hypoglycemic (including insulin) [] []   None of the above []     Special Treatments, Procedures, and Programs    Check all of the following treatments, procedures, and programs that apply on admission. On admission (check all that apply)   Cancer Treatments   A1. Chemotherapy []           A2. IV []           A3. Oral []           A10. Other []   B1. Radiation []   Respiratory Therapies   C1. Oxygen Therapy []           C2. Continuous (continuously for at least 14 hours per day) []           C3. Intermittent [x]           C4. High-concentration []   D1. Suctioning (Does not include oral suctioning) []           D2. Scheduled []           D3. As needed []   E1. Tracheostomy Care []   F1. Invasive Mechanical Ventilator (ventilator or respirator) []   G1. Non-invasive Mechanical Ventilator []           G2. BiPAP []           G3. CPAP []   Other   H1. IV Medications (Do not include sub Q pumps, flushes, Dextrose 50% or lactated ringers) []           H2. Vasoactive medications []           H3. Antibiotics []           H4. Anticoagulation []           H10. Other []   I1. Transfusions []   J1. Dialysis []           J2. Hemodialysis []           J3. Peritoneal dialysis []   O1. IV access (including a catheter in a vein) []           O2. Peripheral [x]           O3. Midline []           O4. Central (PICC, tunneled, port) []      None of the above (select if no Cancer, Respiratory, or Other boxes are checked) []     The above items have been reviewed and updated as necessary, and are accurate for the admission assessment period.     Reviewing RN:   Annetta Leader No, BNS, CRRN

## 2022-10-14 NOTE — PROGRESS NOTES
[facility]  10/14/2022  1:33 PM    Patient Name:   Dilan Kiser  YOB: 1934    Diagnosis: Closed displaced fracture of surgical neck of humerus, initial encounter        Subjective: Rehab consult follow-up. chart reviewed. Patient seen. Patient is able to tolerate 3 hours of therapy 5 days per week and is medically appropriate for rehab on the Acute Rehabilitation Unit. Patient has regular Medicare insurance. She is agreeable to rehab unit treatment. Patient lives at home with her daughter in a two-level house, but she does have a stair lift system in place. We will plan to admit to our rehab unit today. /82   Pulse 81   Temp 97.8 °F (36.6 °C) (Oral)   Resp 14   Ht 5' 5\" (1.651 m)   Wt 165 lb (74.8 kg)   SpO2 94%   BMI 27.46 kg/m²     Last 24 hour lab  Recent Results (from the past 24 hour(s))   Basic Metabolic Panel    Collection Time: 10/14/22  5:25 AM   Result Value Ref Range    Sodium 131 (L) 136 - 145 mmol/L    Potassium 3.7 3.5 - 5.1 mmol/L    Chloride 98 (L) 99 - 110 mmol/L    CO2 23 21 - 32 mmol/L    Anion Gap 10 3 - 16    Glucose 101 (H) 70 - 99 mg/dL    BUN 15 7 - 20 mg/dL    Creatinine <0.5 (L) 0.6 - 1.2 mg/dL    GFR Non-African American >60 >60    GFR African American >60 >60    Calcium 9.2 8.3 - 10.6 mg/dL   CBC    Collection Time: 10/14/22  5:25 AM   Result Value Ref Range    WBC 6.4 4.0 - 11.0 K/uL    RBC 3.05 (L) 4.00 - 5.20 M/uL    Hemoglobin 9.0 (L) 12.0 - 16.0 g/dL    Hematocrit 27.3 (L) 36.0 - 48.0 %    MCV 89.7 80.0 - 100.0 fL    MCH 29.5 26.0 - 34.0 pg    MCHC 32.9 31.0 - 36.0 g/dL    RDW 14.6 12.4 - 15.4 %    Platelets 826 223 - 744 K/uL    MPV 7.1 5.0 - 10.5 fL         Plan: We will plan to admit to our rehab unit today.       Dr. Bertha Tejeda

## 2022-10-14 NOTE — PROGRESS NOTES
[facility]   Progress Note    CHIEF COMPLAINT/DIAGNOSIS: S/p left reverse total Shoulder Arthroplasty    SUBJECTIVE: Patient is sitting up in bed with her daughter at bedside. Her sling is in place. She did work with therapy yesterday. They are hopeful for discharge to ARU later today if approved. She denies new issues. Patient has baseline peripheral neuropathy affecting both hands and feet. OBJECTIVE  Physical    VITALS:  /82   Pulse 81   Temp 97.8 °F (36.6 °C) (Oral)   Resp 14   Ht 5' 5\" (1.651 m)   Wt 165 lb (74.8 kg)   SpO2 94%   BMI 27.46 kg/m²     GENERAL: Alert and oriented x3, in no acute distress. MUSCULOSKELETAL: Able to flex and extend the wrist on the operative side without issue. INCISION:  Covered with post-op dressing; clean, dry and intact. ROM: left shoulder deferred. External rotation sling/immobilizer in place and well fitted. Sensory:  Intact to light touch in axillary, median radial, ulnar distributions. Vascular:   2+ radial pulses with brisk cap refill. Data    ALL MEDICATIONS HAVE BEEN REVIEWED    CBC:   Recent Labs     10/12/22  0428 10/13/22  0533 10/14/22  0525   WBC 5.1 6.6 6.4   HGB 10.4* 10.0* 9.0*   HCT 31.5* 30.1* 27.3*    172 153     BMP:   Recent Labs     10/12/22  0428 10/13/22  0533 10/14/22  0525   * 128* 131*   K 3.8 4.1 3.7   CL 92* 96* 98*   CO2 28 22 23   BUN 8 13 15   CREATININE <0.5* 0.5* <0.5*     INR:   No results for input(s): INR in the last 72 hours. Intra-Op films show stable reverse total shoulder arthroplasty with cemented humeral stem and tuberosity fixation. ASSESSMENT:  S/p left reverse total Shoulder Arthroplasty for fracture (10/12/2022), POD#2  Hyponatremia  Acute postop blood loss anemia as expected    PLAN:   - WB status:  NWB; continue sling - reviewed post op precautions. Exercises handout provided.      Keep a pillow beneath the patient's elbow to keep the forearm in front of the body (do not allow the elbow to slide backwards onto the bed). Loosen the sling 2x/day and have patient perform gentle elbow ROM, forearm rotation, and wrist ROM. NO shoulder ROM at all. Hyponatremia :salt tabs per direction of nephrology  - DVT prophylaxis: ASA 81 mg twice daily x14 days; SCDs/ankle pumps/ambulation  - OT/PT  - Pain Control: Current regimen, prescription for DC in chart due to orthopaedic surgical procedure/condition, patient may require pain medication for up to 6-8 weeks. - Expected post op acute blood loss anemia: 9/27.3  - ID: Ancef x2 completed postop  - Dispo: ARU when medically stable; ok to d/c from our end    Follow-up with Dr. Ashtyn Brown in approximately 2 weeks. Office# [phone]  No future appointments.     ADELINA Lacey - CNP  10/14/2022  9:28 AM

## 2022-10-14 NOTE — PROGRESS NOTES
AM assessment completed, VSS, pt A&Ox4, in bed, c/o pain 3/10. Dressing to left shoulder CDI, redness noted on left arm. Med administered per [facility], POC and education reviewed with pt, all needs met at this time, call light in reach, will continue to monitor. 1530: Called ARU and gave report. Pt and the daughter notified of going to ARU. Transport ordered.

## 2022-10-14 NOTE — DISCHARGE INSTR - COC
Continuity of Care Form    Patient Name: Mohan Gallagher   :  1934  MRN:  [id number]    Admit date:  10/10/2022  Discharge date:  ***    Code Status Order: Full Code   Advance Directives:     Admitting Physician:  Tobias Pa MD  PCP: Justin Oquendo MD    Discharging Nurse: [facility] Unit/Room#: 7TY-4631/0483-08  Discharging Unit Phone Number: ***    Emergency Contact:   Extended Emergency Contact Information  Primary Emergency Contact: Ladan Banco  Address: [address] [place] [address] Phone: [phone]  Mobile Phone: [phone]  Relation: Child    Past Surgical History:  Past Surgical History:   Procedure Laterality Date     SECTION      CHOLECYSTECTOMY      SHOULDER SURGERY Left 10/12/2022    LEFT REVERSE TOTAL SHOULDER ARTHROPLASTY WITH TUBEROSITY FIXATION - Carol Zepeda; REGIONAL BLOCK performed by Carie Ortiz MD at [address]       Immunization History:   Immunization History   Administered Date(s) Administered    COVID-19, MODERNA BLUE border, Primary or Immunocompromised, (age 12y+), IM, 100 mcg/0.5mL 2021, 2021    COVID-19, PFIZER PURPLE top, DILUTE for use, (age 15 y+), 30mcg/0.3mL 2021       Active Problems:  Patient Active Problem List   Diagnosis Code    Closed displaced fracture of surgical neck of humerus, initial encounter S42.213A    Osteoporosis with current pathological fracture, initial encounter M80.00XA    Hyponatremia E87.1       Isolation/Infection:   Isolation            No Isolation           Unreconciled Outside Infections       Enable clinical decision support by reconciling outside information with the patient's chart. .      Infection Onset Last Indicated Last Received Source    No mapped external infections found      .     Unmapped Infections        Carbapenem-resistant Enterobacteriaceae [facility]                   Patient Infection Status DME Other Feedings:785136013}  Liquids: {Slp liquid thickness:10607}  Daily Fluid Restriction: {CHP DME Yes amt example:500261260}  Last Modified Barium Swallow with Video (Video Swallowing Test): {Done Not Done CXDB:032692021}    Treatments at the Time of Hospital Discharge:   Respiratory Treatments: ***  Oxygen Therapy:  {Therapy; copd oxygen:76143}  Ventilator:    {[facility] Vent QEPT:442507374}    Rehab Therapies: {THERAPEUTIC INTERVENTION:9623402221}  Weight Bearing Status/Restrictions: { CC Weight Bearin}  Other Medical Equipment (for information only, NOT a DME order):  {EQUIPMENT:796336904}  Other Treatments: ***    Patient's personal belongings (please select all that are sent with patient):  {CHP DME Belongings:283883378}    RN SIGNATURE:  {Esignature:391325855}    CASE MANAGEMENT/SOCIAL WORK SECTION    Inpatient Status Date: ***    Readmission Risk Assessment Score:  Readmission Risk              Risk of Unplanned Readmission:  8           Discharging to Facility/ Agency   Name:   Address:  Phone:  Fax:    Dialysis Facility (if applicable)   Name:  Address:  Dialysis Schedule:  Phone:  Fax:    / signature: {Esignature:051428635}    PHYSICIAN SECTION    Prognosis: Fair    Condition at Discharge: Stable    Rehab Potential (if transferring to Rehab): Good    Recommended Labs or Other Treatments After Discharge:    Physician Certification: I certify the above information and transfer of Sara Shore  is necessary for the continuing treatment of the diagnosis listed and that she requires Acute Rehab for less 30 days.      Update Admission H&P: No change in H&P    PHYSICIAN SIGNATURE:  Electronically signed by Jus Gomez MD on 10/14/22 at 12:51 PM EDT

## 2022-10-14 NOTE — PLAN OF CARE
Problem: Discharge Planning  Goal: Discharge to home or other facility with appropriate resources  10/14/2022 1151 by Winnie Tate RN  Outcome: Progressing  10/14/2022 0301 by David Harmon RN  Outcome: Progressing     Problem: Pain  Goal: Verbalizes/displays adequate comfort level or baseline comfort level  10/14/2022 1151 by Winnie Tate RN  Outcome: Progressing  10/14/2022 0301 by David Harmon RN  Outcome: Progressing     Problem: Skin/Tissue Integrity  Goal: Absence of new skin breakdown  Description: 1. Monitor for areas of redness and/or skin breakdown  2. Assess vascular access sites hourly  3. Every 4-6 hours minimum:  Change oxygen saturation probe site  4. Every 4-6 hours:  If on nasal continuous positive airway pressure, respiratory therapy assess nares and determine need for appliance change or resting period.   10/14/2022 1151 by Winnie Tate RN  Outcome: Progressing  10/14/2022 0301 by David Harmon RN  Outcome: Progressing     Problem: Safety - Adult  Goal: Free from fall injury  10/14/2022 1151 by Winnie Tate RN  Outcome: Progressing  10/14/2022 0301 by David Harmon RN  Outcome: Progressing     Problem: ABCDS Injury Assessment  Goal: Absence of physical injury  10/14/2022 1151 by Winnie Tate RN  Outcome: Progressing  10/14/2022 0301 by David Harmon RN  Outcome: Progressing     Problem: Chronic Conditions and Co-morbidities  Goal: Patient's chronic conditions and co-morbidity symptoms are monitored and maintained or improved  10/14/2022 1151 by Winnie Tate RN  Outcome: Progressing  10/14/2022 0301 by David Harmon RN  Outcome: Progressing     Problem: Neurosensory - Adult  Goal: Achieves stable or improved neurological status  10/14/2022 1151 by Winnie Tate RN  Outcome: Progressing  10/14/2022 0301 by David Harmon RN  Outcome: Progressing     Problem: Skin/Tissue Integrity - Adult  Goal: Skin integrity remains intact  10/14/2022 1151 by Bernardino Lo Sussy Osman RN  Outcome: Progressing  10/14/2022 0301 by Ralf Chan RN  Outcome: Progressing  Goal: Incisions, wounds, or drain sites healing without S/S of infection  10/14/2022 1151 by Fernando Lorenzo RN  Outcome: Progressing  10/14/2022 0301 by Ralf Chan RN  Outcome: Progressing     Problem: Musculoskeletal - Adult  Goal: Return mobility to safest level of function  10/14/2022 1151 by Fernando Lorenzo RN  Outcome: Progressing  10/14/2022 0301 by Ralf Chan RN  Outcome: Progressing  Goal: Maintain proper alignment of affected body part  10/14/2022 1151 by Fernando Lorenzo RN  Outcome: Progressing  10/14/2022 0301 by Ralf Chan RN  Outcome: Progressing  Goal: Return ADL status to a safe level of function  10/14/2022 1151 by Fernando Lorenzo RN  Outcome: Progressing  10/14/2022 0301 by Ralf Chan RN  Outcome: Progressing     Problem: Genitourinary - Adult  Goal: Absence of urinary retention  10/14/2022 1151 by Fernando Lorenzo RN  Outcome: Progressing  10/14/2022 0301 by Ralf Chan RN  Outcome: Progressing

## 2022-10-14 NOTE — PROGRESS NOTES
PM assessment completed and medications given. Patient is A&O and denies any needs at this time. Standard precautions and Avasys in place.

## 2022-10-14 NOTE — PROGRESS NOTES
Physical Therapy    [facility] [address]   Phone: [phone]    Physical Therapy    [] Initial Evaluation            [x] Daily Treatment Note         [] Discharge Summary      Patient: Narciso Yuen   : 1934   MRN: [id number]   Date of Service:  10/14/2022  Admitting Diagnosis: Closed displaced fracture of surgical neck of humerus, initial encounter  Current Admission Summary: Pt in via [address]  [facility] from home. Pt states she fell walking in to her bathroom, she states her daughter tried to call her and when she didn't answer she wet to check on her and found her down. Pt complains of left shoulder pain, right hip pain. Denies LOC or hitting head. She states she takes a baby aspirin daily. Past Medical History:  has a past medical history of CHF (congestive heart failure) ([address] and Hypertension. Past Surgical History:  has a past surgical history that includes Cholecystectomy;  section; and shoulder surgery (Left, 10/12/2022). Discharge Recommendations: Narciso Yuen scored a 7/24 on the AM-PAC short mobility form. Current research shows that an AM-PAC score of 17 or less is typically not associated with a discharge to the patient's home setting. Based on the patient's AM-PAC score and their current functional mobility deficits, it is recommended that the patient have 5-7 sessions per week of Physical Therapy at d/c to increase the patient's independence. At this time, this patient demonstrates complex nursing, medical, and rehabilitative needs, and would benefit from intensive rehabilitation services upon discharge from the Inpatient setting.   This patient demonstrates the ability to participate in and benefit from an intensive therapy program with a coordinated interdisciplinary team approach to foster frequent, structured, and documented communication among disciplines, who will work together to establish, prioritize, and achieve treatment goals. Please see assessment section for further patient specific details. If patient discharges prior to next session this note will serve as a discharge summary. Please see below for the latest assessment towards goals.     DME Required For Discharge: DME to be determined at next level of care  Precautions/Restrictions: high fall risk, weight bearing  Weight Bearing Restrictions: non weight bearing  [] Right Upper Extremity  [x] Left Upper Extremity [] Right Lower Extremity  [] Left Lower Extremity     Required Braces/Orthotics: abductor sling   [] Right  [x] Left  Positional Restrictions:no PROM or AROM L shoulder movement, okay for AROM elbow/forearm, wrist and hand    Pre-Admission Information   Lives With: daughter               Type of Home: house  Home Layout: two level, 1/2 bath on main level, bedroom/bathroom upstairs, chair lift to 2nd level  Home Access:  2 step to enter without rails , has portable ramp   Bathroom Layout: tub/shower unit, walk in shower, uses WIS  [place] Equipment: grab bars in shower, shower chair, toilet raiser  Toilet Height: standard height  Home Equipment: rolling walker, manual wheelchair, reacher, oxygen only at night; purewick at nights  Transfer Assistance: Independent without use of device  Ambulation Assistance:modified independent with use of FWW in house, w/c in community  ADL Assistance: independent with all ADL's  IADL Assistance: requires assistance with all homemaking tasks, requires assistance for medication management  Active :        [] Yes                 [x] No daughter provides transportation  Hand Dominance: [] Left                 [x] Right  Hobbies: enjoys reading the paper, watching TV  Recent Falls: 1 recent fall in last 6 months prior to this fall that lead to this admission     Examination   Vision:   Vision Corrective Device: wears glasses at all times  Hearing:   hard of hearing, left hearing aid, right hearing aid  Observation:   General Observation:  L sling in place on arrival, surgical dressing c/d/i  Posture:   Kyphotic and rounded shoulders  Sensation:   reports numbness and tingling in all extremities- reports this is chronic  ROM:   (B) LE AROM WFL  Strength:   (B) LE strength grossly 3  Decision Making: medium complexity  Clinical Presentation: evolving      Subjective  General: patient supine in bed with daughter present on arrival, agreeable to OT/PT. Pt on 1L O2 resting at Spo2 96%, O2 removed. Sling on LUE present. Pain: 8/10. Location: L shoulder  Pain Interventions: Patient receiving pain medication upon arrival from RN. Functional Mobility  Bed Mobility  Supine to Sit: 2 person assist with maximum assistance of 2; assist for legs and trunk   Scooting: maximum assistance  Comments: HOB elevated, pt able to initiate BLE movement OOB  Transfers  Sit to stand transfer: 2 person assistance with maxAx2   Stand to sit transfer: 2 person assistance with maxAx2   Stand pivot transfer: 2 person assistance with maxAx2   Comments: stand pivot from EOB to recliner with RUE on arm of chair pt with significant forward trunk flexion upon standing that does not improve with verbal and tactile cueing. Ambulation  Ambulation not tested on this date secondary to weakness and balance deficits. Attempted with hemiwalker but pt becoming very fearful and unwilling to take steps. Stair Mobility  Stair mobility not completed on this date. Comments:  Wheelchair Mobility:  No w/c mobility completed on this date.   Comments:  Balance  Static Sitting Balance: fair: maintains balance at CGA without use of UE support  Dynamic Sitting Balance: fair: maintains balance at CGA without use of UE support  Static Standing Balance: poor (-): requires max (A) to maintain balance  Dynamic Standing Balance: poor (-): requires max (A) to maintain balance  Comments: Hair care and oral care performed while sitting in recliner (see OT notes)     Other Therapeutic Interventions  Gown change and UB/LB bathing; see OT notes for details. Functional Outcomes  AM-PAC Inpatient Mobility Raw Score : 7              Education  Barriers To Learning: cognition and hearing  Patient Education: patient educated on goals, PT role and benefits, plan of care, precautions, weight-bearing education, general safety, discharge recommendations  Learning Assessment:  patient verbalizes understanding, would benefit from continued reinforcement    Assessment  Activity Tolerance: Pt self limiting by extreme anxiety and pain. Impairments Requiring Therapeutic Intervention: decreased functional mobility, decreased ROM, decreased strength, decreased safety awareness, decreased cognition, decreased endurance, decreased sensation, decreased balance, increased pain, decreased posture  Prognosis: good  Clinical Assessment: Pt s/p left reverse total Shoulder Arthroplasty after fall at home. Pt dependent for all mobility on this date. Daughter is present and pt appears highly dependent on daughter for both emotional and physical support. Pt highly anxious and requiring maximum reassurance for all mobility on this date. Pt not trusting of therapists to maintain shoulder precautions. Pt was previously modified independent for household distances with use of 4WRW. Pt would continue to benefit from additional skilled PT services to facilitate return to independence.    Safety Interventions: patient left in chair, chair alarm in place, call light within reach, gait belt, patient at risk for falls, nurse notified, and family/caregiver present    Plan  Frequency: 7 x/week  Current Treatment Recommendations: strengthening, balance training, functional mobility training, transfer training, gait training, stair training, endurance training, cognitive reorientation, and pain management    Goals  Patient Goals: none stated    Short Term Goals:  Time Frame: upon discharge   Patient will complete bed mobility at supervision   Patient will complete transfers at contact guard assistance   Patient will ambulate 15 ft with use of LRAD at contact guard assistance  Patient will ascend/descend 2 stairs with (R) ascending handrail at minimal assistance  No goals met 10/14    Therapy Session Time      Individual Group Co-treatment   Time In     1122   Time Out     1200   Minutes     38     Timed Code Treatment Minutes:   38 minutes   Total Treatment Minutes:  38 minutes        Electronically Signed By: Lily Love, [address] [place], DPT [id number]

## 2022-10-14 NOTE — PROGRESS NOTES
Nephrology progress Note  [phone]  [phone]   [facility]       Patient:  Lauren Kraft   : 1934    Brief HPI    The patient is a 80 y. o.female with significant past medical history of Hyponatremia chronic, Hypertension, CHF brought in after a fall. Labs showed hyponatremia with a sodium of 126 and so we are consulted for further evaluation and management. Stated that her legs gave away and so she fell, denied any prodromal symptoms leading to the fall including chest pains/sob/palpitations/diaphoresis, denies LOC or head injury. She follows with Dr. Angela Zepeda for hyponatremia  she has been taking salt tabs but not as prescribed  No h/o  nausea/emesis or diarrhea  CT of the head and Cspine with no acute changes  Xray of the left shoulder with comminuted fracture of the left humeral neck  Seen by orthopedics and there are plans for left reverse total shoulder arthroplasty     Subjective/Interval history    Pt seen and examined  Hyponatremia better  Pain in the left shoulder      Review of Systems  Nausea occasional  No emesis    SHx:  visitors at the bed-side      Meds:  Scheduled Meds:   NIFEdipine  30 mg Oral Daily    ceFAZolin  2,000 mg IntraVENous Once    cetirizine  5 mg Oral Daily    acetaminophen  1,000 mg Oral TID    sodium chloride flush  5-40 mL IntraVENous 2 times per day     Continuous Infusions:   sodium chloride       PRN Meds:.traMADol, ketorolac, sodium chloride flush, sodium chloride, ondansetron **OR** ondansetron, polyethylene glycol, acetaminophen **OR** acetaminophen      Vitals:  /82   Pulse 81   Temp 97.8 °F (36.6 °C) (Oral)   Resp 14   Ht 5' 5\" (1.651 m)   Wt 165 lb (74.8 kg)   SpO2 94%   BMI 27.46 kg/m²       Physical Exam  General : AAOx3, not in pain or respiratory distress, resting in bed  HEENT : normocephalic, atraumatic, mucosa moist, no palor or icterus  CVS: S1 S2 normal, regular rhythm, no murmurs or rubs.   Lungs: Clear, no wheezing or crackles. Abd: Soft, bowel sounds normal, non-tender.   Ext: b/l LE edema+ left shoulder sling+  : bladder non-distended, no tenderness over the bladder  Neuro: Alert and oriented x 3, nonfocal.      Labs:  CBC with Differential:    Lab Results   Component Value Date/Time    WBC 6.4 10/14/2022 05:25 AM    RBC 3.05 10/14/2022 05:25 AM    HGB 9.0 10/14/2022 05:25 AM    HCT 27.3 10/14/2022 05:25 AM     10/14/2022 05:25 AM    MCV 89.7 10/14/2022 05:25 AM    MCH 29.5 10/14/2022 05:25 AM    MCHC 32.9 10/14/2022 05:25 AM    RDW 14.6 10/14/2022 05:25 AM    LYMPHOPCT 15.3 10/10/2022 02:00 PM    MONOPCT 4.2 10/10/2022 02:00 PM    BASOPCT 0.6 10/10/2022 02:00 PM    MONOSABS 0.4 10/10/2022 02:00 PM    LYMPHSABS 1.5 10/10/2022 02:00 PM    EOSABS 0.0 10/10/2022 02:00 PM    BASOSABS 0.1 10/10/2022 02:00 PM     BMP:    Lab Results   Component Value Date/Time     10/14/2022 05:25 AM    K 3.7 10/14/2022 05:25 AM    K 4.1 10/10/2022 02:00 PM    CL 98 10/14/2022 05:25 AM    CO2 23 10/14/2022 05:25 AM    BUN 15 10/14/2022 05:25 AM    LABALBU 3.8 10/10/2022 02:00 PM    CREATININE <0.5 10/14/2022 05:25 AM    CALCIUM 9.2 10/14/2022 05:25 AM    GFRAA >60 10/14/2022 05:25 AM    LABGLOM >60 10/14/2022 05:25 AM    GLUCOSE 101 10/14/2022 05:25 AM     Ionized Calcium:  No results found for: IONCA  Magnesium:    Lab Results   Component Value Date/Time    MG 1.80 10/10/2022 02:00 PM     Phosphorus:  No results found for: PHOS    Assessment/Plan:    Hyponatremia improving  Acute on chronic  Keep on fluid restriction  Continue salt tabs, received 7.5 mg of tolvaptan on 10/12  resume diuretics soon may be after surgery while on salt tabs, also has LE edema  Hypertension controlled  Resumed Nifedipine XL  Left shoulder fracture   S/p Left reverse shoulder arthroplasty with tuberosity fixatio 10/12  S/p Dayo Frank MD  10/14/2022  Office Phone : [phone]    Thank you for allowing us to participate in the care of this pt. I willcontinue to follow along. Please call with questions or concerns.

## 2022-10-14 NOTE — DISCHARGE SUMMARY
Hospital Medicine Discharge Summary    Patient ID: Nancy Ely      Patient's PCP: Rita Link MD    Admit Date: 10/10/2022     Discharge Date: 10/14/2022     Admitting Physician: Adali Rodriguez MD     Discharge Physician: Dasha Xavier MD        Active Hospital Problems    Diagnosis     Osteoporosis with current pathological fracture, initial encounter [M80.00XA]      Priority: Medium    Hyponatremia [E87.1]      Priority: Medium    Closed displaced fracture of surgical neck of humerus, initial encounter [S42.213A]      Priority: Medium         Hospital Course:  15-year-old female with PMHx of CHF, hypertension, chronic hyponatremia and SIADH presented after sustaining a fall while going to the bathroom. On admission, x-ray showed communicated fracture of left humeral neck with humeral shaft displaced intrarenal lesion humeral head. CT head -ve for any acute intracranial pathology. Initial diagnostic lab work showed Na:126 . Displaced left proximal humeral fracture  Orthopedic surgery consulted and patient underwent left reverse shoulder arthroplasty with tuberosity fixation on 10/12/22. Patient was discharged to ARU in stable condition     Acute on chronic hyponatremia: Na 126 on admission; trended up to 131  Patient was restarted back on sodium tabs per nephrology recs. Diuretic held. Patient also received 1 dose of tolvaptan during hospital stay     Hypertension: Continue home dose of nifedipine XL and monitor BP closely      Physical Exam Performed:     /82   Pulse 81   Temp 97.8 °F (36.6 °C) (Oral)   Resp 14   Ht 5' 5\" (1.651 m)   Wt 165 lb (74.8 kg)   SpO2 94%   BMI 27.46 kg/m²     General appearance: No apparent distress, appears stated age and cooperative. Eyes: Sclera clear. Pupils equal.  ENT: Moist oral mucosa. Trachea midline, no adenopathy. Cardiovascular: Regular rhythm, normal S1, S2. No murmur.    Respiratory: Clear to auscultation bilaterally, no wheeze or crackles. GI: Abdomen soft, no tenderness, not distended, normal bowel sounds  Musculoskeletal:  No cyanosis in digits. LLE in sling  Neurology: CN 2-12 grossly intact. No speech or motor deficits  Psych: Not agitated, appropriate affect. Skin: Warm, dry, normal turgor    Consults:     IP CONSULT TO NEPHROLOGY  IP CONSULT TO PHYSICAL MEDICINE REHAB    Disposition:  ARU    Condition at Discharge: Stable     Discharge Instructions/Follow-up:   Follow up with your PCP within 7-10 days of discharge. Follow up with nephrology as instructed   Follow up with orthopedic surgery as instructed   Take all your medications as prescribed. Discharge Medications:     Discharge Medication List as of 10/14/2022  4:43 PM             Details   traMADol (ULTRAM) 50 MG tablet Take 1 tablet by mouth every 4 hours as needed for Pain for up to 3 days. , Disp-12 tablet, R-0Print      sodium chloride 1 g tablet Take 1 tablet by mouth 3 times daily (with meals) for 14 days, Disp-42 tablet, R-0NO PRINT                Details   loratadine (CLARITIN) 10 MG tablet Take 10 mg by mouth dailyHistorical Med      magnesium oxide (MAG-OX) 400 MG tablet Take 400 mg by mouth 2 times daily Take it with mealHistorical Med      docusate sodium (COLACE) 100 MG capsule Take 100 mg by mouth 2 times dailyHistorical Med      aspirin 81 MG EC tablet Take 81 mg by mouth dailyHistorical Med      b complex vitamins capsule Take 1 capsule by mouth dailyHistorical Med      potassium chloride (MICRO-K) 10 MEQ extended release capsule Take 10 mEq by mouth dailyHistorical Med      NIFEdipine (ADALAT CC) 30 MG extended release tablet Take 30 mg by mouth dailyHistorical Med      lidocaine (LIDODERM) 5 % Place 1 patch onto the skin daily 12 hours on, 12 hours off. Historical Med           The patient was seen and examined on day of discharge and this discharge summary is in conjunction with any daily progress note from day of discharge. Time Spent on discharge is 45 minutes  in the examination, evaluation, counseling and review of medications and discharge plan. Note that greater  than 30 minutes was spent in preparing discharge papers, discussing discharge with patient, medication review, etc.     Signed:    Nadya Moore MD   10/14/2022      Thank you Karolina Sarmiento MD for the opportunity to be involved in this patient's care. If you have any questions or concerns please feel free to contact me at [phone].

## 2022-10-14 NOTE — PLAN OF CARE
ARU PATIENT TREATMENT PLAN  [facility]  [address] [facility], [address] [place] [address]      Helena Morales    : 1934  Acct #: [de-identified]  MRN: [id number]  PHYSICIAN:  Bam Meeks MD  Primary Problem    Patient Active Problem List   Diagnosis    Closed displaced fracture of surgical neck of humerus, initial encounter    Osteoporosis with current pathological fracture, initial encounter    Hyponatremia    Left supracondylar humerus fracture, sequela       Rehabilitation Diagnosis:      Displaced left proximal humeral fracture- Left reverse shoulder arthroplasty with tuberosity fixation on 10/12/22,Dr Clark,[facility] LUE, continue sling. Acute on chronic hyponatremia-Nephrology on board,restarted back on salt tabs. Hold diuretics and continue fluid resuscitation     Hypertension: nifedipine XL      Bowels: Schedule Miralax + Senna S. Follow bowel movements. Enema or suppository if needed. Bladder: Check PVR x 3. [address] if PVR > 200ml or if any volume is > 500 ml. Pain: Tramadol is ordered prn. ADMIT DATE:10/14/2022    Patient Goals: To go home and be independent. Admitting Impairments: Orthopedic Disorder - 8.9 - Other Orthopedic  Activities: Impaired Eating, Hygiene, Toileting, Bathing, Dressing, Bed Mobility, Transfers, Ambulation, Stairs, and Endurance. Participation: Prior to admission patient was living at home with daughter, was independent with ambulation with a RW, transfers, and ADLs, was not an active .      CARE PLAN     NURSING:  Helena Morales while on this unit will:  [] Be continent of bowel and bladder     [x] Have an adequate number of bowel movements  [] Urinate with no urinary retention >300ml in bladder  [] Complete bladder protocol with de souza removal  [] Maintain O2 SATs at ___%  [] Have pain managed while on ARU       [] Be pain free by discharge   [x] Have no skin breakdown while on ARU  [] Have improved skin integrity via wound measurements  [] Have no signs/symptoms of infection at the wound site  [x] Be free from injury during hospitalization   [] Complete education with patient/family with understanding demonstrated for:  [] Adjustment   [] Other:     Nursing Interventions will include:  [] bowel/bladder training   [] education for medical assistive devices   [] medication education   [] O2 saturation management   [] energy conservation   [] stress management techniques   [x] fall prevention   [] alarms protocol   [] seating and positioning   [] skin/wound care   [x] pressure relief instruction   [] dressing changes     [] infection protection   [] DVT prophylaxis  [x] assistance with in room safety with transfers to bed, toilet, wheelchair, shower   [] bathroom activities and hygiene  [] Other:    Patient/Caregiver Education for:  [x] Disease/sustained injury/management     [] Medication Use  [] Surgical intervention  [x] Safety  [] Body mechanics and or joint protection  [x] Health maintenance     [] Other:     PHYSICAL THERAPY:  Goals:                   Patient Goals: to go home and be independent    Short Term Goals:  Time Frame: 2 weeks   Patient will complete supine<>sit transfer at moderate assistance   Patient will complete lateral transfer at moderate assistance   Patient will complete manual w/c propulsion 10 ft at moderate assistance     To be met in 3 weeks:  Patient will complete supine<>sit transfer at min A I  Patient will complete stand step transfer at min A  Patient will complete manual w/c propulsion 100 ft at min A  Patient will tolerate ambulation x 10' with use of LRAD with mod A                These goals were reviewed with this patient at the time of assessment and [place] Staff is in agreement.      Plan of Care: Pt to be seen 5 out of 7 days per week per ARU protocol ( 90 minutes with PT)                     OCCUPATIONAL THERAPY:  Goals:             Short Term Goals:  Time Frame: 3 weeks  Patient will complete upper body ADL at minimal assistance   Patient will complete lower body ADL at minimal assistance   Patient will complete toileting at modified independent   Patient will complete self-feeding at Independent   Patient will complete grooming at Independent   Patient will complete functional transfers at modified independent   Patient will complete functional mobility at modified independent   These goals were reviewed with this patient at the time of assessment and Sara Shore is in agreement    Plan of Care:  Pt to be seen 5 out of 7 days per week per ARU protocol ( 90 minutes with OT)    Therapy Treatments will include:  [x]  therapeutic exercises    [x]  gait training     []  neuromuscular re-ed                            [x]  transfer training             [x] community reintegration    [x] bed mobility                          [x]  w/c mobility and training  [x]  self care    []home mgmt    []  cognitive training            []  energy conservation        []  dysphagia tx    []  speech/language/communication therapy   []  group therapy    [x]  patient/family education    [] Other:    CASE MANAGEMENT:  Goals:   Assist patient/family with discharge planning, patient/family counseling, and coordination with insurance during ARU stay. Sara Shore will be seen a minimum of 3 hours of therapy per day, a minimum of 5 out of 7 days per week  (please see above for specific treatment plan per PT/OT/SLP). [] In this rare instance due to the nature of this patient's medical involvement, this patient will be seen 15 hours per week (900 minutes within a 7 day period). In addition, dietician/nutritionist may monitor calorie count as well as intake and collaboratively work with SLP on dietary upgrades. Neuropsychology/Psychology may evaluate and provide necessary support.     Medical issues being managed closely and that require 24 hour availability of a physician:  [] Swallowing Precautions  [x] Bowel/Bladder Fx  [x] Weight bearing precautions  [] Wound Care    [x] Pain Mgmt   [x] Infection Protection  [x] DVT Prophylaxis   [x] Fall Precautions  [x] Fluid/Electrolyte/Nutrition Balance  [] Voice Protection   [x] Respiratory  [] Other:    Medical Prognosis: [] Good  [x] Fair    [] Guarded   Total expected IRF days: 21  Anticipated discharge destination: Home  [] Home Independently   [] Home with supervision    []SNF     [x] Other - Home with 24/7 supv/assist                                           Physician anticipated functional outcomes:  By discharge, patient will progress to being Min-Mod A for ADLs and mobility, independent for eating and grooming. IPOC brief synthesis: 80-year-old female with PMHx of CHF, hypertension, chronic hyponatremia and SIADH presented after sustaining a fall while going to the bathroom. Patient reported pain in her left shoulder and right hip. X-ray revealed a communicated fracture of left humeral neck with humeral shaft displaced. CT head negative for any acute intracranial pathology. Patient taken to surgery by Dr. Yesy Conner on 10/12 for a repair of her Left comminuted and displaced proximal humerus fracture with a Left reverse shoulder arthroplasty with tuberosity fixation. Post op, patient started in therapies, and patient is able to tolerate 3 hours of therapy 5 days per week and is medically appropriate for rehab on the Acute Rehabilitation Unit. Patient has regular Medicare insurance. She is agreeable to rehab unit treatment. Patient lives at home with her daughter in a two-level house, but she does have a stair lift system in place.         I have reviewed this initial plan of care and agree with its contents:    Title   Name    Date    Time    Physician: Electronically signed by Yamilka Gore MD on 10/17/2022 at 1:46 PM      Case Mgmt: Arpita Pahceco MSW, LSW, 10/17/2022 @ 12:12    OT: Keith Escalona, [address] [facility], OTR/L [id number], 10/15/22, 1321    PT: Yolanda Degroot PT, DPT [id number], 10/15/22, 12:22    RN: Electronically signed by Shaina Castillo RN on 10/14/2022 at 7:00 PM     :  Svetlana Arrington PT, DPT [id number]  10/17/22  12:18 PM

## 2022-10-15 LAB
ANION GAP SERPL CALCULATED.3IONS-SCNC: 8 MMOL/L (ref 3–16)
BUN BLDV-MCNC: 14 MG/DL (ref 7–20)
CALCIUM SERPL-MCNC: 8.9 MG/DL (ref 8.3–10.6)
CHLORIDE BLD-SCNC: 97 MMOL/L (ref 99–110)
CO2: 24 MMOL/L (ref 21–32)
CREAT SERPL-MCNC: <0.5 MG/DL (ref 0.6–1.2)
GFR AFRICAN AMERICAN: >60
GFR NON-AFRICAN AMERICAN: >60
GLUCOSE BLD-MCNC: 106 MG/DL (ref 70–99)
MAGNESIUM: 2 MG/DL (ref 1.8–2.4)
POTASSIUM SERPL-SCNC: 3.8 MMOL/L (ref 3.5–5.1)
SODIUM BLD-SCNC: 129 MMOL/L (ref 136–145)

## 2022-10-15 PROCEDURE — 97167 OT EVAL HIGH COMPLEX 60 MIN: CPT

## 2022-10-15 PROCEDURE — 2580000003 HC RX 258: Performed by: PHYSICAL MEDICINE & REHABILITATION

## 2022-10-15 PROCEDURE — 83735 ASSAY OF MAGNESIUM: CPT

## 2022-10-15 PROCEDURE — 80048 BASIC METABOLIC PNL TOTAL CA: CPT

## 2022-10-15 PROCEDURE — 6360000002 HC RX W HCPCS: Performed by: PHYSICAL MEDICINE & REHABILITATION

## 2022-10-15 PROCEDURE — 6370000000 HC RX 637 (ALT 250 FOR IP): Performed by: PHYSICAL MEDICINE & REHABILITATION

## 2022-10-15 PROCEDURE — 97530 THERAPEUTIC ACTIVITIES: CPT

## 2022-10-15 PROCEDURE — 6370000000 HC RX 637 (ALT 250 FOR IP): Performed by: INTERNAL MEDICINE

## 2022-10-15 PROCEDURE — 97535 SELF CARE MNGMENT TRAINING: CPT

## 2022-10-15 PROCEDURE — 1280000000 HC REHAB R&B

## 2022-10-15 PROCEDURE — 97163 PT EVAL HIGH COMPLEX 45 MIN: CPT

## 2022-10-15 RX ORDER — SODIUM CHLORIDE 1000 MG
1 TABLET, SOLUBLE MISCELLANEOUS 2 TIMES DAILY WITH MEALS
Status: DISCONTINUED | OUTPATIENT
Start: 2022-10-15 | End: 2022-10-19

## 2022-10-15 RX ADMIN — ACETAMINOPHEN 1000 MG: 500 TABLET ORAL at 10:22

## 2022-10-15 RX ADMIN — NIFEDIPINE 30 MG: 30 TABLET, EXTENDED RELEASE ORAL at 10:23

## 2022-10-15 RX ADMIN — SODIUM CHLORIDE TAB 1 GM 1 G: 1 TAB at 10:22

## 2022-10-15 RX ADMIN — Medication 10 ML: at 10:23

## 2022-10-15 RX ADMIN — SODIUM CHLORIDE TAB 1 GM 1 G: 1 TAB at 16:45

## 2022-10-15 RX ADMIN — TRAMADOL HYDROCHLORIDE 50 MG: 50 TABLET ORAL at 20:55

## 2022-10-15 RX ADMIN — ACETAMINOPHEN 1000 MG: 500 TABLET ORAL at 14:30

## 2022-10-15 RX ADMIN — TRAMADOL HYDROCHLORIDE 50 MG: 50 TABLET ORAL at 23:32

## 2022-10-15 RX ADMIN — TRAMADOL HYDROCHLORIDE 50 MG: 50 TABLET ORAL at 10:23

## 2022-10-15 RX ADMIN — ENOXAPARIN SODIUM 40 MG: 100 INJECTION SUBCUTANEOUS at 10:22

## 2022-10-15 RX ADMIN — TRAMADOL HYDROCHLORIDE 50 MG: 50 TABLET ORAL at 16:56

## 2022-10-15 RX ADMIN — CETIRIZINE HYDROCHLORIDE 5 MG: 10 TABLET, FILM COATED ORAL at 10:23

## 2022-10-15 ASSESSMENT — PAIN SCALES - GENERAL
PAINLEVEL_OUTOF10: 6
PAINLEVEL_OUTOF10: 5
PAINLEVEL_OUTOF10: 8
PAINLEVEL_OUTOF10: 4
PAINLEVEL_OUTOF10: 4
PAINLEVEL_OUTOF10: 5
PAINLEVEL_OUTOF10: 5
PAINLEVEL_OUTOF10: 6

## 2022-10-15 ASSESSMENT — PAIN DESCRIPTION - LOCATION
LOCATION: SHOULDER
LOCATION: SHOULDER
LOCATION: FINGER (COMMENT WHICH ONE)
LOCATION: SHOULDER
LOCATION: SHOULDER

## 2022-10-15 ASSESSMENT — PAIN DESCRIPTION - ORIENTATION
ORIENTATION: LEFT
ORIENTATION: RIGHT
ORIENTATION: LEFT
ORIENTATION: LEFT

## 2022-10-15 ASSESSMENT — PAIN - FUNCTIONAL ASSESSMENT
PAIN_FUNCTIONAL_ASSESSMENT: PREVENTS OR INTERFERES SOME ACTIVE ACTIVITIES AND ADLS
PAIN_FUNCTIONAL_ASSESSMENT: PREVENTS OR INTERFERES SOME ACTIVE ACTIVITIES AND ADLS

## 2022-10-15 ASSESSMENT — PAIN DESCRIPTION - PAIN TYPE
TYPE: SURGICAL PAIN
TYPE: SURGICAL PAIN

## 2022-10-15 ASSESSMENT — PAIN DESCRIPTION - ONSET: ONSET: ON-GOING

## 2022-10-15 ASSESSMENT — PAIN DESCRIPTION - DESCRIPTORS
DESCRIPTORS: ACHING;DISCOMFORT;TIGHTNESS
DESCRIPTORS: ACHING;DISCOMFORT
DESCRIPTORS: ACHING;SPASM

## 2022-10-15 ASSESSMENT — PAIN DESCRIPTION - FREQUENCY
FREQUENCY: CONTINUOUS
FREQUENCY: CONTINUOUS

## 2022-10-15 NOTE — PROGRESS NOTES
Nutrition Note    RECOMMENDATIONS  PO Diet: Continue current diet  ONS: Pt denied    NUTRITION ASSESSMENT   Pt triggered for new ARU admission. S/p left reverse total shoulder arthroplasty 10/12. On regular diet with 1200 mL fluid restriction, documented intakes of % x2 yesterday. Upon visiting, pt reported no appetite or po intake issues both now or prior to current admission, eating about half of meals. Reported no recent wt loss but some gain. Denied need for ONS as they cause GI upset. Family member present in room reported making sure to consume protein at meals to promote wound healing s/p surgery. RD will continue to monitor for adequate po intake. Nutrition Related Findings: Na 129, Cl 97. LBM 10/15, BS+. Non-pitting generalized, BUE; +3 RLE; +2 LLE edema. Wounds: Surgical Incision  Nutrition Education:  Education not indicated   Nutrition Goals: PO intake 50% or greater, by next RD assessment     MALNUTRITION ASSESSMENT   Malnutrition Status: No malnutrition    NUTRITION DIAGNOSIS   Increased nutrient needs related to increase demand for energy/nutrients as evidenced by other (comment) (s/p shoulder arthroplasty)    CURRENT NUTRITION THERAPIES  ADULT DIET; Regular; 1500 ml     PO Intake: %   PO Supplement Intake:None Ordered    ANTHROPOMETRICS  Current Height: 5' 5\" (165.1 cm)  Current Weight: 163 lb (73.9 kg)    Admission weight: 163 lb (73.9 kg) (bed wt)  Ideal Body Weight (IBW): 125 lbs  (57 kg)        BMI: 27.1    The patient will be monitored per nutrition standards of care. Consult dietitian if additional nutrition interventions are needed prior to RD reassessment. Jin Brand, MS, RD, LD    Weekend Contact: [phone]. Please leave name and callback number.

## 2022-10-15 NOTE — PROGRESS NOTES
[facility]   Phone: [phone]    Occupational Therapy    [x] Initial Evaluation            [] Daily Treatment Note         [] Discharge Summary      Patient: Ethelyn Schaumann   : 1934   MRN: [id number]   Date of Service:  10/15/2022    Admitting Diagnosis:  Left supracondylar humerus fracture, sequela  Current Admission Summary: 80 y.o. female who presented to [facility] ER today with complaints of left shoulder pain after she fell in her bathroom. C TL shoulder showed A displaced fracture of the surgical neck involving the greater tuberosity s/p L RSA 10/12/22  Past Medical History:  has a past medical history of CHF (congestive heart failure) ([address] and Hypertension. Past Surgical History:  has a past surgical history that includes Cholecystectomy;  section; and shoulder surgery (Left, 10/12/2022).     Discharge Recommendations: Home with 24 hour supervision and [facility] OT    DME Required For Discharge:  may need hip kit for LB ADLs    Precautions/Restrictions: high fall risk, weight bearing, ROM restrictions  Weight Bearing Restrictions: non weight bearing  [] Right Upper Extremity  [x] Left Upper Extremity [] Right Lower Extremity  [] Left Lower Extremity     Required Braces/Orthotics:  abductor sling   [] Right  [x] Left  Positional Restrictions: no PROM or AROM L shoulder movement, okay for AROM elbow/forearm, wrist and hand    Lives With: daughter               Type of Home: house  Home Layout: two level, 1/2 bath on main level, bedroom/bathroom upstairs, chair lift to 2nd level  Home Access:  2 step to enter without rails , has portable ramp   Bathroom Layout: tub/shower unit, walk in shower, uses WIS  Bathroom Equipment: grab bars in shower, shower chair, toilet raiser  Toilet Height: standard height  Home Equipment: rolling walker, manual wheelchair, reacher, oxygen only at night; purewick at nights  Transfer Assistance: Independent without use of device  Ambulation Assistance:modified independent with use of FWW in house, w/c in community  ADL Assistance: independent with all ADL's  IADL Assistance: requires assistance with all homemaking tasks, requires assistance for medication management  Active :        [] Yes                 [x] No daughter provides transportation  Hand Dominance: [] Left                 [x] Right  Hobbies: enjoys reading the paper, watching TV  Recent Falls: 1 recent fall in last 6 months prior to this fall that lead to this admission    Examination   Vision:   Vision Corrective Device: wears glasses at all times  Hearing:   hard of hearing, left hearing aid, right hearing aid  Observation:   Edema: Edema located in (B) LE  Incision/Scar: L sling in place on arrival, surgical dressing c/d/i  Posture:   Kyphotic and rounded shoulders  Sensation:   Reports chronic numbness/tingling in all extremities  ROM:   Not formally tested but B shoulder ROM appeared limited d/t arthritis  Strength:   Overall weakness: 3+/5    Decision Making: high complexity  Clinical Presentation: stable      Subjective  General: Pt supine in bed upon arival, agreeable to OT/PT eval. Reports diarrhea overnight. Pain: 7/10. Location: R shoulder  Pain Interventions: pain medication in place prior to arrival        Activities of Daily Living  Basic Activities of Daily Living  Upper Extremity Bathing: moderate assistance  Lower Extremity Bathing: dependent   Bathing Equipment: none  Bathing Comments: pt self limiting and also limited by pain; pt able to wash chest then states, \"I'm too tired. My arms are really bothering me from arthritis. I can't do anymore. I need help. \"  Upper Extremity Dressing: dependent  Lower Extremity Dressing: dependent  Dressing Equipment: none  Dressing Comments: Cues for motivation for pt to attempt but pt reports she needs help and is unable to do any dressing  Toileting: dependent.     Toileting Comments: successful BM w/ increased time d/t laxatives  General Comments: Pt performed sponge bath seated on toilet while having bowel movement. Pt self limiting, and limited by pain and arthritis. Pt w/ significant anterior and L lateral lean while seated on commode and w/ transfers as session progressed. Pt unable to self correct and required max A to maintain midline. Correctly repositioned sling   Instrumental Activities of Daily Living  No IADL completed on this date. Functional Mobility  Bed Mobility  Supine to Sit: 2 person assistance with max A of 2   Comments:  Transfers  Sit to stand transfer:2 person assistance with max A of 2   Stand to sit transfer: 2 person assistance with max A of 2   Stand pivot transfer: 2 person assistance with max A of 2   Toilet transfer: 2 person assistance with max A of 2   Comments: pt limited by pain and fixated on arthritis  Functional Mobility:  Sitting Balance: stand by assistance, maximum assistance. Sitting Balance Comment: progressed from SBA-max A as session progressed and pt sat on unsupported surfaces  Standing Balance: 2 person assistance with max A of 2 . Functional Mobility Comment: Pt unable to ambulate this date    Other Therapeutic Interventions    Functional Outcomes     ADDENDUM SECOND THERAPY SESSION [id number]  Pt seated in recliner upon entry with shoulder immobilizer on (askew and pulling on neck) - extended time to adjust EOB with assist of 2 - researching for instructions and manufacturers web site didn't include instructions. With team work (assist of 1 to support LUE), immobilizer adjusted for safety and comfort. Pt agreeable to therapy session. Max A x 2 to sit pivot to wc. Pt Dependent wc mobility to bathroom - pt with oral care (SBA/heavy setup). Pt bumped right hand on sink and knocked IV loose and it was bleeding, RN contacted and removed IV. Pt Max A x 2 to sit pivot to EOB. Pt Dependent x 2 sit to supine.  Brief change Dependent with rolling right Max A x 2. Pt's daughter present and very involved in pt's care. Call light in reach and bed alarm on. Cognition  Overall Cognitive Status: Impaired  Following Commands: follows one step commands with increased time  Attention Span: attends with cues to redirect  Safety Judgement: decreased awareness of need for assistance, decreased awareness of need for safety  Insights: decreased awareness of deficits  Orientation:    alert and oriented x 4  Command Following:   accurately follows one step commands     Education  Barriers To Learning: cognition and hearing  Patient Education: patient educated on goals, OT role and benefits, plan of care, precautions, weight-bearing education, family education, transfer training, discharge recommendations  Learning Assessment:  patient verbalizes understanding, would benefit from continued reinforcement    Assessment  Activity Tolerance: limited by pain, co morbidities, and behavior  Impairments Requiring Therapeutic Intervention: decreased functional mobility, decreased ADL status, decreased safety awareness, decreased cognition, decreased endurance, decreased balance, increased pain, decreased posture  Prognosis: fair  Clinical Assessment: Pt is not at baseline level of function d/t above deficits, impacting daily occupational performance. Pt very limited by R shoulder pain. Pt also demo's self limiting behaviors and c/o difficulty performing transfers and sitting upright d/t arthritis. Required significant time for all tasks d/t anxiety and pain. Pt will benefit from skilled OT to maximize safety and occupational performance.   Safety Interventions: patient left in chair, chair alarm in place, call light within reach, gait belt, patient at risk for falls, nurse notified, and family/caregiver present    Plan  Frequency: 5 x/week, 90 min/day  Current Treatment Recommendations: strengthening, balance training, functional mobility training, transfer training, endurance training, patient/caregiver education, ADL/self-care training, IADL training, home management training, pain management, and safety education    Goals  Patient Goals: Go home   Short Term Goals:  Time Frame: 3 weeks  Patient will complete upper body ADL at minimal assistance   Patient will complete lower body ADL at minimal assistance   Patient will complete toileting at modified independent   Patient will complete self-feeding at Independent   Patient will complete grooming at Independent   Patient will complete functional transfers at modified independent   Patient will complete functional mobility at modified independent     Therapy Session Time     Individual Group Co-treatment   Time In     0730   Time Out     0845   Minutes    75        Timed Code Treatment Minutes:   60  Total Treatment Minutes:  75    Second Session Therapy Time:   Individual Concurrent Group Co-treatment   Time In       1315   Time Out       1410   Minutes       55     Timed Code Treatment Minutes: 55 minutes     Total Treatment Minutes:   55 minutes       Electronically Signed By: Catrachito Velez, [address] [facility], [address] [place] [address], OTR/L HERNESTO [address], [address] (Treatment and documentation for 2nd therapy session)    I have reviewed and agree with the above treatment note.     Stella Moreno, OTR/L, [id number]

## 2022-10-15 NOTE — H&P
Department of [facility] History [facility] Physical      Patient Identification:  Bladimir Garcia  : 1934  Admit date: 10/14/2022  Dictation date: 10/15/22   Attending provider: Michael Lee MD        Primary care provider: William Piedra MD       Chief Complaint:   Patient Active Problem List   Diagnosis    Closed displaced fracture of surgical neck of humerus, initial encounter    Osteoporosis with current pathological fracture, initial encounter    Hyponatremia    Left supracondylar humerus fracture, sequela       History of Present Illness/Hospital Course:   66-year-old female with PMHx of CHF, hypertension, chronic hyponatremia and SIADH presented after sustaining a fall while going to the bathroom. Patient reported pain in her left shoulder and right hip. X-ray revealed a communicated fracture of left humeral neck with humeral shaft displaced. CT head negative for any acute intracranial pathology. Patient taken to surgery by Dr. Namrata Love on 10/12 for a repair of her Left comminuted and displaced proximal humerus fracture with a Left reverse shoulder arthroplasty with tuberosity fixation. Post op, patient started in therapies, and patient is able to tolerate 3 hours of therapy 5 days per week and is medically appropriate for rehab on the Acute Rehabilitation Unit. Patient has regular Medicare insurance. She is agreeable to rehab unit treatment. Patient lives at home with her daughter in a two-level house, but she does have a stair lift system in place. Prior Level of Function:  Independent in self care and ADLs prior to admission.       Current Level of Function:  PT:  Bed Mobility  Supine to Sit: 2 person assist with maximum assistance of 2; assist for legs and trunk   Scooting: maximum assistance  Comments: HOB elevated, pt able to initiate BLE movement OOB  Transfers  Sit to stand transfer: 2 person assistance with maxAx2   Stand to sit transfer: 2 person assistance with maxAx2   Stand pivot transfer: 2 person assistance with maxAx2   Comments: stand pivot from EOB to recliner with RUE on arm of chair pt with significant forward trunk flexion upon standing that does not improve with verbal and tactile cueing. Ambulation  Ambulation not tested on this date secondary to weakness and balance deficits. Attempted with hemiwalker but pt becoming very fearful and unwilling to take steps. OT:                     Basic Activities of Daily Living  Grooming: setup assistance  Grooming Comments: Face wash seated EOB  Upper Extremity Bathing: maximum assistance  Lower Extremity Bathing: dependent Comment: self-limiting behaviors, anticipate increased independence with decreased anxiety   Bathing Comments: Sponge bath seated EOB, pt anxious throughout with fear of falling. Upper Extremity Dressing: maximum assistance  Dressing Comments: Gown chg seated EOB, L UE brace remained on throughout          has a past medical history of CHF (congestive heart failure) ([address] and Hypertension. reports that she has never smoked. She has never used smokeless tobacco. She reports that she does not currently use alcohol. She reports that she does not use drugs. family history is not on file. Prior to Admission medications    Medication Sig Start Date End Date Taking? Authorizing Provider   traMADol (ULTRAM) 50 MG tablet Take 1 tablet by mouth every 4 hours as needed for Pain for up to 3 days.  10/14/22 10/17/22  ADELINA Lee - CNP   sodium chloride 1 g tablet Take 1 tablet by mouth 3 times daily (with meals) for 14 days 10/14/22 10/28/22  Charline Stark MD   loratadine (CLARITIN) 10 MG tablet Take 10 mg by mouth daily 6/11/20   Historical Provider, MD   magnesium oxide (MAG-OX) 400 MG tablet Take 400 mg by mouth 2 times daily Take it with meal    Historical Provider, MD   docusate sodium (COLACE) 100 MG capsule Take 100 mg by mouth 2 times daily    Historical Provider, MD   aspirin 81 MG EC tablet Take 81 mg by mouth daily    Historical Provider, MD   b complex vitamins capsule Take 1 capsule by mouth daily    Historical Provider, MD   potassium chloride (MICRO-K) 10 MEQ extended release capsule Take 10 mEq by mouth daily    Historical Provider, MD   NIFEdipine (ADALAT CC) 30 MG extended release tablet Take 30 mg by mouth daily    Historical Provider, MD   lidocaine (LIDODERM) 5 % Place 1 patch onto the skin daily 12 hours on, 12 hours off. Historical Provider, MD         REVIEW OF SYSTEMS:   CONSTITUTIONAL: negative for fevers, chills, diaphoresis, activity change, appetite change, fatigue, night sweats and unexpected weight change. EYES: negative for blurred vision, eye discharge, visual disturbance and icterus. HEENT: negative for hearing loss, tinnitus, ear drainage, sinus pressure, nasal congestion, epistaxis and snoring. RESPIRATORY: Negative for hemoptysis, cough, sputum production. CARDIOVASCULAR: negative for chest pain, palpitations, exertional chest pressure/discomfort, edema, syncope. + CHF  GASTROINTESTINAL: negative for nausea, vomiting, diarrhea, constipation, blood in stool and abdominal pain. GENITOURINARY: negative for frequency, dysuria, urinary incontinence, decreased urine volume, and hematuria. + Hyponatremia  HEMATOLOGIC/LYMPHATIC: negative for easy bruising, bleeding and lymphadenopathy. ALLERGIC/IMMUNOLOGIC: negative for recurrent infections, angioedema, anaphylaxis and drug reactions. ENDOCRINE: negative for weight changes and diabetic symptoms including polyuria, polydipsia and polyphagia. MUSCULOSKELETAL: negative for pain, joint swelling, decreased range of motion and muscle weakness. NEUROLOGICAL: negative for headaches, slurred speech, unilateral weakness. PSYCHIATRIC/BEHAVIORAL: negative for hallucinations, behavioral problems, confusion and agitation. All pertinent positives are noted in the HPI.     Physical Examination:  Vitals: Patient Vitals for the past 24 hrs:   BP Temp Temp src Pulse Resp SpO2 Height Weight   10/14/22 2000 136/67 97.4 °F (36.3 °C) Oral 75 16 93 % -- --   10/14/22 1956 -- -- -- -- 16 -- -- --   10/14/22 1748 (!) 143/91 97.6 °F (36.4 °C) Oral 95 16 93 % 5' 5\" (1.651 m) 163 lb (73.9 kg)     Psych: Stable mood, normal judgement, normal affect   Const: No distress  Eyes: Conjunctiva noninjected, no icterus noted; pupils equal, round, and reactive to light. HENT: Atraumatic, normocephalic; Oral mucosa moist  Neck: Trachea midline, neck supple. No thyromegaly noted. CV: Regular rate and rhythm, no murmur rub or gallop noted  Resp: Lungs clear to auscultation bilaterally, no rales wheezes or ronchi, no retractions. Respirations unlabored. GI: Soft, nontender, nondistended. Normal bowel sounds. No palpable masses. Neuro: Alert, oriented, appropriate. No cranial nerve deficits appreciated. Motor examination reveals normal strength in 3 limbs diffusely, left arm not tested  Skin: Normal temperature and turgor  MSK: Left  arm in sling, NWB through arm     Ext: No significant edema appreciated. No varicosities. Lab Results   Component Value Date    WBC 6.4 10/14/2022    HGB 9.0 (L) 10/14/2022    HCT 27.3 (L) 10/14/2022    MCV 89.7 10/14/2022     10/14/2022     Lab Results   Component Value Date    INR 0.93 10/10/2022    PROTIME 12.4 10/10/2022     Lab Results   Component Value Date    CREATININE <0.5 (L) 10/15/2022    BUN 14 10/15/2022     (L) 10/15/2022    K 3.8 10/15/2022    CL 97 (L) 10/15/2022    CO2 24 10/15/2022     Lab Results   Component Value Date    ALT 11 10/10/2022    AST 16 10/10/2022    ALKPHOS 69 10/10/2022    BILITOT 0.4 10/10/2022       XR SHOULDER LEFT 1 VW    Result Date: 10/12/2022  Radiology exam is complete. No Radiologist dictation. Please follow up with ordering provider.      CT Head W/O Contrast    Result Date: 10/10/2022  EXAM: CT Head Without Intravenous Contrast EXAM DATE/TIME: 10/10/2022 1:38 pm CLINICAL HISTORY: ORDERING SYSTEM PROVIDED  trauma  TECHNOLOGIST PROVIDED HISTORY:  Reason for exam:->trauma  Has a \"code stroke\" or \"stroke alert\" been called? ->No Decision Support Exception - unselect if not a suspected or confirmed emergency medical condition->Emergency Medical Condition (MA)  Reason for Exam: trauma TECHNIQUE: Axial computed tomography images of the head/brain without intravenous contrast.  This CT exam was performed using one or more of the following dose reduction techniques:  automated exposure control, adjustment of the mA and/or kV according to patient size, and/or use of iterative reconstruction technique. COMPARISON: No relevant prior studies available. FINDINGS: Brain:  No evidence of acute intracranial process. No acute ischemia. No mass or mass effect. No acute intracranial hemorrhage. Scattered periventricular deep white matter hypodensity consistent with small vessel ischemic deep white matter disease. Ventricles:  No acute findings. No ventriculomegaly. Bones/joints:  No acute findings. No acute fracture. Soft tissues:  No acute findings. Sinuses:  Chronic sinusitis versus mucous retention cysts within both sphenoid sinuses. Mastoid air cells:  Unremarkable as visualized. No mastoid effusion. 1.  No evidence of acute intracranial process. 2.  Findings of presumed small vessel ischemic deep white matter disease. 3.  Chronic sinusitis versus mucous retention cysts within both sphenoid sinuses.      CT CSpine W/O Contrast    Result Date: 10/10/2022  EXAM: CT Cervical Spine Without Intravenous Contrast EXAM DATE/TIME: 10/10/2022 1:38 pm CLINICAL HISTORY: ORDERING SYSTEM PROVIDED  trauma  TECHNOLOGIST PROVIDED HISTORY:  Reason for exam:->trauma  Decision Support Exception - unselect if not a suspected or confirmed emergency medical condition->Emergency Medical Condition (MA) Reason for Exam: trauma TECHNIQUE: Axial computed tomography images of the cervical spine without intravenous contrast.  This CT exam was performed using one or more of the following dose reduction techniques:  automated exposure control, adjustment of the mA and/or kV according to patient size, and/or use of iterative reconstruction technique. COMPARISON: No relevant prior studies available. FINDINGS: Vertebrae:  Degree of scoliosis. Multilevel degenerative joint disease within the facets. Mild degenerative anterolisthesis of C3 on C4. No acute fracture. Discs/spinal canal/neural foramina:  C6-C7 anterior discectomy and fusion with anterior plate and screw fixation in anatomic alignment. Degenerative disc disease C7-T1 and to a lesser degree C5-C6. Bony foraminal narrowing C3-C4, right greater than left. Soft tissues:  No acute findings. No evidence of fracture with degenerative changes as described. CT SHOULDER LEFT WO CONTRAST    Result Date: 10/10/2022  EXAMINATION: CT OF THE LEFT SHOULDER WITHOUT CONTRAST 10/10/2022 2:45 pm TECHNIQUE: CT of the left shoulder was performed without the administration of intravenous contrast.  Multiplanar reformatted images are provided for review. Automated exposure control, iterative reconstruction, and/or weight based adjustment of the mA/kV was utilized to reduce the radiation dose to as low as reasonably achievable. COMPARISON: Left shoulder radiograph October 10, 2022 HISTORY ORDERING SYSTEM PROVIDED HISTORY: eval proximal humerus fx TECHNOLOGIST PROVIDED HISTORY: Reason for exam:->eval proximal humerus fx Decision Support Exception - unselect if not a suspected or confirmed emergency medical condition->Emergency Medical Condition (MA) Reason for Exam: eval proximal humerus fx FINDINGS: Bones: Osseous mineralization is decreased. There is an acute and displaced fracture of the proximal humerus with fracture line centered at the surgical neck and extending to involve the greater tuberosity of the humerus.   The humeral DATE/TIME: 10/10/2022 1:35 pm CLINICAL HISTORY: ORDERING SYSTEM PROVIDED  trauma L hip pain  TECHNOLOGIST PROVIDED HISTORY: Reason for exam:->trauma L hip pain  Reason for Exam: right hip pain, fall TECHNIQUE: Two or three views of the right hip with pelvis when performed. COMPARISON: No relevant prior studies available. FINDINGS: Bones/joints:  Osteoarthritic change of the left hip and scoliosis and degenerative change of the spine. No acute fracture. No dislocation. Soft tissues:  No acute findings. Osteoarthritic change of the left hip and scoliosis and degenerative change of the spine. No evidence of fracture. The above labs and diagnostic studies have been reviewed by myself upon admission to inpatient rehabilitation. Barriers to Discharge: Patient  has a past medical history of CHF (congestive heart failure) ([address] and Hypertension. Patient lives at home with her daughter in a two-level house, but she does have a stair lift system in place. POST ADMISSION PHYSICIAN EVALUATION  The patient has agreed to being admitted to our comprehensive inpatient  rehabilitation facility consisting of at least 180 minutes of therapy a day,  5 out of 7 days a week. The patient/family has a good understanding of our discharge process. The  patient has potential to make improvement and is in need of at least two of  the following multidisciplinary therapies including but not limited to  physical, occupational, respiratory, and speech, nutritional services, wound care,   and prosthetics and orthotics. Given the patients complex condition  and risk of further medical complications, rehabilitation services cannot be  safely provided at a lower level of care such as a skilled nursing facility. I have compared the patients medical and functional status at the time of the  preadmission screening and the same on this date, and there are no significant changes.     By signing this document, I acknowledge that I have personally performed a  full physical examination on this patient within 24 hours of admission to  this inpatient rehabilitation facility and have determined the patient to be  able to tolerate the above course of treatment at an intensive level for a  reasonable period of time. I will be completing a detailed individualized  Plan of Care for this patient by day four of the patients stay based upon the  Preadmission Screen, this Post-Admission Evaluation, and the therapy  evaluations. Assessment and Plan:    Displaced left proximal humeral fracture- Left reverse shoulder arthroplasty with tuberosity fixation on 10/12/22,Dr Clark,NWGAIL LUJAE, continue sling. Acute on chronic hyponatremia-Nephrology on board,restarted back on salt tabs. Hold diuretics and continue fluid resuscitation     Hypertension: nifedipine XL     Bowels: Schedule Miralax + Senna S. Follow bowel movements. Enema or suppository if needed. Bladder: Check PVR x 3. [address] if PVR > 200ml or if any volume is > 500 ml. Pain: Tramadol is ordered prn.        Zac Jorge MD, 10/15/2022, 10:07 AM

## 2022-10-15 NOTE — PLAN OF CARE
Problem: Discharge Planning  Goal: Discharge to home or other facility with appropriate resources  Outcome: Progressing     Problem: Skin/Tissue Integrity  Goal: Absence of new skin breakdown  Outcome: Progressing     Problem: Safety - Adult  Goal: Free from fall injury  Outcome: Progressing     Problem: ABCDS Injury Assessment  Goal: Absence of physical injury  Outcome: Progressing     Problem: Pain  Goal: Verbalizes/displays adequate comfort level or baseline comfort level  Outcome: Progressing     Problem: Nutrition Deficit:  Goal: Optimize nutritional status  Outcome: Progressing    Mepilex applied to Left knee. Pt s leg elevated off foot of bed using pillows. Lucrecia Bores in place and changed.      He Hawley BSN, RN   [phone]

## 2022-10-15 NOTE — PROGRESS NOTES
Nephrology progress Note  [phone]  [phone]   [facility]. [employer]       Patient:  Margaret Black   : 1934    Brief HPI    The patient is a 80 y. o.female with significant past medical history of Hyponatremia chronic, Hypertension, CHF brought in after a fall. Labs showed hyponatremia with a sodium of 126 and so we are consulted for further evaluation and management. Stated that her legs gave away and so she fell, denied any prodromal symptoms leading to the fall including chest pains/sob/palpitations/diaphoresis, denies LOC or head injury.   She follows with Dr. Kari Rosen for hyponatremia  she has been taking salt tabs but not as prescribed  No h/o  nausea/emesis or diarrhea  CT of the head and Cspine with no acute changes  Xray of the left shoulder with comminuted fracture of the left humeral neck  Seen by orthopedics and there are plans for left reverse total shoulder arthroplasty     Subjective/Interval history    Pt seen and examined  Hyponatremia stable  Pain in the left shoulder      Review of Systems  Nausea occasional  No emesis    SHx:  visitors at the bed-side      Meds:  Scheduled Meds:   sodium chloride flush  5-40 mL IntraVENous 2 times per day    cetirizine  5 mg Oral Daily    acetaminophen  1,000 mg Oral TID    NIFEdipine  30 mg Oral Daily    sodium chloride  1 g Oral TID WC    enoxaparin  40 mg SubCUTAneous Daily    sennosides-docusate sodium  2 tablet Oral BID    polyethylene glycol  17 g Oral Daily     Continuous Infusions:      PRN Meds:.sodium chloride flush, ondansetron **OR** ondansetron, acetaminophen **OR** acetaminophen, magnesium hydroxide, traMADol, traMADol      Vitals:  BP (!) 143/68   Pulse 77   Temp 97.5 °F (36.4 °C) (Oral)   Resp 15   Ht 5' 5\" (1.651 m)   Wt 163 lb (73.9 kg)   SpO2 94%   BMI 27.12 kg/m²       Physical Exam  General : AAOx3, not in pain or respiratory distress, resting in bed  HEENT : normocephalic, atraumatic, mucosa moist, no palor or icterus  CVS: S1 S2 normal, regular rhythm   Lungs: Clear, no wheezing or crackles. Abd: Soft, bowel sounds normal, non-tender. Ext: b/l LE edema+ left shoulder sling+  : bladder non-distended, no tenderness over the bladder  Neuro: Alert and oriented x 3, nonfocal.      Labs:  CBC with Differential:    Lab Results   Component Value Date/Time    WBC 6.4 10/14/2022 05:25 AM    RBC 3.05 10/14/2022 05:25 AM    HGB 9.0 10/14/2022 05:25 AM    HCT 27.3 10/14/2022 05:25 AM     10/14/2022 05:25 AM    MCV 89.7 10/14/2022 05:25 AM    MCH 29.5 10/14/2022 05:25 AM    MCHC 32.9 10/14/2022 05:25 AM    RDW 14.6 10/14/2022 05:25 AM    LYMPHOPCT 15.3 10/10/2022 02:00 PM    MONOPCT 4.2 10/10/2022 02:00 PM    BASOPCT 0.6 10/10/2022 02:00 PM    MONOSABS 0.4 10/10/2022 02:00 PM    LYMPHSABS 1.5 10/10/2022 02:00 PM    EOSABS 0.0 10/10/2022 02:00 PM    BASOSABS 0.1 10/10/2022 02:00 PM     BMP:    Lab Results   Component Value Date/Time     10/15/2022 05:59 AM    K 3.8 10/15/2022 05:59 AM    K 4.1 10/10/2022 02:00 PM    CL 97 10/15/2022 05:59 AM    CO2 24 10/15/2022 05:59 AM    BUN 14 10/15/2022 05:59 AM    LABALBU 3.8 10/10/2022 02:00 PM    CREATININE <0.5 10/15/2022 05:59 AM    CALCIUM 8.9 10/15/2022 05:59 AM    GFRAA >60 10/15/2022 05:59 AM    LABGLOM >60 10/15/2022 05:59 AM    GLUCOSE 106 10/15/2022 05:59 AM     Ionized Calcium:  No results found for: IONCA  Magnesium:    Lab Results   Component Value Date/Time    MG 2.00 10/15/2022 05:59 AM     Phosphorus:  No results found for: PHOS    Assessment/Plan:    Hyponatremia acceptable   Acute on chronic  Keep on fluid restriction  Will try to wean off salt tabs   Hypertension controlled  Resumed Nifedipine XL  Left shoulder fracture   S/p Left reverse shoulder arthroplasty with tuberosity fixatio 10/12  S/p Kae Kirk MD  10/15/2022  Office Phone : [phone]    Thank you for allowing us to participate in the care of this pt. I willcontinue to follow along.  Please call with questions or concerns.

## 2022-10-15 NOTE — PROGRESS NOTES
[facility]   Phone: [phone]    Physical Therapy    [x] Initial Evaluation            [] Daily Treatment Note         [] Discharge Summary      Patient: Cristina Villanueva   : 1934   MRN: [id number]   Date of Service:  10/15/2022  Admitting Diagnosis: Left supracondylar humerus fracture, sequela  Current Admission Summary: 70-year-old female with PMHx of CHF, hypertension, chronic hyponatremia and SIADH presented after sustaining a fall while going to the bathroom. Patient reported pain in her left shoulder and right hip. X-ray revealed a communicated fracture of left humeral neck with humeral shaft displaced. CT head negative for any acute intracranial pathology. Patient taken to surgery by Dr. Yeyo Flaherty on 10/12 for a repair of her Left comminuted and displaced proximal humerus fracture with a Left reverse shoulder arthroplasty with tuberosity fixation. Post op, patient started in therapies, and patient is able to tolerate 3 hours of therapy 5 days per week and is medically appropriate for rehab on the Acute Rehabilitation Unit. Patient has regular Medicare insurance. She is agreeable to rehab unit treatment. Patient lives at home with her daughter in a two-level house, but she does have a stair lift system in place. Past Medical History:  has a past medical history of CHF (congestive heart failure) ([address] and Hypertension. Past Surgical History:  has a past surgical history that includes Cholecystectomy;  section; and shoulder surgery (Left, 10/12/2022). Discharge Recommendations:  To be determined pending progress  DME Required For Discharge:  to be determined pending progress, likely will need wheelchair   Precautions/Restrictions: high fall risk  Weight Bearing Restrictions: non weight bearing  [] Right Upper Extremity  [x] Left Upper Extremity [] Right Lower Extremity  [] Left Lower Extremity     Required Braces/Orthotics: GOMEZ abductor sling   [] Right  [x] Left  Positional Restrictions:no PROM or AROM L shoulder movement, okay for AROM elbow/forearm, wrist and hand    Lives With: daughter               Type of Home: house  Home Layout: two level, 1/2 bath on main level, bedroom/bathroom upstairs, chair lift to 2nd level  Home Access:  2 step to enter without rails , has portable ramp   Bathroom Layout: tub/shower unit, walk in shower, uses WIS  Bathroom Equipment: grab bars in shower, shower chair, toilet raiser  Toilet Height: standard height  Home Equipment: rolling walker, manual wheelchair, reacher, oxygen only at night; purewick at nights  Transfer Assistance: Independent without use of device  Ambulation Assistance:modified independent with use of FWW in house, w/c in community  ADL Assistance: independent with all ADL's  IADL Assistance: requires assistance with all homemaking tasks, requires assistance for medication management  Active :        [] Yes                 [x] No daughter provides transportation  Hand Dominance: [] Left                 [x] Right  Hobbies: enjoys reading the paper, watching TV  Recent Falls: 1 recent fall in last 6 months prior to this fall that lead to this admission     Examination   Vision:   Vision Corrective Device: wears glasses at all times  Hearing:   hard of hearing, left hearing aid, right hearing aid    Observation:   General Observation:  L sling in place on arrival, surgical dressing c/d/i  Posture:   Kyphotic and rounded shoulders  Sensation:   reports numbness and tingling in all extremities--chronic  ROM:   (B) LE AROM WFL  Strength:   (B) LE strength grossly WFL  Decision Making: high complexity  Clinical Presentation: stable      Subjective  General: Patient is supine in bed upon PT/OT arrival, 7/10 pain noted in (L) shoulder, increases to 8/10 with mobility. Pt is agreeable to progression of mobility. Pain: 7/10.   Location: L shoulder  Pain Interventions: pain medication in place prior to arrival       Functional Mobility  Bed Mobility  Supine to Sit: 2 person assistance with max A x 2   Rolling Right: maximum assistance  Scootin person assistance with max A x 2   Comments:  Transfers  Sit to stand transfer: 2 person assistance with max A x 2   Stand to sit transfer: 2 person assistance with max A x 2   Stand pivot transfer: 2 person assistance with max A x 2   Toilet transfer: 2 person assistance with max A x 2   Comments:  Ambulation  Ambulation not tested on this date secondary to patient requires maximal assistance for completion of STS. Distance:   Gait Mechanics:   Comments:    Stair Mobility  Stair mobility not completed on this date. Comments:  Wheelchair Mobility:  No w/c mobility completed on this date. Comments:  Balance  Static Sitting Balance: fair (+): maintains balance at SBA/supervision without use of UE support  Dynamic Sitting Balance: fair (+): maintains balance at SBA/supervision without use of UE support  Static Standing Balance: poor (-): requires max (A) to maintain balance  Dynamic Standing Balance: poor (-): requires max (A) to maintain balance  Comments:    Patient requires max A to maintain standing balance, max A x 2 for transfers    Other Therapeutic Interventions    Heavy posterior L sided lean with all activities, unable to self correct. Increased time spent to reposition brace. Assisted with sponge bathing while at toilet. Increased time for all tasks due to increased pain. Max a for standing balance during pericare, patient maintains (L) sided lean in standing. Second session: Pt up in chair with daughter present. Reports 7/10 pain. Pt's brace askew and pulling significantly on pt's neck. PT attempted to look up instructions for brace but none were in room, and the closest that could be found on the manufacturers website did not have all the same parts. Pt transferred from chair to w/c with max x 2 sit pivot.  Pt then brushing teeth with OT (see OT note) and hit her wrist on sink which caused her IV to come out. RN in to remove IV and dress wound. Pt sat EOB x 15 min while therapists adjusted brace for safety and comfort as best they could with pictures from 's website,  with min assist to support left UE and to ensure she did not lean to left as she has a tendency to do so. Pt then max x2 for sit to supine and for rolling for pericare to right. Pt then positioned in bed for comfort and left with RN to place new purewick. Functional Outcomes                 Cognition  Overall Cognitive Status: Impaired  Arousal/Alterness: appropriate responses to stimuli  Following Commands: follows one step commands with repetition, follows one step commands with increased time  Safety Judgement: decreased awareness of need for assistance  Initiation: requires cues for some  Orientation:    alert and oriented x 4  Command Following:   impaired--inconsistent with participation and command following    Education  Barriers To Learning: cognition and hearing  Patient Education: patient educated on goals, PT role and benefits, plan of care, weight-bearing education, general safety, functional mobility training, transfer training, discharge recommendations  Learning Assessment:  patient verbalizes understanding, would benefit from continued reinforcement    Assessment  Activity Tolerance: limited due to increased pain with any mobility  Impairments Requiring Therapeutic Intervention: decreased functional mobility, decreased strength, decreased safety awareness, decreased cognition, decreased endurance, decreased balance  Prognosis: guarded  Clinical Assessment: Patient is a 81 yo female s/p fall with Left reverse shoulder arthroplasty with tuberosity fixation and abductor sling in place prior to arrival. Patient presents with increased pain and fluctuation participation with all mobility, strong (L) sided lean consistently during session.  Patient requires max A x 2 for all mobility and will benefit from continued therapy services to improve independence.    Safety Interventions: patient left in chair, chair alarm in place, call light within reach, and nurse notified    Plan  Frequency: 5 x/week, 90 min/day  Current Treatment Recommendations: strengthening, ROM, balance training, functional mobility training, transfer training, gait training, stair training, endurance training, patient/caregiver education, safety education, and equipment evaluation/education    Goals  Patient Goals: to go home and be independent    Short Term Goals:  Time Frame: 2 weeks   Patient will complete supine<>sit transfer at moderate assistance   Patient will complete lateral transfer at moderate assistance   Patient will complete manual w/c propulsion 10 ft at moderate assistance    To be met in 3 weeks:  Patient will complete supine<>sit transfer at min A I  Patient will complete stand step transfer at min A  Patient will complete manual w/c propulsion 100 ft at min A  Patient will tolerate ambulation x 10' with use of LRAD with mod A     Therapy Session Time      Individual Group Co-treatment   Time In      0730   Time Out      0845   Minutes      75     Timed Code Treatment Minutes:   60 minutes  Total Treatment Minutes:  75 minutes  Second Session Therapy Time:   Individual Concurrent Group Co-treatment   Time In       1510   Time Out       1410   Minutes       55         Timed Code Treatment Minutes:  06+20      Total Treatment Minutes:  130      Electronically Signed By: Theresa Cooper, PT    Theresa Cooper PT, DPT [id number]    Second session completed by John Mccain [id number]

## 2022-10-15 NOTE — PROGRESS NOTES
[facility] Skin Assessment     NAME:  Delta Conner  YOB: 1934  MEDICAL RECORD NUMBER:  [id number]    The patient is being assess for  Admission    I agree that 2 RN's have performed a thorough Head to Toe Skin Assessment on the patient. ALL assessment sites listed below have been assessed. Areas assessed by both nurses:    Head, Face, Ears, Shoulders, Back, Chest, Arms, Elbows, Hands, Sacrum. Buttock, Coccyx, Ischium, and Legs. Feet and Heels        Does the Patient have a Wound? Yes wound(s) were present on assessment. LDA wound assessment was Initiated and completed  No pressure wounds present. Wounds on bilateral knees scattered bruises due to trauma.        Ihsan Prevention initiated:  No   Wound Care Orders initiated:  No    Pressure Injury (Stage 3,4, Unstageable, DTI, NWPT, and Complex wounds) if present place referral/consult order under [de-identified] No    New and Established Ostomies if present place consult order under : NA      Nurse 1 eSignature: Electronically signed by [place] RN on 10/15/22 at 12:42 AM EDT    **SHARE this note so that the co-signing nurse is able to place an eSignature**    Nurse 2 eSignature: Electronically signed by Tone Hinojosa RN on 10/15/22 at 3:53 PM EDT

## 2022-10-16 PROCEDURE — 1280000000 HC REHAB R&B

## 2022-10-16 PROCEDURE — 6360000002 HC RX W HCPCS: Performed by: PHYSICAL MEDICINE & REHABILITATION

## 2022-10-16 PROCEDURE — 6370000000 HC RX 637 (ALT 250 FOR IP): Performed by: PHYSICAL MEDICINE & REHABILITATION

## 2022-10-16 PROCEDURE — 6370000000 HC RX 637 (ALT 250 FOR IP): Performed by: INTERNAL MEDICINE

## 2022-10-16 RX ADMIN — ENOXAPARIN SODIUM 40 MG: 100 INJECTION SUBCUTANEOUS at 09:37

## 2022-10-16 RX ADMIN — ACETAMINOPHEN 1000 MG: 500 TABLET ORAL at 15:05

## 2022-10-16 RX ADMIN — SODIUM CHLORIDE TAB 1 GM 1 G: 1 TAB at 17:02

## 2022-10-16 RX ADMIN — TRAMADOL HYDROCHLORIDE 50 MG: 50 TABLET ORAL at 10:54

## 2022-10-16 RX ADMIN — CETIRIZINE HYDROCHLORIDE 5 MG: 10 TABLET, FILM COATED ORAL at 09:37

## 2022-10-16 RX ADMIN — TRAMADOL HYDROCHLORIDE 50 MG: 50 TABLET ORAL at 15:04

## 2022-10-16 RX ADMIN — NIFEDIPINE 30 MG: 30 TABLET, EXTENDED RELEASE ORAL at 09:37

## 2022-10-16 RX ADMIN — ACETAMINOPHEN 1000 MG: 500 TABLET ORAL at 09:37

## 2022-10-16 RX ADMIN — STANDARDIZED SENNA CONCENTRATE AND DOCUSATE SODIUM 2 TABLET: 8.6; 5 TABLET ORAL at 20:29

## 2022-10-16 RX ADMIN — TRAMADOL HYDROCHLORIDE 50 MG: 50 TABLET ORAL at 06:23

## 2022-10-16 RX ADMIN — SODIUM CHLORIDE TAB 1 GM 1 G: 1 TAB at 09:37

## 2022-10-16 RX ADMIN — TRAMADOL HYDROCHLORIDE 50 MG: 50 TABLET ORAL at 22:13

## 2022-10-16 ASSESSMENT — PAIN DESCRIPTION - LOCATION
LOCATION: ARM;SHOULDER
LOCATION: ARM
LOCATION: ARM
LOCATION: ARM;SHOULDER

## 2022-10-16 ASSESSMENT — PAIN DESCRIPTION - DESCRIPTORS
DESCRIPTORS: ACHING;SHARP
DESCRIPTORS: ACHING;SHARP
DESCRIPTORS: ACHING;SORE
DESCRIPTORS: ACHING;SHARP

## 2022-10-16 ASSESSMENT — PAIN SCALES - GENERAL
PAINLEVEL_OUTOF10: 4
PAINLEVEL_OUTOF10: 5
PAINLEVEL_OUTOF10: 4
PAINLEVEL_OUTOF10: 5

## 2022-10-16 ASSESSMENT — PAIN DESCRIPTION - PAIN TYPE
TYPE: ACUTE PAIN;SURGICAL PAIN
TYPE: SURGICAL PAIN
TYPE: ACUTE PAIN;SURGICAL PAIN
TYPE: SURGICAL PAIN;ACUTE PAIN

## 2022-10-16 ASSESSMENT — PAIN DESCRIPTION - FREQUENCY
FREQUENCY: CONTINUOUS

## 2022-10-16 ASSESSMENT — PAIN DESCRIPTION - ONSET
ONSET: ON-GOING

## 2022-10-16 ASSESSMENT — PAIN - FUNCTIONAL ASSESSMENT: PAIN_FUNCTIONAL_ASSESSMENT: PREVENTS OR INTERFERES SOME ACTIVE ACTIVITIES AND ADLS

## 2022-10-16 ASSESSMENT — PAIN DESCRIPTION - ORIENTATION
ORIENTATION: LEFT

## 2022-10-16 NOTE — PLAN OF CARE
Problem: Discharge Planning  Goal: Discharge to home or other facility with appropriate resources  Outcome: Progressing  Flowsheets (Taken 10/16/2022 1608)  Discharge to home or other facility with appropriate resources:   Identify barriers to discharge with patient and caregiver   Identify discharge learning needs (meds, wound care, etc)   Refer to discharge planning if patient needs post-hospital services based on physician order or complex needs related to functional status, cognitive ability or social support system     Problem: Skin/Tissue Integrity  Goal: Absence of new skin breakdown  Description: 1. Monitor for areas of redness and/or skin breakdown  2. Assess vascular access sites hourly  3. Every 4-6 hours minimum:  Change oxygen saturation probe site  4. Every 4-6 hours:  If on nasal continuous positive airway pressure, respiratory therapy assess nares and determine need for appliance change or resting period.   Outcome: Progressing     Problem: Safety - Adult  Goal: Free from fall injury  Outcome: Progressing  Flowsheets (Taken 10/16/2022 1608)  Free From Fall Injury: Instruct family/caregiver on patient safety     Problem: ABCDS Injury Assessment  Goal: Absence of physical injury  Outcome: Progressing  Flowsheets (Taken 10/16/2022 1608)  Absence of Physical Injury: Implement safety measures based on patient assessment     Problem: Pain  Goal: Verbalizes/displays adequate comfort level or baseline comfort level  Outcome: Progressing  Flowsheets (Taken 10/16/2022 1608)  Verbalizes/displays adequate comfort level or baseline comfort level:   Encourage patient to monitor pain and request assistance   Assess pain using appropriate pain scale   Administer analgesics based on type and severity of pain and evaluate response   Implement non-pharmacological measures as appropriate and evaluate response     Problem: Nutrition Deficit:  Goal: Optimize nutritional status  Outcome: Progressing  Flowsheets (Taken 10/16/2022 1705)  Nutrient intake appropriate for improving, restoring, or maintaining nutritional needs:   Assess nutritional status and recommend course of action   Monitor oral intake, labs, and treatment plans   Recommend appropriate diets, oral nutritional supplements, and vitamin/mineral supplements

## 2022-10-16 NOTE — PROGRESS NOTES
Nephrology progress Note  [phone]  [phone]   [facility]. [employer]       Patient:  Collette Munoz   : 1934    Brief HPI    The patient is a 80 y. o.female with significant past medical history of Hyponatremia chronic, Hypertension, CHF brought in after a fall. Labs showed hyponatremia with a sodium of 126 and so we are consulted for further evaluation and management. Stated that her legs gave away and so she fell, denied any prodromal symptoms leading to the fall including chest pains/sob/palpitations/diaphoresis, denies LOC or head injury.   She follows with Dr. Ivanna Barroso for hyponatremia  she has been taking salt tabs but not as prescribed  No h/o  nausea/emesis or diarrhea  CT of the head and Cspine with no acute changes  Xray of the left shoulder with comminuted fracture of the left humeral neck  Seen by orthopedics and there are plans for left reverse total shoulder arthroplasty     Subjective/Interval history    Pt seen and examined  Hyponatremia stable  Pain in the left shoulder      Review of Systems  Nausea occasional  No emesis    SHx:  visitors at the bed-side      Meds:  Scheduled Meds:   sodium chloride  1 g Oral BID WC    cetirizine  5 mg Oral Daily    acetaminophen  1,000 mg Oral TID    NIFEdipine  30 mg Oral Daily    enoxaparin  40 mg SubCUTAneous Daily    sennosides-docusate sodium  2 tablet Oral BID    polyethylene glycol  17 g Oral Daily     Continuous Infusions:      PRN Meds:.sodium chloride flush, ondansetron **OR** ondansetron, acetaminophen **OR** acetaminophen, magnesium hydroxide, traMADol, traMADol      Vitals:  /75   Pulse 76   Temp 97.9 °F (36.6 °C) (Oral)   Resp 16   Ht 5' 5\" (1.651 m)   Wt 163 lb (73.9 kg)   SpO2 92%   BMI 27.12 kg/m²       Physical Exam  General : AAOx3, not in pain or respiratory distress, resting in bed  HEENT : normocephalic, atraumatic, mucosa moist, no palor or icterus  CVS: S1 S2 normal, regular rhythm   Lungs: Clear, no wheezing or crackles. Abd: Soft, bowel sounds normal, non-tender. Ext: b/l LE edema+ left shoulder sling+  : bladder non-distended, no tenderness over the bladder  Neuro: Alert and oriented x 3, nonfocal.      Labs:  CBC with Differential:    Lab Results   Component Value Date/Time    WBC 6.4 10/14/2022 05:25 AM    RBC 3.05 10/14/2022 05:25 AM    HGB 9.0 10/14/2022 05:25 AM    HCT 27.3 10/14/2022 05:25 AM     10/14/2022 05:25 AM    MCV 89.7 10/14/2022 05:25 AM    MCH 29.5 10/14/2022 05:25 AM    MCHC 32.9 10/14/2022 05:25 AM    RDW 14.6 10/14/2022 05:25 AM    LYMPHOPCT 15.3 10/10/2022 02:00 PM    MONOPCT 4.2 10/10/2022 02:00 PM    BASOPCT 0.6 10/10/2022 02:00 PM    MONOSABS 0.4 10/10/2022 02:00 PM    LYMPHSABS 1.5 10/10/2022 02:00 PM    EOSABS 0.0 10/10/2022 02:00 PM    BASOSABS 0.1 10/10/2022 02:00 PM     BMP:    Lab Results   Component Value Date/Time     10/15/2022 05:59 AM    K 3.8 10/15/2022 05:59 AM    K 4.1 10/10/2022 02:00 PM    CL 97 10/15/2022 05:59 AM    CO2 24 10/15/2022 05:59 AM    BUN 14 10/15/2022 05:59 AM    LABALBU 3.8 10/10/2022 02:00 PM    CREATININE <0.5 10/15/2022 05:59 AM    CALCIUM 8.9 10/15/2022 05:59 AM    GFRAA >60 10/15/2022 05:59 AM    LABGLOM >60 10/15/2022 05:59 AM    GLUCOSE 106 10/15/2022 05:59 AM     Ionized Calcium:  No results found for: IONCA  Magnesium:    Lab Results   Component Value Date/Time    MG 2.00 10/15/2022 05:59 AM     Phosphorus:  No results found for: PHOS    Assessment/Plan:    Hyponatremia acceptable   Acute on chronic  Keep on fluid restriction  Will try to wean off salt tabs - reduced to twice daily  Hypertension controlled  Resumed Nifedipine XL  Left shoulder fracture   S/p Left reverse shoulder arthroplasty with tuberosity fixatio 10/12  S/p Kae Kirk MD  10/16/2022  Office Phone : [phone]    Thank you for allowing us to participate in the care of this pt. I willcontinue to follow along. Please call with questions or concerns.

## 2022-10-17 LAB
ANION GAP SERPL CALCULATED.3IONS-SCNC: 12 MMOL/L (ref 3–16)
ANION GAP SERPL CALCULATED.3IONS-SCNC: 7 MMOL/L (ref 3–16)
BUN BLDV-MCNC: 12 MG/DL (ref 7–20)
BUN BLDV-MCNC: 8 MG/DL (ref 7–20)
CALCIUM SERPL-MCNC: 8.7 MG/DL (ref 8.3–10.6)
CALCIUM SERPL-MCNC: 9 MG/DL (ref 8.3–10.6)
CHLORIDE BLD-SCNC: 92 MMOL/L (ref 99–110)
CHLORIDE BLD-SCNC: 93 MMOL/L (ref 99–110)
CO2: 24 MMOL/L (ref 21–32)
CO2: 27 MMOL/L (ref 21–32)
CREAT SERPL-MCNC: <0.5 MG/DL (ref 0.6–1.2)
CREAT SERPL-MCNC: <0.5 MG/DL (ref 0.6–1.2)
GFR AFRICAN AMERICAN: >60
GFR NON-AFRICAN AMERICAN: >60
GFR SERPL CREATININE-BSD FRML MDRD: >60 ML/MIN/{1.73_M2}
GLUCOSE BLD-MCNC: 93 MG/DL (ref 70–99)
GLUCOSE BLD-MCNC: 98 MG/DL (ref 70–99)
HCT VFR BLD CALC: 26.2 % (ref 36–48)
HEMOGLOBIN: 8.8 G/DL (ref 12–16)
MCH RBC QN AUTO: 29.2 PG (ref 26–34)
MCHC RBC AUTO-ENTMCNC: 33.5 G/DL (ref 31–36)
MCV RBC AUTO: 87.1 FL (ref 80–100)
PDW BLD-RTO: 14 % (ref 12.4–15.4)
PLATELET # BLD: 144 K/UL (ref 135–450)
PLATELET SLIDE REVIEW: ABNORMAL
PMV BLD AUTO: 7.3 FL (ref 5–10.5)
POTASSIUM SERPL-SCNC: 4 MMOL/L (ref 3.5–5.1)
POTASSIUM SERPL-SCNC: 4.4 MMOL/L (ref 3.5–5.1)
RBC # BLD: 3.01 M/UL (ref 4–5.2)
SLIDE REVIEW: ABNORMAL
SODIUM BLD-SCNC: 127 MMOL/L (ref 136–145)
SODIUM BLD-SCNC: 128 MMOL/L (ref 136–145)
WBC # BLD: 6.8 K/UL (ref 4–11)

## 2022-10-17 PROCEDURE — APPNB45 APP NON BILLABLE 31-45 MINUTES: Performed by: NURSE PRACTITIONER

## 2022-10-17 PROCEDURE — 99024 POSTOP FOLLOW-UP VISIT: CPT | Performed by: NURSE PRACTITIONER

## 2022-10-17 PROCEDURE — 97110 THERAPEUTIC EXERCISES: CPT

## 2022-10-17 PROCEDURE — 80048 BASIC METABOLIC PNL TOTAL CA: CPT

## 2022-10-17 PROCEDURE — 36415 COLL VENOUS BLD VENIPUNCTURE: CPT

## 2022-10-17 PROCEDURE — 6370000000 HC RX 637 (ALT 250 FOR IP): Performed by: INTERNAL MEDICINE

## 2022-10-17 PROCEDURE — 85027 COMPLETE CBC AUTOMATED: CPT

## 2022-10-17 PROCEDURE — 97530 THERAPEUTIC ACTIVITIES: CPT

## 2022-10-17 PROCEDURE — 97535 SELF CARE MNGMENT TRAINING: CPT

## 2022-10-17 PROCEDURE — 6370000000 HC RX 637 (ALT 250 FOR IP): Performed by: PHYSICAL MEDICINE & REHABILITATION

## 2022-10-17 PROCEDURE — 6360000002 HC RX W HCPCS: Performed by: PHYSICAL MEDICINE & REHABILITATION

## 2022-10-17 PROCEDURE — 1280000000 HC REHAB R&B

## 2022-10-17 PROCEDURE — 83930 ASSAY OF BLOOD OSMOLALITY: CPT

## 2022-10-17 RX ORDER — TRAZODONE HYDROCHLORIDE 50 MG/1
50 TABLET ORAL NIGHTLY PRN
Status: DISCONTINUED | OUTPATIENT
Start: 2022-10-17 | End: 2022-10-25 | Stop reason: HOSPADM

## 2022-10-17 RX ORDER — DIAZEPAM 2 MG/1
1 TABLET ORAL DAILY PRN
Status: DISCONTINUED | OUTPATIENT
Start: 2022-10-17 | End: 2022-10-25 | Stop reason: HOSPADM

## 2022-10-17 RX ORDER — HYDRALAZINE HYDROCHLORIDE 25 MG/1
50 TABLET, FILM COATED ORAL EVERY 8 HOURS PRN
Status: DISCONTINUED | OUTPATIENT
Start: 2022-10-17 | End: 2022-10-25 | Stop reason: HOSPADM

## 2022-10-17 RX ORDER — DIPHENHYDRAMINE HCL 25 MG
25 TABLET ORAL EVERY 6 HOURS PRN
Status: DISCONTINUED | OUTPATIENT
Start: 2022-10-17 | End: 2022-10-25 | Stop reason: HOSPADM

## 2022-10-17 RX ORDER — DIAZEPAM 2 MG/1
0.5 TABLET ORAL DAILY PRN
Status: DISCONTINUED | OUTPATIENT
Start: 2022-10-17 | End: 2022-10-17 | Stop reason: DRUGHIGH

## 2022-10-17 RX ADMIN — STANDARDIZED SENNA CONCENTRATE AND DOCUSATE SODIUM 2 TABLET: 8.6; 5 TABLET ORAL at 08:41

## 2022-10-17 RX ADMIN — ONDANSETRON 4 MG: 4 TABLET, ORALLY DISINTEGRATING ORAL at 13:49

## 2022-10-17 RX ADMIN — TRAMADOL HYDROCHLORIDE 50 MG: 50 TABLET ORAL at 10:32

## 2022-10-17 RX ADMIN — ENOXAPARIN SODIUM 40 MG: 100 INJECTION SUBCUTANEOUS at 08:43

## 2022-10-17 RX ADMIN — TRAMADOL HYDROCHLORIDE 100 MG: 50 TABLET, FILM COATED ORAL at 15:16

## 2022-10-17 RX ADMIN — DIAZEPAM 0.5 MG: 2 TABLET ORAL at 12:47

## 2022-10-17 RX ADMIN — NIFEDIPINE 30 MG: 30 TABLET, EXTENDED RELEASE ORAL at 08:43

## 2022-10-17 RX ADMIN — TRAMADOL HYDROCHLORIDE 100 MG: 50 TABLET, FILM COATED ORAL at 19:38

## 2022-10-17 RX ADMIN — CETIRIZINE HYDROCHLORIDE 5 MG: 10 TABLET, FILM COATED ORAL at 08:42

## 2022-10-17 RX ADMIN — ACETAMINOPHEN 1000 MG: 500 TABLET ORAL at 08:41

## 2022-10-17 RX ADMIN — SODIUM CHLORIDE TAB 1 GM 1 G: 1 TAB at 08:42

## 2022-10-17 RX ADMIN — SODIUM CHLORIDE TAB 1 GM 1 G: 1 TAB at 15:17

## 2022-10-17 RX ADMIN — STANDARDIZED SENNA CONCENTRATE AND DOCUSATE SODIUM 2 TABLET: 8.6; 5 TABLET ORAL at 19:38

## 2022-10-17 ASSESSMENT — PAIN DESCRIPTION - PAIN TYPE
TYPE: SURGICAL PAIN

## 2022-10-17 ASSESSMENT — PAIN DESCRIPTION - LOCATION
LOCATION: ARM;SHOULDER
LOCATION: ARM
LOCATION: ARM;SHOULDER
LOCATION: ARM
LOCATION: ARM
LOCATION: ARM;HAND;KNEE

## 2022-10-17 ASSESSMENT — PAIN DESCRIPTION - FREQUENCY
FREQUENCY: CONTINUOUS

## 2022-10-17 ASSESSMENT — PAIN SCALES - WONG BAKER
WONGBAKER_NUMERICALRESPONSE: 0
WONGBAKER_NUMERICALRESPONSE: 0

## 2022-10-17 ASSESSMENT — PAIN SCALES - GENERAL
PAINLEVEL_OUTOF10: 7
PAINLEVEL_OUTOF10: 4
PAINLEVEL_OUTOF10: 3
PAINLEVEL_OUTOF10: 7
PAINLEVEL_OUTOF10: 6

## 2022-10-17 ASSESSMENT — PAIN DESCRIPTION - ORIENTATION
ORIENTATION: LEFT
ORIENTATION: LEFT;RIGHT
ORIENTATION: LEFT

## 2022-10-17 ASSESSMENT — PAIN DESCRIPTION - DESCRIPTORS
DESCRIPTORS: ACHING;DULL
DESCRIPTORS: ACHING
DESCRIPTORS: ACHING
DESCRIPTORS: SORE
DESCRIPTORS: ACHING
DESCRIPTORS: ACHING

## 2022-10-17 ASSESSMENT — PAIN DESCRIPTION - ONSET
ONSET: ON-GOING
ONSET: ON-GOING

## 2022-10-17 ASSESSMENT — PAIN - FUNCTIONAL ASSESSMENT
PAIN_FUNCTIONAL_ASSESSMENT: PREVENTS OR INTERFERES SOME ACTIVE ACTIVITIES AND ADLS

## 2022-10-17 NOTE — PROGRESS NOTES
[facility]   Nephrology progress Note  [phone]   [facility]. [facility]           SUMMARY :  We are following this patient for hyponatremia  80year-old female with past medical history of chronic hyponatremia, hypertension, CHF, presented to the hospital after a fall. At that time her serum sodium was 126. She sees my partner Dr. Jarad Jackson  for chronic hyponatremia. SUBJECTIVE:   Patient progress reviewed. The patient has no new complaints, her daughter is by bedside    Physical Exam:    VITALS:  BP (!) 142/89   Pulse 88   Temp 97.7 °F (36.5 °C)   Resp 16   Ht 5' 5\" (1.651 m)   Wt 163 lb (73.9 kg)   SpO2 98%   BMI 27.12 kg/m²   BLOOD PRESSURE RANGE:  Systolic (94PBT), LWC:478 , Min:142 , URD:600   ; Diastolic (25IBR), YFW:51, Min:81, Max:89    24HR INTAKE/OUTPUT:    Intake/Output Summary (Last 24 hours) at 10/17/2022 1208  Last data filed at 10/17/2022 7134  Gross per 24 hour   Intake 810 ml   Output 3450 ml   Net -2640 ml       Gen:  alert, oriented x 2, hard of hearing  PERRL , EOM +  Pallor +, No icterus  JVP not raised   CV: RRR no murmur or rub . Lungs:B/ L air entry, Normal breath sounds   Abd: soft, bowel sounds + , No organomegaly   Ext: No edema, no cyanosis  Skin: Warm.   No rash  Neuro: nonfocal.      DATA:    CBC with Differential:    Lab Results   Component Value Date/Time    WBC 6.8 10/17/2022 05:22 AM    RBC 3.01 10/17/2022 05:22 AM    HGB 8.8 10/17/2022 05:22 AM    HCT 26.2 10/17/2022 05:22 AM     10/17/2022 05:22 AM    MCV 87.1 10/17/2022 05:22 AM    MCH 29.2 10/17/2022 05:22 AM    MCHC 33.5 10/17/2022 05:22 AM    RDW 14.0 10/17/2022 05:22 AM    LYMPHOPCT 15.3 10/10/2022 02:00 PM    MONOPCT 4.2 10/10/2022 02:00 PM    BASOPCT 0.6 10/10/2022 02:00 PM    MONOSABS 0.4 10/10/2022 02:00 PM    LYMPHSABS 1.5 10/10/2022 02:00 PM    EOSABS 0.0 10/10/2022 02:00 PM    BASOSABS 0.1 10/10/2022 02:00 PM     CMP:    Lab Results   Component Value Date/Time     10/17/2022 05:22 AM    K 4.4 10/17/2022 05:22 AM    K 4.1 10/10/2022 02:00 PM    CL 92 10/17/2022 05:22 AM    CO2 24 10/17/2022 05:22 AM    BUN 8 10/17/2022 05:22 AM    CREATININE <0.5 10/17/2022 05:22 AM    GFRAA >60 10/17/2022 05:22 AM    AGRATIO 1.2 10/10/2022 02:00 PM    LABGLOM >60 10/17/2022 05:22 AM    GLUCOSE 98 10/17/2022 05:22 AM    PROT 6.9 10/10/2022 02:00 PM    LABALBU 3.8 10/10/2022 02:00 PM    CALCIUM 8.7 10/17/2022 05:22 AM    BILITOT 0.4 10/10/2022 02:00 PM    ALKPHOS 69 10/10/2022 02:00 PM    AST 16 10/10/2022 02:00 PM    ALT 11 10/10/2022 02:00 PM     Magnesium:    Lab Results   Component Value Date/Time    MG 2.00 10/15/2022 05:59 AM     Phosphorus:  No results found for: PHOS  Uric Acid:  No results found for: Stephanie Pink  U/A:    Lab Results   Component Value Date/Time    COLORU Yellow 10/10/2022 07:00 PM    PROTEINU Negative 10/10/2022 07:00 PM    PHUR 6.0 10/10/2022 07:00 PM    WBCUA 2 10/10/2022 07:00 PM    RBCUA 1 10/10/2022 07:00 PM    BACTERIA None Seen 10/10/2022 07:00 PM    CLARITYU Clear 10/10/2022 07:00 PM    SPECGRAV 1.025 10/10/2022 07:00 PM    LEUKOCYTESUR SMALL 10/10/2022 07:00 PM    UROBILINOGEN 1.0 10/10/2022 07:00 PM    BILIRUBINUR Negative 10/10/2022 07:00 PM    BLOODU Negative 10/10/2022 07:00 PM    GLUCOSEU Negative 10/10/2022 07:00 PM         IMPRESSION/RECOMMENDATIONS:      Diagnosis and Plan     Hyponatremia: Etiology unclear said to be SIADH  Check urine sodium urine osmolality plasma osmolality  If SIADH would like to try daily urea  Hypertension  Left shoulder fracture s/p reverse left shoulder arthroplasty with tuberosity fixation on 10/12/2022:    Mina Gupta MD

## 2022-10-17 NOTE — PLAN OF CARE
Problem: Discharge Planning  Goal: Discharge to home or other facility with appropriate resources  Outcome: Progressing     Problem: Skin/Tissue Integrity  Goal: Absence of new skin breakdown  Description: 1. Monitor for areas of redness and/or skin breakdown  2. Assess vascular access sites hourly  3. Every 4-6 hours minimum:  Change oxygen saturation probe site  4. Every 4-6 hours:  If on nasal continuous positive airway pressure, respiratory therapy assess nares and determine need for appliance change or resting period.   Outcome: Progressing     Problem: Safety - Adult  Goal: Free from fall injury  Outcome: Progressing     Problem: ABCDS Injury Assessment  Goal: Absence of physical injury  Outcome: Progressing     Problem: Pain  Goal: Verbalizes/displays adequate comfort level or baseline comfort level  Outcome: Progressing     Problem: Nutrition Deficit:  Goal: Optimize nutritional status  Outcome: Progressing     Problem: Chronic Conditions and Co-morbidities  Goal: Patient's chronic conditions and co-morbidity symptoms are monitored and maintained or improved  Outcome: Progressing

## 2022-10-17 NOTE — PROGRESS NOTES
[facility] [facility]   Phone: [phone]    Physical Therapy    [] Initial Evaluation            [x] Daily Treatment Note         [] Discharge Summary      Patient: Mohan Gallagher   : 1934   MRN: [id number]   Date of Service:  10/17/2022  Admitting Diagnosis: Left supracondylar humerus fracture, sequela  Current Admission Summary: 80-year-old female with PMHx of CHF, hypertension, chronic hyponatremia and SIADH presented after sustaining a fall while going to the bathroom. Patient reported pain in her left shoulder and right hip. X-ray revealed a communicated fracture of left humeral neck with humeral shaft displaced. CT head negative for any acute intracranial pathology. Patient taken to surgery by Dr. Freya Page on 10/12 for a repair of her Left comminuted and displaced proximal humerus fracture with a Left reverse shoulder arthroplasty with tuberosity fixation. Post op, patient started in therapies, and patient is able to tolerate 3 hours of therapy 5 days per week and is medically appropriate for rehab on the Acute Rehabilitation Unit. Patient has regular Medicare insurance. She is agreeable to rehab unit treatment. Patient lives at home with her daughter in a two-level house, but she does have a stair lift system in place. Past Medical History:  has a past medical history of CHF (congestive heart failure) ([address] and Hypertension. Past Surgical History:  has a past surgical history that includes Cholecystectomy;  section; and shoulder surgery (Left, 10/12/2022). Discharge Recommendations:  To be determined pending progress  DME Required For Discharge:  to be determined pending progress, likely will need wheelchair   Precautions/Restrictions: high fall risk  Weight Bearing Restrictions: non weight bearing  [] Right Upper Extremity  [x] Left Upper Extremity [] Right Lower Extremity  [] Left Lower Extremity     Required Braces/Orthotics: JENNIFERE abductor sling   [] Right  [x] Left  Positional Restrictions:no PROM or AROM L shoulder movement, okay for AROM elbow/forearm, wrist and hand    Lives With: daughter               Type of Home: house  Home Layout: two level, 1/2 bath on main level, bedroom/bathroom upstairs, chair lift to 2nd level  Home Access:  2 step to enter without rails , has portable ramp   Bathroom Layout: tub/shower unit, walk in shower, uses WIS  Bathroom Equipment: grab bars in shower, shower chair, toilet raiser  Toilet Height: standard height  Home Equipment: rolling walker, manual wheelchair, reacher, oxygen only at night; purewick at nights  Transfer Assistance: Independent without use of device  Ambulation Assistance:modified independent with use of FWW in house, w/c in community  ADL Assistance: independent with all ADL's  IADL Assistance: requires assistance with all homemaking tasks, requires assistance for medication management  Active :        [] Yes                 [x] No daughter provides transportation  Hand Dominance: [] Left                 [x] Right  Hobbies: enjoys reading the paper, watching TV  Recent Falls: 1 recent fall in last 6 months prior to this fall that lead to this admission     Examination   Vision:   Vision Corrective Device: wears glasses at all times  Hearing:   hard of hearing, left hearing aid, right hearing aid    Observation:   General Observation:  L sling in place on arrival, surgical dressing c/d/i  Posture:   Kyphotic and rounded shoulders  Sensation:   reports numbness and tingling in all extremities--chronic  ROM:   (B) LE AROM WFL  Strength:   (B) LE strength grossly WFL  Decision Making: high complexity  Clinical Presentation: stable      Subjective  General: Patient is supine in bed upon PT/OT arrival, pt with multiple complaints regarding positioning and sling, pt very fearful of LUE, daughter present  Pain: 7/10.   Location: L shoulder  Pain Interventions: pain medication in place prior to arrival       Functional Mobility  Bed Mobility  Supine to Sit: 2 person assistance with max A x 2   Rolling Right: maximum assistance  Scootin person assistance with max A x 2   Comments:  Transfers  Sit to stand transfer: 2 person assistance with max A x 2   Stand to sit transfer: 2 person assistance with max A x 2   Stand pivot transfer: 2 person assistance with max A x 2   Toilet transfer: 2 person assistance with max A x 2   Comments:  Ambulation  Ambulation not tested on this date secondary to patient requires maximal assistance to stand. Distance:   Gait Mechanics:   Comments:    Stair Mobility  Stair mobility not completed on this date. Comments:  Wheelchair Mobility:  No w/c mobility completed on this date. Comments:  Balance  Static Sitting Balance: fair (+): maintains balance at SBA/supervision without use of UE support  Dynamic Sitting Balance: fair (+): maintains balance at SBA/supervision without use of UE support  Static Standing Balance: poor (-): requires max (A) to maintain balance  Dynamic Standing Balance: poor (-): requires max (A) to maintain balance  Comments:    Patient requires max A to maintain standing balance, max A x 2 for transfers    Other Therapeutic Interventions    Heavy posterior L sided lean with all activities, unable to self correct. Ortho NP contacted regarding sling. Performed STS in parallel bars with maxA of 2-3 to obtain standing and maxA of 2 to maintain standing. Pt able to maintain standing X 5-20 seconds with significant flexion posture, posterior lean, and L lean. Performed standing at anterior grab bar to don pants with maxA of 2 for STS and maxA to maintain standing. Second session: Pt in recliner on arrival.  Laced BLE into pants dependently for time constraints. Attempted STS with maxA of 3 X 2 attempts without success. Attempted STS with stedy X 3 trials with maxA of 3 without success. Performed maxi-elvis transfer to w/c.   Performed UE exercises and dynamometer measurement with OT. Performed seated LAQ X 10 B, marching X 10 B. Pt returned to bed with use of maxi-elvis. Performed rolling to doff maxi-elvis sling. Assisted with positioning for comfort. Pt remained in bed with alarm on and all needs in reach. Functional Outcomes                 Cognition  Overall Cognitive Status: Impaired  Arousal/Alterness: appropriate responses to stimuli  Following Commands: follows one step commands with repetition, follows one step commands with increased time  Safety Judgement: decreased awareness of need for assistance  Initiation: requires cues for some  Orientation:    alert and oriented x 4  Command Following:   impaired--inconsistent with participation and command following    Education  Barriers To Learning: cognition and hearing  Patient Education: patient educated on goals, PT role and benefits, plan of care, weight-bearing education, general safety, functional mobility training, transfer training, discharge recommendations  Learning Assessment:  patient verbalizes understanding, would benefit from continued reinforcement    Assessment  Activity Tolerance: limited due to increased pain with any mobility  Impairments Requiring Therapeutic Intervention: decreased functional mobility, decreased strength, decreased safety awareness, decreased cognition, decreased endurance, decreased balance  Prognosis: guarded  Clinical Assessment: Patient continues to require significant assistance for all mobility and demonstrates anxiety and fear regarding mobility due to L shoulder sx. Patient requires max A x 2 for all mobility and will benefit from continued therapy services to improve independence.    Safety Interventions: patient left in chair, chair alarm in place, call light within reach, and nurse notified    Plan  Frequency: 5 x/week, 90 min/day  Current Treatment Recommendations: strengthening, ROM, balance training, functional mobility training, transfer training, gait training, stair training, endurance training, patient/caregiver education, safety education, and equipment evaluation/education    Goals  Patient Goals: to go home and be independent    Short Term Goals:  Time Frame: 2 weeks   Patient will complete supine<>sit transfer at moderate assistance   Patient will complete lateral transfer at moderate assistance   Patient will complete manual w/c propulsion 10 ft at moderate assistance    To be met in 3 weeks:  Patient will complete supine<>sit transfer at min A I  Patient will complete stand step transfer at min A  Patient will complete manual w/c propulsion 100 ft at min A  Patient will tolerate ambulation x 10' with use of LRAD with mod A     Therapy Session Time      Individual Group Co-treatment   Time In      0915   Time Out      1000   Minutes      45       Second Session Therapy Time:   Individual Concurrent Group Co-treatment   Time In        1345   Time Out        1440   Minutes        55         Timed Code Treatment Minutes:  14+17      Total Treatment Minutes:  100      Electronically Signed By: Mike Brown, PT

## 2022-10-17 NOTE — PROGRESS NOTES
Admission Period/Goal QM Codes for [facility]. QM Admit Code Goal Code   Eating 4 6   Oral Hygiene 3 6   Toileting Hygiene 1 6   Shower/Bathing 2 6   UB Dressing 1 6   LB Dressing 1 6   Putting on/off Footwear 1 6   Rolling Left and Right 2 3   Sit To Lying 1 3   Lying to Sitting on Bedside 1 3   Sit to Stand 1 3   Chair/Bed to Chair Transfer 1 3   Toilet Transfers 1 6   Car Transfers 88 3   Walk 10 Feet 88 3   Walk 50 Feet with Two Turns 88 9   Walk 150 Feet 88 9   Walk 10 Feet on Uneven Surfaces 88 9   1 Step (Curb) 88 9   4 Steps 88 9   12 Steps 80 9   Picking up Object from Floor 88 9   Wheel 50 Feet with 2 Turns 88 3   Type Manual Manual   Wheel 150 Feet 88 3   Type Manual Manual       Following discussion at the Quality Measure Huddle, the above codes were determined to be the patient's usual performance at admission. OT:  KEN Luciano/BENITA, [id number] 10/18/22, 730     PT:   Kate Calixto, IRVIN [id number] 10/18/2022 724     RN:  LEATHA Ko, [address] [place] [address] 10/17/2022, 1300     :  Whitley Donahue PT, [place] [id number]  10/18/22  10:17 AM

## 2022-10-17 NOTE — CARE COORDINATION
Social Work Admission Assessment    Objective:  Met with pt  and her daughter to complete initial assessment and review role of  in rehab process. Pt oriented to unit. Pt states understanding of this. Current Home Situation:  Patient lives at home with daughter. They reside in a two story home with two steps to enter. The patient has a stair lift to the second floor where her bedroom and full bath is located. Pt's plans re:  Return to work/school/volunteer:  Patient is retired. Patient reports that she enjoys listening to  [employer] and reading the paper. Accessibility to community resources/transportation:  Prior to admission patient was active with [address]Patient's daughter will provide transportation @ discharge. Has pt experienced a recent loss or signigicant life event that would impact their care or ability to participate? _x_No  __Yes - Explain    Has pt ever been treated for emotional disorders? _x_No  __Yes--How does that affect current situation:    How does pt and family cope with stressful events and this hospitalization? Patient reports that the only time she gets stress is when she has to go to a doctor appointment. Patient does not like the hustle and bustle of getting prepared. Special Problem Areas:  None    Discharge Plan: To be determined. Impression/Plan: Ethelyn Schaumann (patient )is a 80year old female that has been admitted to ARU. Provided patient with this SW's card to contact as needed. MYAH informed patient and her daughter about Team Conference to he held on 10/20/22 @ 11:00 AM. Will continue to follow for support and discharge planning.       Electronically signed by MALINDA Manrique on 10/17/2022 at 5:36 PM

## 2022-10-17 NOTE — PROGRESS NOTES
Lauren Kraft  10/17/2022  [id number]    Chief Complaint: Left supracondylar humerus fracture, sequela    Subjective:   No significant weekend events. Multiple positioning complaints this am. Labs reviewed. ROS: No CP, SOB, dyspnea    Objective:  Patient Vitals for the past 24 hrs:   BP Temp Temp src Pulse Resp SpO2   10/16/22 2243 -- -- -- -- 16 --   10/16/22 2213 -- -- -- -- 16 --   10/16/22 2018 (!) 147/81 98.3 °F (36.8 °C) Oral 78 15 91 %   10/16/22 1600 -- -- -- -- 16 --   10/16/22 1504 -- -- -- -- 16 --   10/16/22 1150 -- -- -- -- 16 --   10/16/22 1054 -- -- -- -- 16 --   10/16/22 0935 128/75 97.9 °F (36.6 °C) Oral 76 16 92 %     Gen: No distress, pleasant. Resting in bed  HEENT: Normocephalic, atraumatic. CV: Regular rate and rhythm. No MRG   Resp: No respiratory distress. CTAB   Abd: Soft, nontender nondistended  Ext: No edema. LUE in sling with post op dressing  Neuro: Alert, oriented, appropriately interactive. Laboratory data: Available via EMR. Therapy progress:    PT    Supine to Sit: Dependent  Sit to Supine: Dependent   Sit to Stand: Dependent  Chair/Bed to Chair Transfer: Dependent  Car Transfer:    Ambulation 10 ft:    Ambulation 50 ft:    Ambulation 150 ft:    Stairs - 1 Step:    Stairs - 4 Step:    Stairs - 12 Step:      OT    Eating:    Oral Hygiene: Supervision or touching assistance  Bathing: Substantial/maximal assistance  Upper Body Dressing: Dependent  Lower Body Dressing: Dependent  Toilet Transfer: Dependent  Toilet Hygiene: Dependent    Speech Therapy         Body mass index is 27.12 kg/m². Assessment:  Patient Active Problem List   Diagnosis    Closed displaced fracture of surgical neck of humerus, initial encounter    Osteoporosis with current pathological fracture, initial encounter    Hyponatremia    Left supracondylar humerus fracture, sequela       Plan:   Displaced left proximal humeral fracture- Left reverse shoulder arthroplasty on 10/12 with Dr Yesy Conner.  NWB LUE. Continue sling. Acute on chronic hyponatremia-Nephrology on board,restarted back on salt tab. Fluid restriction. Hypertension: nifedipine XL     Bowel: Per protocol  Bladder: Per protocol  Sleep: Trazodone PRN  Pain: tylenol, scheduled, tramadol PRN  DVT PPx: lovenox  JAIRO: TBD    Lakeisha Jarquin MD 10/17/2022, 8:33 AM    * This document was created using dictation software. While all precautions were taken to ensure accuracy, errors may have occurred. Please disregard any typographical errors.

## 2022-10-17 NOTE — PROGRESS NOTES
[facility]   Phone: [phone]    Occupational Therapy    [] Initial Evaluation            [x] Daily Treatment Note         [] Discharge Summary      Patient: Cristina Villanueva   : 1934   MRN: [id number]   Date of Service:  10/17/2022    Admitting Diagnosis:  Left supracondylar humerus fracture, sequela  Current Admission Summary:  60-year-old female with PMHx of CHF, hypertension, chronic hyponatremia and SIADH presented after sustaining a fall while going to the bathroom. Patient reported pain in her left shoulder and right hip. X-ray revealed a communicated fracture of left humeral neck with humeral shaft displaced. CT head negative for any acute intracranial pathology. Patient taken to surgery by Dr. Yeyo Flaherty on 10/12 for a repair of her Left comminuted and displaced proximal humerus fracture with a Left reverse shoulder arthroplasty with tuberosity fixation. Post op, patient started in therapies, and patient is able to tolerate 3 hours of therapy 5 days per week and is medically appropriate for rehab on the Acute Rehabilitation Unit. Patient has regular Medicare insurance. She is agreeable to rehab unit treatment. Patient lives at home with her daughter in a two-level house, but she does have a stair lift system in place. Past Medical History:  has a past medical history of CHF (congestive heart failure) ([address] and Hypertension. Past Surgical History:  has a past surgical history that includes Cholecystectomy;  section; and shoulder surgery (Left, 10/12/2022).     Discharge Recommendations:24/7 Assist and SUP, HHOT S3    DME Required For Discharge:  may need hip kit for LB ADLs    Precautions/Restrictions: high fall risk, contact precautions, weight bearing, ROM restrictions  Weight Bearing Restrictions: non weight bearing  [] Right Upper Extremity  [x] Left Upper Extremity [] Right Lower Extremity  [] Left Lower Extremity     Required Braces/Orthotics:  abductor sling   [] Right  [x] Left  Positional Restrictions: no PROM or AROM L shoulder movement, okay for AROM elbow/forearm, wrist and hand    Lives With: daughter               Type of Home: house  Home Layout: two level, 1/2 bath on main level, bedroom/bathroom upstairs, chair lift to 2nd level  Home Access:  2 step to enter without rails , has portable ramp   Bathroom Layout: tub/shower unit, walk in shower, uses WIS  Bathroom Equipment: grab bars in shower, shower chair, toilet raiser  Toilet Height: standard height  Home Equipment: rolling walker, manual wheelchair, reacher, oxygen only at night; purewick at nights  Transfer Assistance: Independent without use of device  Ambulation Assistance:modified independent with use of FWW in house, w/c in community  ADL Assistance: independent with all ADL's  IADL Assistance: requires assistance with all homemaking tasks, requires assistance for medication management  Active :        [] Yes                 [x] No daughter provides transportation  Hand Dominance: [] Left                 [x] Right  Hobbies: enjoys reading the paper, watching TV  Recent Falls: 1 recent fall in last 6 months prior to this fall that lead to this admission      Subjective  General: Pt supine in bed upon arival, agreeable to OT/PT co tx with max cues of encouragement. Pt c/o of sling. PT/OT informed Juanito Olvera, ortho NP. Pt requires co-tx secondary to complexity of condition, decreased activity tolerance and increased assistance for ADL/mobility. Pt benefits from x 2 skilled hands to provide increased cues/feedback and promote improved safety and performance with ADLs. Pain: 7/10.   Location: L shoulder  Pain Interventions: pain medication in place prior to arrival        Activities of Daily Living  Basic Activities of Daily Living  Lower Extremity Dressing: dependent  Dressing Comments: max A x2 for standing with assistance of another to thread brief under buttocks/between legs and A of another to pull up pants over hips  Toileting: dependent. Toileting Comments: elliott, incontinent of urine  General Comments: C/o pain from sling throughout  Instrumental Activities of Daily Living  No IADL completed on this date. Functional Mobility  Bed Mobility  Supine to Sit: 2 person assistance with max A of 2   Rolling Right: maximum assistance  Scootin person assistance with max Ax2   Transfers  Sit to stand transfer:dependent assistance, 2 person assistance with max A of 2   Stand to sit transfer: dependent assistance, 2 person assistance with max A of 2   Bed / Chair transfer: dependent assistance, 2 person assistance with max Ax2 . Bed / Chair comments: SPT  Functional Mobility:  Sitting Balance: stand by assistance. Sitting Balance Comment: Seated on EOB  Standing Balance: 2 person assistance with max A x2 . Standing Balance Comment: in // bars: Heavy posterior L sided lean with all activities, unable to self correct. Ortho NP contacted regarding sling. Performed STS in parallel bars with maxA of 2-3 to obtain standing and maxA of 2 to maintain standing. Pt able to maintain standing X 5-20 seconds with significant flexion posture, posterior lean, and L lean, and using GB in bathroom for LB dressing  No functional mobility completed on this date secondary to safety as pt requires max Ax2 to stand. Second Session:  Pt seated upright in recliner with chair alarm in place, pt positioned in different sling than AM session per Pricilla NOONAN, order. Pt is agreeable to OT/PT co-tx. Pt attempted a SPT from [facility] to [facility] with assistance of 2x--three unsuccessful attempts. Attempting to complete sit<>stand transition in [address] [place] [address] with assistance x2, 3x unsuccessful attempts. Pt dependent for functional transfer from recliner>WC>bed with kayli dependent. Pt then seated in wheelchair for UB ROM in elbow and wrist per MD orders.  Dynamometer measurements taken in Both hands IN pounds (R-16, 15, 18. L- 4,9,10). Noted decreased strength in LUE when compared to RUE. Pt completed on set of 5x in hand and fine motor strengthening tasks in both hands using yellow (5lb soft) theraputty: gross grasp and fine pinch. Pt educated on the purpose of therapeutic exercise, pt stated \"that is silly\". Pt's dtr also educated on the purpose and recommendation to complete 1x a day,7x per week. Pt required above assistance levels for bed mobility. Pt guarded and unable to lay flat at this time, pt required >5x pillows to position in bed. Pt left in bed with bed alarm, call light, and all needs met. Family member present.       Cognition  Overall Cognitive Status: Impaired  Arousal/Alterness: appropriate responses to stimuli  Following Commands: follows one step commands with repetition, follows one step commands with increased time  Attention Span: difficulty attending to directions, difficulty dividing attention  Memory: decreased recall of recent events, decreased short term memory, decreased long term memory  Safety Judgement: decreased awareness of need for assistance, decreased awareness of need for safety  Problem Solving: assistance required to generate solutions, assistance required to implement solutions, decreased awareness of errors, assistance required to identify errors made, assistance required to correct errors made  Insights: decreased awareness of deficits  Initiation: requires cues for all  Sequencing: requires cues for all  Comments: self-limiting, anxious  Orientation:    oriented to person, oriented to place, disoriented to time , and disoriented to situation  Pt forgot she had shoulder surgery at the end of PM session  Command Following:   accurately follows one step commands     Education  Barriers To Learning: cognition, hearing, emotional, and physical  Patient Education: patient educated on goals, OT role and benefits, plan of care, precautions, weight-bearing education, family education, transfer training, discharge recommendations  Learning Assessment:  patient verbalizes understanding, would benefit from continued reinforcement    Assessment  Activity Tolerance:poor d/t pain and anxiety  Impairments Requiring Therapeutic Intervention: decreased functional mobility, decreased ADL status, decreased safety awareness, decreased cognition, decreased endurance, decreased balance, increased pain, decreased posture  Prognosis: fair  Clinical Assessment: Pt is not at baseline level of function as she is dependent for ADLs with increased time and assist of 2-3 people. Pt requires assist of 2 for functional transfers. Pt demonstrates above barriers which limits her progress in therapy. Pt will benefit from skilled OT to maximize safety and occupational performance.   Safety Interventions: patient left in chair, chair alarm in place, call light within reach, gait belt, patient at risk for falls, nurse notified, and family/caregiver present    Plan  Frequency: 5 x/week, 90 min/day  Current Treatment Recommendations: strengthening, balance training, functional mobility training, transfer training, endurance training, patient/caregiver education, ADL/self-care training, IADL training, home management training, pain management, and safety education    Goals  Patient Goals: Go home   Short Term Goals:  Time Frame: 3 weeks  Patient will complete upper body ADL at minimal assistance   Patient will complete lower body ADL at minimal assistance   Patient will complete toileting at modified independent   Patient will complete self-feeding at Independent   Patient will complete grooming at Independent   Patient will complete functional transfers at modified independent   Patient will complete functional mobility at modified independent    -Goals not yet met this date        Therapy Session Time  First Therapy Time:   Individual Group Co-treatment   Time In    915   Time Out    1000 Minutes   45      Second Therapy Time:   Individual Group Co-treatment   Time In    1877   Time Out    1440   Minutes   55     Timed Code Treatment Minutes: 45 +55    Total Treatment Minutes:   100 minutes       Electronically Signed By: Erick Greene [id number]

## 2022-10-17 NOTE — PROGRESS NOTES
[facility] Orthopedic Surgery   Progress Note    CHIEF COMPLAINT/DIAGNOSIS: S/p left reverse total Shoulder Arthroplasty    SUBJECTIVE: Patient is sitting up in the chair with her daughter at bedside. Concern from patient/family and therapists that the ER immobilizer is not staying in appropriate position and is not being well-tolerated. Pain controlled. Patient has baseline peripheral neuropathy affecting both hands and feet. OBJECTIVE  Physical    VITALS:  BP (!) 142/89   Pulse 88   Temp 97.7 °F (36.5 °C)   Resp 16   Ht 5' 5\" (1.651 m)   Wt 163 lb (73.9 kg)   SpO2 98%   BMI 27.12 kg/m²     GENERAL: Alert and oriented x3, in no acute distress. MUSCULOSKELETAL: Able to flex and extend the wrist on the operative side without issue. INCISION:  Covered with post-op dressing; clean, dry and intact. ROM: left shoulder deferred. External rotation sling/immobilizer is in very awkward position. Sensory:  Intact to light touch in axillary, median radial, ulnar distributions. Vascular:   2+ radial pulses with brisk cap refill. Data    ALL MEDICATIONS HAVE BEEN REVIEWED    CBC:   Recent Labs     10/17/22  0522   WBC 6.8   HGB 8.8*   HCT 26.2*        BMP:   Recent Labs     10/15/22  0559 10/17/22  0522   * 128*   K 3.8 4.4   CL 97* 92*   CO2 24 24   BUN 14 8   CREATININE <0.5* <0.5*     INR:   No results for input(s): INR in the last 72 hours. Intra-Op films show stable reverse total shoulder arthroplasty with cemented humeral stem and tuberosity fixation. ASSESSMENT:  S/p left reverse total Shoulder Arthroplasty for fracture (10/12/2022), POD#5  Hyponatremia  Acute postop blood loss anemia as expected    PLAN:   Transitioned to standard sling. Given the patient's body habitus and posture, she cannot maintain the ER immobilizer in an appropriate position. Please continue to reinforce the patient keep her arm in front of her at all times.    - WB status:  NWB; continue sling - reviewed post op precautions. Exercises handout provided. Keep a pillow beneath the patient's elbow to keep the forearm in front of the body (do not allow the elbow to slide backwards onto the bed/chair). Loosen the sling 2x/day and have patient perform gentle elbow ROM, forearm rotation, and wrist ROM. NO shoulder ROM at all.    - DVT prophylaxis: ASA 81 mg twice daily x14 days; SCDs/ankle pumps/ambulation  - OT/PT  - Pain Control:  - Expected post op acute blood loss anemia: 8.8/26.2  - Dispo: per ARU    Follow-up with Dr. Zara Farah in approximately 2 weeks. Office# [phone]  No future appointments.     ADELINA Jain CNP  10/17/2022  1:29 PM

## 2022-10-18 LAB
CORTISOL - AM: 10.3 UG/DL (ref 4.3–22.4)
OSMOLALITY URINE: 659 MOSM/KG (ref 390–1070)
OSMOLALITY: 263 MOSM/KG (ref 280–301)
SODIUM URINE: 72 MMOL/L
TSH REFLEX FT4: 1.45 UIU/ML (ref 0.27–4.2)

## 2022-10-18 PROCEDURE — 6370000000 HC RX 637 (ALT 250 FOR IP): Performed by: PHYSICAL MEDICINE & REHABILITATION

## 2022-10-18 PROCEDURE — 1280000000 HC REHAB R&B

## 2022-10-18 PROCEDURE — 84300 ASSAY OF URINE SODIUM: CPT

## 2022-10-18 PROCEDURE — 97535 SELF CARE MNGMENT TRAINING: CPT

## 2022-10-18 PROCEDURE — 84443 ASSAY THYROID STIM HORMONE: CPT

## 2022-10-18 PROCEDURE — 6370000000 HC RX 637 (ALT 250 FOR IP): Performed by: INTERNAL MEDICINE

## 2022-10-18 PROCEDURE — 6360000002 HC RX W HCPCS: Performed by: PHYSICAL MEDICINE & REHABILITATION

## 2022-10-18 PROCEDURE — 83935 ASSAY OF URINE OSMOLALITY: CPT

## 2022-10-18 PROCEDURE — 97530 THERAPEUTIC ACTIVITIES: CPT

## 2022-10-18 PROCEDURE — 36415 COLL VENOUS BLD VENIPUNCTURE: CPT

## 2022-10-18 PROCEDURE — 82533 TOTAL CORTISOL: CPT

## 2022-10-18 RX ADMIN — TRAMADOL HYDROCHLORIDE 100 MG: 50 TABLET, FILM COATED ORAL at 05:34

## 2022-10-18 RX ADMIN — Medication 30 G: at 17:04

## 2022-10-18 RX ADMIN — SODIUM CHLORIDE TAB 1 GM 1 G: 1 TAB at 09:39

## 2022-10-18 RX ADMIN — SODIUM CHLORIDE TAB 1 GM 1 G: 1 TAB at 17:04

## 2022-10-18 RX ADMIN — ACETAMINOPHEN 1000 MG: 500 TABLET ORAL at 09:39

## 2022-10-18 RX ADMIN — STANDARDIZED SENNA CONCENTRATE AND DOCUSATE SODIUM 2 TABLET: 8.6; 5 TABLET ORAL at 09:39

## 2022-10-18 RX ADMIN — ACETAMINOPHEN 1000 MG: 500 TABLET ORAL at 21:25

## 2022-10-18 RX ADMIN — DIAZEPAM 1 MG: 2 TABLET ORAL at 14:31

## 2022-10-18 RX ADMIN — ACETAMINOPHEN 1000 MG: 500 TABLET ORAL at 14:32

## 2022-10-18 RX ADMIN — POLYETHYLENE GLYCOL 3350 17 G: 17 POWDER, FOR SOLUTION ORAL at 09:40

## 2022-10-18 RX ADMIN — CETIRIZINE HYDROCHLORIDE 5 MG: 10 TABLET, FILM COATED ORAL at 09:39

## 2022-10-18 RX ADMIN — ONDANSETRON 4 MG: 4 TABLET, ORALLY DISINTEGRATING ORAL at 14:40

## 2022-10-18 RX ADMIN — NIFEDIPINE 30 MG: 30 TABLET, EXTENDED RELEASE ORAL at 09:39

## 2022-10-18 RX ADMIN — ENOXAPARIN SODIUM 40 MG: 100 INJECTION SUBCUTANEOUS at 09:39

## 2022-10-18 ASSESSMENT — PAIN DESCRIPTION - LOCATION
LOCATION: ARM;SHOULDER;HAND
LOCATION: ARM;SHOULDER

## 2022-10-18 ASSESSMENT — PAIN DESCRIPTION - ORIENTATION
ORIENTATION: LEFT
ORIENTATION: LEFT

## 2022-10-18 ASSESSMENT — PAIN SCALES - WONG BAKER
WONGBAKER_NUMERICALRESPONSE: 0

## 2022-10-18 ASSESSMENT — PAIN DESCRIPTION - PAIN TYPE: TYPE: SURGICAL PAIN

## 2022-10-18 ASSESSMENT — PAIN DESCRIPTION - DESCRIPTORS
DESCRIPTORS: OTHER (COMMENT)
DESCRIPTORS: ACHING;SHOOTING

## 2022-10-18 ASSESSMENT — PAIN SCALES - GENERAL
PAINLEVEL_OUTOF10: 4
PAINLEVEL_OUTOF10: 5
PAINLEVEL_OUTOF10: 0

## 2022-10-18 ASSESSMENT — PAIN DESCRIPTION - FREQUENCY: FREQUENCY: CONTINUOUS

## 2022-10-18 NOTE — PLAN OF CARE
Problem: Discharge Planning  Goal: Discharge to home or other facility with appropriate resources  10/17/2022 2329 by Ester Angel RN  Outcome: Progressing     Problem: Skin/Tissue Integrity  Goal: Absence of new skin breakdown  Description: 1. Monitor for areas of redness and/or skin breakdown  2. Assess vascular access sites hourly  3. Every 4-6 hours minimum:  Change oxygen saturation probe site  4. Every 4-6 hours:  If on nasal continuous positive airway pressure, respiratory therapy assess nares and determine need for appliance change or resting period.   10/17/2022 2329 by Ester Angel RN  Outcome: Progressing     Problem: Safety - Adult  Goal: Free from fall injury  10/17/2022 2329 by Ester Angel RN  Outcome: Progressing     Problem: ABCDS Injury Assessment  Goal: Absence of physical injury  10/17/2022 2329 by Ester Angel RN  Outcome: Progressing     Problem: Pain  Goal: Verbalizes/displays adequate comfort level or baseline comfort level  10/17/2022 2329 by Ester Angel RN  Outcome: Progressing     Problem: Chronic Conditions and Co-morbidities  Goal: Patient's chronic conditions and co-morbidity symptoms are monitored and maintained or improved  10/17/2022 2329 by Ester Angel RN  Outcome: Progressing

## 2022-10-18 NOTE — PROGRESS NOTES
[facility]   Nephrology progress Note  [phone]   [facility]. [employer]           SUMMARY :  We are following this patient for hyponatremia  80year-old female with past medical history of chronic hyponatremia, hypertension, CHF, presented to the hospital after a fall. At that time her serum sodium was 126. She sees my partner Dr. Jailyn Pedersen  for chronic hyponatremia. SUBJECTIVE:   Patient progress reviewed. The patient has no new complaints, her daughter is by bedside    Physical Exam:    VITALS:  /67   Pulse 74   Temp 97.7 °F (36.5 °C) (Oral)   Resp 18   Ht 5' 5\" (1.651 m)   Wt 163 lb (73.9 kg)   SpO2 95%   BMI 27.12 kg/m²   BLOOD PRESSURE RANGE:  Systolic (07MQU), VBQ:727 , Min:115 , ZME:922   ; Diastolic (80UUH), LFD:35, Min:67, Max:84    24HR INTAKE/OUTPUT:    Intake/Output Summary (Last 24 hours) at 10/18/2022 1101  Last data filed at 10/17/2022 2210  Gross per 24 hour   Intake 120 ml   Output 225 ml   Net -105 ml         Gen:  alert, oriented x 2, hard of hearing  PERRL , EOM +  Pallor +, No icterus  JVP not raised   CV: RRR no murmur or rub . Lungs:B/ L air entry, Normal breath sounds   Abd: soft, bowel sounds + , No organomegaly   Ext: No edema, no cyanosis  Skin: Warm.   No rash  Neuro: nonfocal.      DATA:    CBC with Differential:    Lab Results   Component Value Date/Time    WBC 6.8 10/17/2022 05:22 AM    RBC 3.01 10/17/2022 05:22 AM    HGB 8.8 10/17/2022 05:22 AM    HCT 26.2 10/17/2022 05:22 AM     10/17/2022 05:22 AM    MCV 87.1 10/17/2022 05:22 AM    MCH 29.2 10/17/2022 05:22 AM    MCHC 33.5 10/17/2022 05:22 AM    RDW 14.0 10/17/2022 05:22 AM    LYMPHOPCT 15.3 10/10/2022 02:00 PM    MONOPCT 4.2 10/10/2022 02:00 PM    BASOPCT 0.6 10/10/2022 02:00 PM    MONOSABS 0.4 10/10/2022 02:00 PM    LYMPHSABS 1.5 10/10/2022 02:00 PM    EOSABS 0.0 10/10/2022 02:00 PM    BASOSABS 0.1 10/10/2022 02:00 PM     CMP:    Lab Results   Component Value Date/Time    NA 127 10/17/2022 09:24 PM    K 4.0 10/17/2022 09:24 PM    K 4.1 10/10/2022 02:00 PM    CL 93 10/17/2022 09:24 PM    CO2 27 10/17/2022 09:24 PM    BUN 12 10/17/2022 09:24 PM    CREATININE <0.5 10/17/2022 09:24 PM    GFRAA >60 10/17/2022 05:22 AM    AGRATIO 1.2 10/10/2022 02:00 PM    LABGLOM >60 10/17/2022 09:24 PM    GLUCOSE 93 10/17/2022 09:24 PM    PROT 6.9 10/10/2022 02:00 PM    LABALBU 3.8 10/10/2022 02:00 PM    CALCIUM 9.0 10/17/2022 09:24 PM    BILITOT 0.4 10/10/2022 02:00 PM    ALKPHOS 69 10/10/2022 02:00 PM    AST 16 10/10/2022 02:00 PM    ALT 11 10/10/2022 02:00 PM     Magnesium:    Lab Results   Component Value Date/Time    MG 2.00 10/15/2022 05:59 AM     Phosphorus:  No results found for: PHOS  Uric Acid:  No results found for: Hope Chadwickse  U/A:    Lab Results   Component Value Date/Time    COLORU Yellow 10/10/2022 07:00 PM    PROTEINU Negative 10/10/2022 07:00 PM    PHUR 6.0 10/10/2022 07:00 PM    WBCUA 2 10/10/2022 07:00 PM    RBCUA 1 10/10/2022 07:00 PM    BACTERIA None Seen 10/10/2022 07:00 PM    CLARITYU Clear 10/10/2022 07:00 PM    SPECGRAV 1.025 10/10/2022 07:00 PM    LEUKOCYTESUR SMALL 10/10/2022 07:00 PM    UROBILINOGEN 1.0 10/10/2022 07:00 PM    BILIRUBINUR Negative 10/10/2022 07:00 PM    BLOODU Negative 10/10/2022 07:00 PM    GLUCOSEU Negative 10/10/2022 07:00 PM         IMPRESSION/RECOMMENDATIONS:      Diagnosis and Plan     Hyponatremia:  SIADH  Na lower at 127  Usodium 72,  urine osmolality 659   plasma osmolality 263  AM cortisol 10.3, TSH 1.45   Start urea  Hypertension  Left shoulder fracture s/p reverse left shoulder arthroplasty with tuberosity fixation on 10/12/2022:    Marisabel Guido MD

## 2022-10-18 NOTE — PROGRESS NOTES
[facility] [facility]   Phone: [phone]    Physical Therapy    [] Initial Evaluation            [x] Daily Treatment Note         [] Discharge Summary      Patient: Margaret Black   : 1934   MRN: [id number]   Date of Service:  10/18/2022  Admitting Diagnosis: Left supracondylar humerus fracture, sequela  Current Admission Summary: 49-year-old female with PMHx of CHF, hypertension, chronic hyponatremia and SIADH presented after sustaining a fall while going to the bathroom. Patient reported pain in her left shoulder and right hip. X-ray revealed a communicated fracture of left humeral neck with humeral shaft displaced. CT head negative for any acute intracranial pathology. Patient taken to surgery by Dr. Holli Galeano on 10/12 for a repair of her Left comminuted and displaced proximal humerus fracture with a Left reverse shoulder arthroplasty with tuberosity fixation. Post op, patient started in therapies, and patient is able to tolerate 3 hours of therapy 5 days per week and is medically appropriate for rehab on the Acute Rehabilitation Unit. Patient has regular Medicare insurance. She is agreeable to rehab unit treatment. Patient lives at home with her daughter in a two-level house, but she does have a stair lift system in place. Past Medical History:  has a past medical history of CHF (congestive heart failure) ([address] and Hypertension. Past Surgical History:  has a past surgical history that includes Cholecystectomy;  section; and shoulder surgery (Left, 10/12/2022). Discharge Recommendations:  To be determined pending progress  DME Required For Discharge:  to be determined pending progress, likely will need wheelchair   Precautions/Restrictions: high fall risk  Weight Bearing Restrictions: non weight bearing  [] Right Upper Extremity  [x] Left Upper Extremity [] Right Lower Extremity  [] Left Lower Extremity     Required Braces/Orthotics: GOMEZ abductor sling   [] Right  [x] Left  Positional Restrictions:no PROM or AROM L shoulder movement, okay for AROM elbow/forearm, wrist and hand    Lives With: daughter               Type of Home: house  Home Layout: two level, 1/2 bath on main level, bedroom/bathroom upstairs, chair lift to 2nd level  Home Access:  2 step to enter without rails , has portable ramp   Bathroom Layout: tub/shower unit, walk in shower, uses WIS  Bathroom Equipment: grab bars in shower, shower chair, toilet raiser  Toilet Height: standard height  Home Equipment: rolling walker, manual wheelchair, reacher, oxygen only at night; purewick at nights  Transfer Assistance: Independent without use of device  Ambulation Assistance:modified independent with use of FWW in house, w/c in community  ADL Assistance: independent with all ADL's  IADL Assistance: requires assistance with all homemaking tasks, requires assistance for medication management  Active :        [] Yes                 [x] No daughter provides transportation  Hand Dominance: [] Left                 [x] Right  Hobbies: enjoys reading the paper, watching TV  Recent Falls: 1 recent fall in last 6 months prior to this fall that lead to this admission           Subjective  General: Patient is supine in bed upon PT/OT arrival, pt states she slept well, daughter present  Pain: 7/10.   Location: L shoulder  Pain Interventions: pain medication in place prior to arrival       Functional Mobility  Bed Mobility  Supine to Sit: 2 person assistance with modA of 2   Rolling Right: maximum assistance  Scootin person assistance with max A x 2   Comments:  Transfers  Sit to stand transfer: 2 person assistance with max A x 2   Stand to sit transfer: 2 person assistance with max A x 2   Stand pivot transfer: 2 person assistance with max A x 2-dependent assist of 2   Toilet transfer: 2 person assistance with max A x 2   Comments:  Ambulation  Ambulation not tested on this date secondary to patient requires maximal assistance to stand. Distance:   Gait Mechanics:   Comments:    Stair Mobility  Stair mobility not completed on this date. Comments:  Wheelchair Mobility:  No w/c mobility completed on this date. Comments:  Balance  Static Sitting Balance: fair (+): maintains balance at SBA/supervision without use of UE support  Dynamic Sitting Balance: fair (+): maintains balance at SBA/supervision without use of UE support  Static Standing Balance: poor (-): requires max (A) to maintain balance  Dynamic Standing Balance: poor (-): requires max (A) to maintain balance  Comments:    Patient requires max A to maintain standing balance, max A x 2 for transfers    Other Therapeutic Interventions    1st session:  Pt able to sit unsupported EOB with SBA. Performed bed mobility and SPT to w/c as documented above. OT assisted with dressing in w/c with facilitation for anterior lean for UB dressing and maxAof 2 for STS and standing balance + assist to pull up pants for LB dressing. Performed STS at ballet bar with maxA of 2 for first attempt. Attempted 2 more times with pt unable to come to full stand with maxA of 3 with pt pushing posteriorly. Pt required frequent cues not to lean on L elbow on lap while sitting in w/c. Pt returned to room and returned to recliner with dependent A of 2 for SPT with bobath technique. Pt remained in recliner with alarm on and all needs in reach with daughter present. Second session: Pt in recliner on arrival.  Performed bobath transfer from recliner to w/c with maxA of 2. Attempted standing at betts rail with RUE with maxA of 3. Pt unable to obtain upright standing X 3 trials. Performed standing in standing frame X 3 minutes with modA for upright posture. Attempted to increase activity to promote upright posture, but pt started laying on tray of standing frame. Pt kept stating her legs hurt, but did not complain of shoulder pain.   Pt required frequent cues to maintain NWB of L UE.  Pt returned to recliner via bobath transfer with maxA of 2. Pt remained in recliner with alarm on and all needs in reach with daughter present. Functional Outcomes                 Cognition  Overall Cognitive Status: Impaired  Arousal/Alterness: appropriate responses to stimuli  Following Commands: follows one step commands with repetition, follows one step commands with increased time  Safety Judgement: decreased awareness of need for assistance  Initiation: requires cues for some  Orientation:    alert and oriented x 4  Command Following:   impaired--inconsistent with participation and command following    Education  Barriers To Learning: cognition and hearing  Patient Education: patient educated on goals, PT role and benefits, plan of care, weight-bearing education, general safety, functional mobility training, transfer training, discharge recommendations  Learning Assessment:  patient verbalizes understanding, would benefit from continued reinforcement    Assessment  Activity Tolerance: limited due to increased pain with any mobility  Impairments Requiring Therapeutic Intervention: decreased functional mobility, decreased strength, decreased safety awareness, decreased cognition, decreased endurance, decreased balance  Prognosis: guarded  Clinical Assessment: Patient continues to require significant assistance for all mobility and demonstrates anxiety and fear regarding mobility due to L shoulder sx and a large fear of falling in all directions. Pt unable to obtain or maintain upright posture, even with maxA of 3. Patient requires max A x 2-3 for all mobility and will benefit from continued therapy services to improve independence.    Safety Interventions: patient left in chair, chair alarm in place, call light within reach, and nurse notified    Plan  Frequency: 5 x/week, 90 min/day  Current Treatment Recommendations: strengthening, ROM, balance training, functional mobility training, transfer training, gait training, stair training, endurance training, patient/caregiver education, safety education, and equipment evaluation/education    Goals  Patient Goals: to go home and be independent    Short Term Goals:  Time Frame: 2 weeks   Patient will complete supine<>sit transfer at moderate assistance   Patient will complete lateral transfer at moderate assistance   Patient will complete manual w/c propulsion 10 ft at moderate assistance    To be met in 3 weeks:  Patient will complete supine<>sit transfer at min A I  Patient will complete stand step transfer at min A  Patient will complete manual w/c propulsion 100 ft at min A  Patient will tolerate ambulation x 10' with use of LRAD with mod A     Therapy Session Time      Individual Group Co-treatment   Time In      730   Time Out      825   Minutes      55       Second Session Therapy Time:   Individual Concurrent Group Co-treatment   Time In        1330   Time Out        1410   Minutes        40         Timed Code Treatment Minutes:  55+40      Total Treatment Minutes:  95      Electronically Signed By: Amanda Hernandez PT

## 2022-10-18 NOTE — PLAN OF CARE
Problem: Discharge Planning  Goal: Discharge to home or other facility with appropriate resources  10/18/2022 1100 by Yessy Hernandez RN  Outcome: Progressing  Flowsheets (Taken 10/18/2022 0930)  Discharge to home or other facility with appropriate resources: Arrange for needed discharge resources and transportation as appropriate     Problem: Skin/Tissue Integrity  Goal: Absence of new skin breakdown  Description: 1. Monitor for areas of redness and/or skin breakdown  2. Assess vascular access sites hourly  3. Every 4-6 hours minimum:  Change oxygen saturation probe site  4. Every 4-6 hours:  If on nasal continuous positive airway pressure, respiratory therapy assess nares and determine need for appliance change or resting period.   10/18/2022 1100 by Yessy Hernandez RN  Outcome: Progressing     Problem: Safety - Adult  Goal: Free from fall injury  10/18/2022 1100 by Yessy Hernandez RN  Outcome: Progressing     Problem: ABCDS Injury Assessment  Goal: Absence of physical injury  10/18/2022 1100 by Yessy Hernandez RN  Outcome: Progressing     Problem: Pain  Goal: Verbalizes/displays adequate comfort level or baseline comfort level  10/18/2022 1100 by Yessy Hernandez RN  Outcome: Progressing  Flowsheets (Taken 10/18/2022 0930)  Verbalizes/displays adequate comfort level or baseline comfort level: Encourage patient to monitor pain and request assistance     Problem: Nutrition Deficit:  Goal: Optimize nutritional status  Outcome: Progressing     Problem: Chronic Conditions and Co-morbidities  Goal: Patient's chronic conditions and co-morbidity symptoms are monitored and maintained or improved  10/18/2022 1100 by Yessy Hernandez RN  Outcome: Progressing  Flowsheets (Taken 10/18/2022 0930)  Care Plan - Patient's Chronic Conditions and Co-Morbidity Symptoms are Monitored and Maintained or Improved: Monitor and assess patient's chronic conditions and comorbid symptoms for stability, deterioration, or improvement

## 2022-10-18 NOTE — PROGRESS NOTES
[facility]   Phone: [phone]    Occupational Therapy    [] Initial Evaluation            [x] Daily Treatment Note         [] Discharge Summary      Patient: Rafa Link   : 1934   MRN: [id number]   Date of Service:  10/18/2022    Admitting Diagnosis:  Left supracondylar humerus fracture, sequela  Current Admission Summary:  72-year-old female with PMHx of CHF, hypertension, chronic hyponatremia and SIADH presented after sustaining a fall while going to the bathroom. Patient reported pain in her left shoulder and right hip. X-ray revealed a communicated fracture of left humeral neck with humeral shaft displaced. CT head negative for any acute intracranial pathology. Patient taken to surgery by Dr. Do Bryson on 10/12 for a repair of her Left comminuted and displaced proximal humerus fracture with a Left reverse shoulder arthroplasty with tuberosity fixation. Post op, patient started in therapies, and patient is able to tolerate 3 hours of therapy 5 days per week and is medically appropriate for rehab on the Acute Rehabilitation Unit. Patient has regular Medicare insurance. She is agreeable to rehab unit treatment. Patient lives at home with her daughter in a two-level house, but she does have a stair lift system in place. Past Medical History:  has a past medical history of CHF (congestive heart failure) ([address] and Hypertension. Past Surgical History:  has a past surgical history that includes Cholecystectomy;  section; and shoulder surgery (Left, 10/12/2022).     Discharge Recommendations:24/7 Assist and SUP, HHOT S3    DME Required For Discharge:  may need hip kit for LB ADLs    Precautions/Restrictions: high fall risk, contact precautions, weight bearing, ROM restrictions  Weight Bearing Restrictions: non weight bearing  [] Right Upper Extremity  [x] Left Upper Extremity [] Right Lower Extremity  [] Left Lower Extremity     Required Braces/Orthotics:  abductor sling   [] Right  [x] Left  Positional Restrictions: no PROM or AROM L shoulder movement, okay for AROM elbow/forearm, wrist and hand  Comment: per Sharron Burk note on 10/17/22:   - WB status:  NWB; continue sling - reviewed post op precautions. Exercises handout provided. Keep a pillow beneath the patient's elbow to keep the forearm in front of the body (do not allow the elbow to slide backwards onto the bed/chair). Loosen the sling 2x/day and have patient perform gentle elbow ROM, forearm rotation, and wrist ROM. NO shoulder ROM at all. Prior Level of Function  Lives With: daughter               Type of Home: house  Home Layout: two level, 1/2 bath on main level, bedroom/bathroom upstairs, chair lift to 2nd level  Home Access:  2 step to enter without rails , has portable ramp   Bathroom Layout: tub/shower unit, walk in shower, uses WIS  Bathroom Equipment: grab bars in shower, shower chair, toilet raiser  Toilet Height: standard height  Home Equipment: rolling walker, manual wheelchair, reacher, oxygen only at night; purewick at nights  Transfer Assistance: Independent without use of device  Ambulation Assistance:modified independent with use of FWW in house, w/c in community  ADL Assistance: independent with all ADL's  IADL Assistance: requires assistance with all homemaking tasks, requires assistance for medication management  Active :        [] Yes                 [x] No daughter provides transportation  Hand Dominance: [] Left                 [x] Right  Hobbies: enjoys reading the paper, watching TV  Recent Falls: 1 recent fall in last 6 months prior to this fall that lead to this admission      Subjective  General: Pt supine in bed upon arival, agreeable to OT/PT co tx. Pt's dtr present throughout session. OT donned/doffed yellow isolation gowns per contact precautions. Pain: 7/10.   Location: L shoulder  Pain Interventions: pain medication in place prior to arrival        Activities of Daily Living  Basic Activities of Daily Living  Grooming: moderate assistance  Grooming Comments: max A to wash face, set-up to dry the face  Upper Extremity Dressing: dependent requires verbal cueing Increased time to complete task Comment: pt very fearful of moving LUE inappropriately  Lower Extremity Dressing: dependent requires verbal cueing Increased time to complete task  Dressing Comments: max A x2 for standing with assistance of another to pull up over hips  Toileting Comments: purewick, incontinent of urine  General Comments: C/o pain and nausea throughout  Instrumental Activities of Daily Living  No IADL completed on this date. Functional Mobility  Bed Mobility  Supine to Sit: 2 person assistance with max A of 2   Scootin person assistance with max A of 2   Transfers  Sit to stand transfer:dependent assistance, 2 person assistance with max A of 2-3 , used the hand rail on the bed for STS for LB dressing, @ bars on mirror to facilitate balance  Stand to sit transfer: dependent assistance, 2 person assistance with max A of 2 , used the hand rail on the bed for STS for LB dressing, @ bars on mirror to facilitate balance  Bed / Chair transfer: dependent assistance, 2 person assistance with max Ax2 . Bed / Chair comments: trail 1: squat pivot t/f from bed to the [address] to the L trail 2: stand pivot t/f fromWC to recliner, pt with forward flexed posture  Comment: at end of session, pt refusing to continue to complete STS. Noted pt pushing back/ refusing to actively participate, rather pt pushing posterorly in chair. Pt is very fearful. Functional Mobility  Sitting Balance: stand by assistance. Sitting Balance Comment: Seated on EOB, requires frequent cues to not lean on L elbow in lap or on arm rest, arm rest of wheelchair removed to ensure adherence to surgical precautions  Standing Balance: 2 person assistance with max A x2 .     Standing Balance Comment: @ // bars in mirror: Heavy posterior L sided lean with all activities, unable to self correct despite verbal cues. Performed STS with bar to pull up using RUE with maxA of 2 for first attempt. Attempted 2 more times, pt pushing posteriorly and stated she refused to complete additional stands despite max verbal cues of encouragement and education on therapy POC  No functional mobility completed on this date secondary to safety as pt requires max Ax2 to stand. Second Session:  Upon OT arrival, pt seated upright in recliner. Agreeable to OT/PT co-treat with max cues of encouragement. Pt requires co-tx secondary to complexity of condition, decreased activity tolerance and increased assistance for ADL/mobility. Pt benefits from x 2 skilled hands to provide increased cues/feedback and promote improved safety and performance with ADLs. Pt completed SPT t/f from recliner<>WC using bobath technique with max Ax2 and max verbal cues for sequencing. Pt attempting to stand using hand rail on hallway from [facility] with max Ax3, pt unsuccessful with standing at this time. Pt completed standing in standing frame ~3 min with max A to mod A for posture while pt used RUE to complete fine motor task. Pt stating, \"I can't\", \"I'm going to fall\", and \"I need to sit down\" during stance with max encouragement from therapist and dtr to participate. Pt then lay on the top of table rather than standing tall per verbal and tactile cues.     Cognition  Overall Cognitive Status: Impaired  Arousal/Alterness: appropriate responses to stimuli  Following Commands: follows one step commands with repetition, follows one step commands with increased time  Attention Span: difficulty attending to directions, difficulty dividing attention  Memory: decreased recall of recent events, decreased short term memory, decreased long term memory  Safety Judgement: decreased awareness of need for assistance, decreased awareness of need for safety  Problem Solving: assistance required to generate solutions, assistance required to implement solutions, decreased awareness of errors, assistance required to identify errors made, assistance required to correct errors made  Insights: decreased awareness of deficits  Initiation: requires cues for all  Sequencing: requires cues for all  Comments: self-limiting, anxious, fearful  Orientation:    oriented to person, oriented to place, disoriented to time , and disoriented to situation  Command Following:   impaired--inconsistent with participation and command following     Education  Barriers To Learning: cognition, hearing, emotional, and physical  Patient Education: patient educated on goals, OT role and benefits, plan of care, precautions, weight-bearing education, family education, transfer training, discharge recommendations  Learning Assessment:  patient verbalizes understanding, would benefit from continued reinforcement    Assessment  Activity Tolerance:poor d/t pain and anxiety/fear  Impairments Requiring Therapeutic Intervention: decreased functional mobility, decreased ADL status, decreased safety awareness, decreased cognition, decreased endurance, decreased balance, increased pain, decreased posture  Prognosis: fair  Clinical Assessment: Pt is not at baseline level of function as she is dependent for ADLs with increased time and assist of 2-3 people. Pt requires assist of 2 for functional transfers. Pt demonstrates above barriers which limits her progress in therapy. Pt will benefit from skilled OT to maximize safety and occupational performance.   Safety Interventions: patient left in chair, chair alarm in place, call light within reach, gait belt, patient at risk for falls, nurse notified, and family/caregiver present    Plan  Frequency: 5 x/week, 90 min/day  Current Treatment Recommendations: strengthening, balance training, functional mobility training, transfer training, endurance training, patient/caregiver education, ADL/self-care training, IADL training, home management training, pain management, and safety education    Goals  Patient Goals: Go home   Short Term Goals:  Time Frame: 3 weeks  Patient will complete upper body ADL at minimal assistance   Patient will complete lower body ADL at minimal assistance   Patient will complete toileting at modified independent   Patient will complete self-feeding at Independent   Patient will complete grooming at Independent   Patient will complete functional transfers at modified independent   Patient will complete functional mobility at modified independent    -Goals not yet met this date        Therapy Session Time  First Therapy Time:   Individual Group Co-treatment   Time In    730   Time Out    825   Minutes   55      Second Therapy Time:   Individual Group Co-treatment   Time In   1330   Time Out   1410   Minutes   40     Timed Code Treatment Minutes: 02+09    Total Treatment Minutes: 95 minutes       Electronically Signed By: Adrianne Olson [address] [place] [address]

## 2022-10-18 NOTE — PROGRESS NOTES
Assessment complete. VSS. Pt A&O x4, scheduled meds given. Purwick in place. The care plan and education has been reviewed and mutually agreed upon with the patient. Patient remains free from falls or accidental injury. Room free from clutter. All fall precautions in place. Bed in lowest position with wheels locked and alarm on, call light and belongings within reach, and door open.

## 2022-10-18 NOTE — PROGRESS NOTES
Lauren Kraft  10/18/2022  [id number]    Chief Complaint: Left supracondylar humerus fracture, sequela    Subjective:   No overnight events. Feels nauseated. States that it's likely 2/2 low glucoses. Labs reviewed. Glucose 93. She reports feeling poor when glucose is under 100. ROS: No CP, SOB, dyspnea    Objective:  Patient Vitals for the past 24 hrs:   BP Temp Temp src Pulse Resp SpO2   10/18/22 0604 -- -- -- -- 18 --   10/18/22 0534 -- -- -- -- 18 --   10/17/22 2008 -- -- -- -- 16 --   10/17/22 1930 (!) 141/84 97.9 °F (36.6 °C) Oral 76 18 94 %       Gen: No distress, pleasant. Resting in bedside chair  HEENT: Normocephalic, atraumatic. CV: Regular rate and rhythm. No MRG   Resp: No respiratory distress. CTAB   Abd: Soft, nontender nondistended  Ext: No edema. LUE in sling with post op dressing  Neuro: Alert, oriented, appropriately interactive. Laboratory data: Available via EMR. Therapy progress:    PT    Supine to Sit: Dependent  Sit to Supine: Dependent   Sit to Stand: Dependent  Chair/Bed to Chair Transfer: Dependent  Car Transfer:    Ambulation 10 ft:    Ambulation 50 ft:    Ambulation 150 ft:    Stairs - 1 Step:    Stairs - 4 Step:    Stairs - 12 Step:      OT    Eating:    Oral Hygiene: Supervision or touching assistance  Bathing: Substantial/maximal assistance  Upper Body Dressing: Dependent  Lower Body Dressing: Dependent  Toilet Transfer: Dependent  Toilet Hygiene: Dependent    Speech Therapy         Body mass index is 27.12 kg/m². Assessment:  Patient Active Problem List   Diagnosis    Closed displaced fracture of surgical neck of humerus, initial encounter    Osteoporosis with current pathological fracture, initial encounter    Hyponatremia    Left supracondylar humerus fracture, sequela       Plan:   Displaced left proximal humeral fracture- Left reverse shoulder arthroplasty on 10/12 with Dr Yesy Conner. NWB LUE. Continue sling.       Acute on chronic hyponatremia-Nephrology on board, restarted back on salt tab. Fluid restriction. Hypertension: nifedipine XL     Bowel: Per protocol  Bladder: Per protocol  Sleep: Trazodone PRN  Pain: tylenol, scheduled, tramadol PRN  DVT PPx: lovenox  JAIRO: QUYNH Gonsalez MD 10/18/2022, 8:39 AM    * This document was created using dictation software. While all precautions were taken to ensure accuracy, errors may have occurred. Please disregard any typographical errors.

## 2022-10-19 LAB
ANION GAP SERPL CALCULATED.3IONS-SCNC: 12 MMOL/L (ref 3–16)
BUN BLDV-MCNC: 13 MG/DL (ref 7–20)
CALCIUM SERPL-MCNC: 9.1 MG/DL (ref 8.3–10.6)
CHLORIDE BLD-SCNC: 96 MMOL/L (ref 99–110)
CO2: 24 MMOL/L (ref 21–32)
CREAT SERPL-MCNC: <0.5 MG/DL (ref 0.6–1.2)
GFR SERPL CREATININE-BSD FRML MDRD: >60 ML/MIN/{1.73_M2}
GLUCOSE BLD-MCNC: 108 MG/DL (ref 70–99)
GLUCOSE BLD-MCNC: 119 MG/DL (ref 70–99)
HCT VFR BLD CALC: 25.7 % (ref 36–48)
HEMOGLOBIN: 8.6 G/DL (ref 12–16)
MCH RBC QN AUTO: 29 PG (ref 26–34)
MCHC RBC AUTO-ENTMCNC: 33.4 G/DL (ref 31–36)
MCV RBC AUTO: 86.6 FL (ref 80–100)
PDW BLD-RTO: 14.4 % (ref 12.4–15.4)
PERFORMED ON: ABNORMAL
PLATELET # BLD: 165 K/UL (ref 135–450)
PMV BLD AUTO: 6.8 FL (ref 5–10.5)
POTASSIUM SERPL-SCNC: 4 MMOL/L (ref 3.5–5.1)
RBC # BLD: 2.97 M/UL (ref 4–5.2)
SODIUM BLD-SCNC: 132 MMOL/L (ref 136–145)
WBC # BLD: 5.9 K/UL (ref 4–11)

## 2022-10-19 PROCEDURE — 97530 THERAPEUTIC ACTIVITIES: CPT

## 2022-10-19 PROCEDURE — 80048 BASIC METABOLIC PNL TOTAL CA: CPT

## 2022-10-19 PROCEDURE — 36415 COLL VENOUS BLD VENIPUNCTURE: CPT

## 2022-10-19 PROCEDURE — 1280000000 HC REHAB R&B

## 2022-10-19 PROCEDURE — 97535 SELF CARE MNGMENT TRAINING: CPT

## 2022-10-19 PROCEDURE — 6360000002 HC RX W HCPCS: Performed by: PHYSICAL MEDICINE & REHABILITATION

## 2022-10-19 PROCEDURE — 85027 COMPLETE CBC AUTOMATED: CPT

## 2022-10-19 PROCEDURE — 6370000000 HC RX 637 (ALT 250 FOR IP): Performed by: PHYSICAL MEDICINE & REHABILITATION

## 2022-10-19 PROCEDURE — 6370000000 HC RX 637 (ALT 250 FOR IP): Performed by: INTERNAL MEDICINE

## 2022-10-19 RX ORDER — SENNA AND DOCUSATE SODIUM 50; 8.6 MG/1; MG/1
2 TABLET, FILM COATED ORAL 2 TIMES DAILY PRN
Status: DISCONTINUED | OUTPATIENT
Start: 2022-10-19 | End: 2022-10-25 | Stop reason: HOSPADM

## 2022-10-19 RX ADMIN — DIAZEPAM 1 MG: 2 TABLET ORAL at 09:57

## 2022-10-19 RX ADMIN — ACETAMINOPHEN 1000 MG: 500 TABLET ORAL at 09:06

## 2022-10-19 RX ADMIN — NIFEDIPINE 30 MG: 30 TABLET, EXTENDED RELEASE ORAL at 09:06

## 2022-10-19 RX ADMIN — TRAMADOL HYDROCHLORIDE 100 MG: 50 TABLET, FILM COATED ORAL at 14:24

## 2022-10-19 RX ADMIN — ONDANSETRON 4 MG: 4 TABLET, ORALLY DISINTEGRATING ORAL at 05:12

## 2022-10-19 RX ADMIN — ENOXAPARIN SODIUM 40 MG: 100 INJECTION SUBCUTANEOUS at 09:07

## 2022-10-19 RX ADMIN — ACETAMINOPHEN 1000 MG: 500 TABLET ORAL at 21:44

## 2022-10-19 RX ADMIN — Medication 30 G: at 09:57

## 2022-10-19 RX ADMIN — TRAMADOL HYDROCHLORIDE 50 MG: 50 TABLET ORAL at 07:54

## 2022-10-19 RX ADMIN — TRAMADOL HYDROCHLORIDE 50 MG: 50 TABLET ORAL at 05:10

## 2022-10-19 RX ADMIN — CETIRIZINE HYDROCHLORIDE 5 MG: 10 TABLET, FILM COATED ORAL at 09:06

## 2022-10-19 RX ADMIN — TRAMADOL HYDROCHLORIDE 50 MG: 50 TABLET ORAL at 19:07

## 2022-10-19 RX ADMIN — ACETAMINOPHEN 1000 MG: 500 TABLET ORAL at 14:59

## 2022-10-19 RX ADMIN — TRAZODONE HYDROCHLORIDE 50 MG: 50 TABLET ORAL at 23:13

## 2022-10-19 ASSESSMENT — PAIN DESCRIPTION - FREQUENCY
FREQUENCY: CONTINUOUS

## 2022-10-19 ASSESSMENT — PAIN DESCRIPTION - DESCRIPTORS
DESCRIPTORS: ACHING;SHARP
DESCRIPTORS: ACHING;SHARP
DESCRIPTORS: ACHING
DESCRIPTORS: ACHING;SHARP
DESCRIPTORS: ACHING

## 2022-10-19 ASSESSMENT — PAIN - FUNCTIONAL ASSESSMENT

## 2022-10-19 ASSESSMENT — PAIN DESCRIPTION - ORIENTATION
ORIENTATION: LEFT

## 2022-10-19 ASSESSMENT — PAIN SCALES - WONG BAKER
WONGBAKER_NUMERICALRESPONSE: 0
WONGBAKER_NUMERICALRESPONSE: 0

## 2022-10-19 ASSESSMENT — PAIN SCALES - GENERAL
PAINLEVEL_OUTOF10: 5
PAINLEVEL_OUTOF10: 4
PAINLEVEL_OUTOF10: 5
PAINLEVEL_OUTOF10: 4
PAINLEVEL_OUTOF10: 5
PAINLEVEL_OUTOF10: 6
PAINLEVEL_OUTOF10: 5
PAINLEVEL_OUTOF10: 4
PAINLEVEL_OUTOF10: 4
PAINLEVEL_OUTOF10: 7
PAINLEVEL_OUTOF10: 4

## 2022-10-19 ASSESSMENT — PAIN DESCRIPTION - LOCATION
LOCATION: ARM
LOCATION: ARM;SHOULDER
LOCATION: ARM

## 2022-10-19 ASSESSMENT — PAIN DESCRIPTION - ONSET
ONSET: ON-GOING

## 2022-10-19 ASSESSMENT — PAIN DESCRIPTION - PAIN TYPE
TYPE: ACUTE PAIN;SURGICAL PAIN
TYPE: SURGICAL PAIN;ACUTE PAIN

## 2022-10-19 NOTE — PATIENT CARE CONFERENCE
[facility]  Inpatient Rehabilitation  Weekly Team Conference Note    Patient Name: Lorna Katz        MRN: [id number]    : 1934  (80 y.o.)  Gender: female           The team conference for this patient was held on 10/20/22 at 11:00am and led by:  Maru Yousif MD    CASE MANAGEMENT:  Assessment:   Patient lives at home with daughter. Daughter provides patient 24/7 assistance. Prior to admission patient was active with home health care provider. PT/OT/SLP following patient in ARU. Discharge recommendation: to be determined/Home with 24 hour assist vs. SNF.     PHYSICAL THERAPY:    Bed Mobility  Rolling Left: maximum assistance  Rolling Right: maximum assistance  Scootin person assistance with max A x 2   Comments:  Transfers  Sit to stand transfer: 2 person assistance with max A x 2-3   Stand to sit transfer: 2 person assistance with max A x 2-3   Stand pivot transfer: 2 person assistance with max A x 2-dependent assist of 2-3 with stedy   Comments: use of stedy or maxi-elvis    QM:  Roll Left and Right  Assistance Needed: Substantial/maximal assistance  CARE Score: 2  Discharge Goal: Partial/moderate assistance  Sit to Lying  Assistance Needed: Dependent  CARE Score: 1  Discharge Goal: Partial/moderate assistance  Lying to Sitting on Side of Bed  Assistance Needed: Dependent  CARE Score: 1  Discharge Goal: Partial/moderate assistance  Sit to Stand  Assistance Needed: Dependent  CARE Score: 1  Discharge Goal: Partial/moderate assistance  Chair/Bed-to-Chair Transfer  Assistance Needed: Dependent  CARE Score: 1  Discharge Goal: Partial/moderate assistance  Car Transfer  Reason if not Attempted: Not attempted due to medical condition or safety concerns  CARE Score: 88  Discharge Goal: Partial/moderate assistance  Walk 10 Feet  Reason if not Attempted: Not attempted due to medical condition or safety concerns  CARE Score: 88  Discharge Goal: Partial/moderate assistance  Walk 50 Feet with Two Turns  Reason if not Attempted: Not attempted due to medical condition or safety concerns  CARE Score: 88  Discharge Goal: Not Applicable (unrealistic goal at this time due to patient current functional status)  Walk 150 Feet  Reason if not Attempted: Not attempted due to medical condition or safety concerns  CARE Score: 88  Discharge Goal: Not Applicable (unrealistic goal at this time due to patient current functional status)  Walking 10 Feet on Uneven Surfaces  Reason if not Attempted: Not attempted due to medical condition or safety concerns  CARE Score: 88  Discharge Goal: Not Applicable (unrealistic goal at this time due to patient current functional status)  1 Step (Curb)  Reason if not Attempted: Not attempted due to medical condition or safety concerns  CARE Score: 88  Discharge Goal: Not Applicable (unrealistic goal at this time due to patient current functional status)  4 Steps  Reason if not Attempted: Not attempted due to medical condition or safety concerns  CARE Score: 88  Discharge Goal: Not Applicable (unrealistic goal at this time due to patient current functional status)  12 Steps  Reason if not Attempted: Not attempted due to medical condition or safety concerns  CARE Score: 88  Discharge Goal: Not Applicable (unrealistic goal at this time due to patient current functional status)  Picking Up Object  Reason if not Attempted: Not attempted due to medical condition or safety concerns  CARE Score: 88  Discharge Goal: Not Applicable (unrealistic goal at this time due to patient current functional status)  Wheelchair Ability  Uses a Wheelchair and/or Scooter?: Yes    Goals:                   Patient Goals: to go home and be independent    Short Term Goals:  Time Frame: 2 weeks   Patient will complete supine<>sit transfer at moderate assistance   Patient will complete lateral transfer at moderate assistance   Patient will complete manual w/c propulsion 10 ft at moderate assistance     To be met in 3 weeks:  Patient will complete supine<>sit transfer at min A I  Patient will complete stand step transfer at min A  Patient will complete manual w/c propulsion 100 ft at min A  Patient will tolerate ambulation x 10' with use of LRAD with mod A   These goals were reviewed with this patient at the time of assessment and Morgan Bernheim is in agreement.      Plan of Care: Pt to be seen 5 out of 7 days per week per ARU protocol ( 90 minutes with PT)        OCCUPATIONAL THERAPY:    ADL:  Grooming: SBA to min A  Bathing: dependent   Dressing: dependent   Toileting: dependent     Toilet Transfers: has not yet completed toilet transfer during therapy, pt is incontinent of urine and bowels    Tub/ShowerTransfers: has not yet completed shower transfer during therapy    QM:  Eating  Assistance Needed: Supervision or touching assistance  CARE Score: 4  Discharge Goal: Independent  Oral Hygiene  Assistance Needed: Supervision or touching assistance  CARE Score: 4  Discharge Goal: [address] needed: Dependent  CARE Score: 1  Discharge Goal: Independent  Toilet Transfer  Assistance needed: Dependent  CARE Score: 1  Discharge Goal: Independent  Shower/Bathe Self  Assistance Needed: Substantial/maximal assistance  CARE Score: 2  Discharge Goal: Independent  Upper Body Dressing  Assistance Needed: Dependent  CARE Score: 1  Discharge Goal: Independent  Lower Body Dressing  Assistance Needed: Dependent  CARE Score: 1  Discharge Goal: Independent  Putting On/Taking Off Footwear  Assistance Needed: Dependent  CARE Score: 1  Discharge Goal: Independent    Goals:             Short Term Goals:  Time Frame: 3 weeks  Patient will complete upper body ADL at minimal assistance   Patient will complete lower body ADL at minimal assistance   Patient will complete toileting at modified independent   Patient will complete self-feeding at Independent   Patient will complete grooming at Independent   Patient will complete functional transfers at modified independent   Patient will complete functional mobility at modified independent               -Goals not yet met this date  These goals were reviewed with this patient at the time of assessment and Suni Cesar is in agreement    Plan of Care:  Pt to be seen 5 out of 7 days per week per ARU protocol ( 90 minutes with OT)     NUTRITION:  Weight: 163 lb (73.9 kg) / Body mass index is 27.12 kg/m². Diet Order: Regular, 1500 mL fluid res  Supplements: None    Pt with variable PO intakes % of meals. Denied ONS as it causes GI upset. Weight is stable. Please see nutrition note for details. NURSING:    Risk for Readmission: 12%    Ward Fall Risk Score: High  Wounds/Incisions/Ulcers: sacral  Medication Review: Reviewed daily with patient  Pain: Managed with prescribed medication  Consultations/Labs/X-rays: Nephrology and Orthopedic consults. Daily Lab    Patient/Family Education provided by team:    Discharge Plan   Estimated Length of Stay:6 days  Destination: Home  Pass:No  Services at Discharge: OT/PT  Equipment at Discharge: ashutosh lift  Factors facilitating achievement of predicted outcomes: family support  Barriers to the achievement of predicted outcomes: Confusion, Pain, Decreased motivation, Limited participation, Anxiety, Incontinence of bowel, and Incontinence of bladder, PLOF, fear of falling, decreased endurance    Patient Goals: To go home and be independent. Interdisciplinary Individualized Plan of Care Review of Previous Week:    Medical and functional progress towards goals:  Medically looks good, incision looks good, sodium is stable, Nephrology following and adjusting care as needed. Continue cotx, pt still requires significant assistance for transfers and ambulation, slow progress, still significant assistance needed for ADLs and toileting.   Barriers towards progress:  Significant assistance required, Pain, decreased activity tolerance  Interventions to address Barriers:  Continue cotx PT/OT, Pt's daughter trained on current status and required assistance, pain meds ordered  Goals still appropriate:  Yes  Modifications to goals: None  Continue Current Plan of Care:  No  Modifications to Plan of Care: Change discharge to SNF    Rehab Team Members in attendance for Team Conference:  Rashawn Doyle, MSW, LSW    Whitley Gaytan, RD, LD    Herlinda Fernandez, OTR/L    Efren Gifford, PT, DPT    RICHARDSON Cruz PT, DPT,     I approve the established interdisciplinary plan of care as documented within the medical record of Shirin DobsonDanyelle Rolle MD   Electronically signed by Melissa Rolle MD on 10/20/2022 at 11:16 AM

## 2022-10-19 NOTE — PROGRESS NOTES
Notified Dr. Salina Gomez about sodium level result 132 this morning. Received order from Dr. Salina Gomez to give Urea and stop sodium tablet.

## 2022-10-19 NOTE — PLAN OF CARE
Problem: Discharge Planning  Goal: Discharge to home or other facility with appropriate resources  10/18/2022 2234 by Vianca Mccall RN  Outcome: Progressing     Problem: Skin/Tissue Integrity  Goal: Absence of new skin breakdown  Description: 1. Monitor for areas of redness and/or skin breakdown  2. Assess vascular access sites hourly  3. Every 4-6 hours minimum:  Change oxygen saturation probe site  4. Every 4-6 hours:  If on nasal continuous positive airway pressure, respiratory therapy assess nares and determine need for appliance change or resting period.   10/18/2022 2234 by Vianca Mccall RN  Outcome: Progressing     Problem: Safety - Adult  Goal: Free from fall injury  10/18/2022 2234 by Vianca Mccall RN  Outcome: Progressing     Problem: ABCDS Injury Assessment  Goal: Absence of physical injury  10/18/2022 2234 by Vianca Mccall RN  Outcome: Progressing     Problem: Pain  Goal: Verbalizes/displays adequate comfort level or baseline comfort level  10/18/2022 2234 by Vianca Mccall RN  Outcome: Progressing     Problem: Chronic Conditions and Co-morbidities  Goal: Patient's chronic conditions and co-morbidity symptoms are monitored and maintained or improved  10/18/2022 2234 by Vianca Mccall RN  Outcome: Progressing

## 2022-10-19 NOTE — PROGRESS NOTES
[facility]   Nephrology progress Note  [phone] [id number] [facility] [address]           SUMMARY :  We are following this patient for hyponatremia  80year-old female with past medical history of chronic hyponatremia, hypertension, CHF, presented to the hospital after a fall. At that time her serum sodium was 126. She sees my partner Dr. Birdena Romberg  for chronic hyponatremia. SUBJECTIVE:   Patient progress reviewed. The patient has no new complaints, no family by bedside    Physical Exam:    VITALS:  /68   Pulse 82   Temp 97.6 °F (36.4 °C) (Oral)   Resp 16   Ht 5' 5\" (1.651 m)   Wt 163 lb (73.9 kg)   SpO2 95%   BMI 27.12 kg/m²   BLOOD PRESSURE RANGE:  Systolic (06YHC), KFS:058 , Min:127 , WXH:533   ; Diastolic (92YYC), FGY:10, Min:68, Max:73    24HR INTAKE/OUTPUT:    Intake/Output Summary (Last 24 hours) at 10/19/2022 1048  Last data filed at 10/19/2022 0526  Gross per 24 hour   Intake 240 ml   Output 200 ml   Net 40 ml         Gen:  alert, oriented x 2, hard of hearing  PERRL , EOM +  Pallor +, No icterus  JVP not raised   CV: RRR no murmur or rub . Lungs:B/ L air entry, Normal breath sounds   Abd: soft, bowel sounds + , No organomegaly   Ext: No edema, no cyanosis  Skin: Warm.   No rash  Neuro: nonfocal.      DATA:    CBC with Differential:    Lab Results   Component Value Date/Time    WBC 5.9 10/19/2022 06:10 AM    RBC 2.97 10/19/2022 06:10 AM    HGB 8.6 10/19/2022 06:10 AM    HCT 25.7 10/19/2022 06:10 AM     10/19/2022 06:10 AM    MCV 86.6 10/19/2022 06:10 AM    MCH 29.0 10/19/2022 06:10 AM    MCHC 33.4 10/19/2022 06:10 AM    RDW 14.4 10/19/2022 06:10 AM    LYMPHOPCT 15.3 10/10/2022 02:00 PM    MONOPCT 4.2 10/10/2022 02:00 PM    BASOPCT 0.6 10/10/2022 02:00 PM    MONOSABS 0.4 10/10/2022 02:00 PM    LYMPHSABS 1.5 10/10/2022 02:00 PM    EOSABS 0.0 10/10/2022 02:00 PM    BASOSABS 0.1 10/10/2022 02:00 PM     CMP:    Lab Results   Component Value Date/Time     10/19/2022 06:10 AM    K 4.0 10/19/2022 06:10 AM    K 4.1 10/10/2022 02:00 PM    CL 96 10/19/2022 06:10 AM    CO2 24 10/19/2022 06:10 AM    BUN 13 10/19/2022 06:10 AM    CREATININE <0.5 10/19/2022 06:10 AM    GFRAA >60 10/17/2022 05:22 AM    AGRATIO 1.2 10/10/2022 02:00 PM    LABGLOM >60 10/19/2022 06:10 AM    GLUCOSE 119 10/19/2022 06:10 AM    PROT 6.9 10/10/2022 02:00 PM    LABALBU 3.8 10/10/2022 02:00 PM    CALCIUM 9.1 10/19/2022 06:10 AM    BILITOT 0.4 10/10/2022 02:00 PM    ALKPHOS 69 10/10/2022 02:00 PM    AST 16 10/10/2022 02:00 PM    ALT 11 10/10/2022 02:00 PM     Magnesium:    Lab Results   Component Value Date/Time    MG 2.00 10/15/2022 05:59 AM     Phosphorus:  No results found for: PHOS  Uric Acid:  No results found for: Minal Boland  U/A:    Lab Results   Component Value Date/Time    COLORU Yellow 10/10/2022 07:00 PM    PROTEINU Negative 10/10/2022 07:00 PM    PHUR 6.0 10/10/2022 07:00 PM    WBCUA 2 10/10/2022 07:00 PM    RBCUA 1 10/10/2022 07:00 PM    BACTERIA None Seen 10/10/2022 07:00 PM    CLARITYU Clear 10/10/2022 07:00 PM    SPECGRAV 1.025 10/10/2022 07:00 PM    LEUKOCYTESUR SMALL 10/10/2022 07:00 PM    UROBILINOGEN 1.0 10/10/2022 07:00 PM    BILIRUBINUR Negative 10/10/2022 07:00 PM    BLOODU Negative 10/10/2022 07:00 PM    GLUCOSEU Negative 10/10/2022 07:00 PM         IMPRESSION/RECOMMENDATIONS:      Diagnosis and Plan     Hyponatremia:  SIADH  U sodium 72,  urine osmolality 659   plasma osmolality 263  AM cortisol 10.3, TSH 1.45   Started urea 10/18/22  Na up at 132 , stop Sodium tablets   Hypertension  Left shoulder fracture s/p reverse left shoulder arthroplasty with tuberosity fixation on 10/12/2022:    Sally Baires MD

## 2022-10-19 NOTE — PROGRESS NOTES
[facility]   Phone: [phone]    Occupational Therapy    [] Initial Evaluation            [x] Daily Treatment Note         [] Discharge Summary      Patient: Elly Lehman   : 1934   MRN: [id number]   Date of Service:  10/19/2022    Admitting Diagnosis:  Left supracondylar humerus fracture, sequela  Current Admission Summary:  80-year-old female with PMHx of CHF, hypertension, chronic hyponatremia and SIADH presented after sustaining a fall while going to the bathroom. Patient reported pain in her left shoulder and right hip. X-ray revealed a communicated fracture of left humeral neck with humeral shaft displaced. CT head negative for any acute intracranial pathology. Patient taken to surgery by Dr. Amador Reinoso on 10/12 for a repair of her Left comminuted and displaced proximal humerus fracture with a Left reverse shoulder arthroplasty with tuberosity fixation. Post op, patient started in therapies, and patient is able to tolerate 3 hours of therapy 5 days per week and is medically appropriate for rehab on the Acute Rehabilitation Unit. Patient has regular Medicare insurance. She is agreeable to rehab unit treatment. Patient lives at home with her daughter in a two-level house, but she does have a stair lift system in place. Past Medical History:  has a past medical history of CHF (congestive heart failure) ([address] and Hypertension. Past Surgical History:  has a past surgical history that includes Cholecystectomy;  section; and shoulder surgery (Left, 10/12/2022).     Discharge Recommendations:24/7 Assist and SUP, HHOT S3    DME Required For Discharge: DME to be determined pending patient progress,      Precautions/Restrictions: high fall risk, contact precautions, weight bearing, ROM restrictions  Weight Bearing Restrictions: non weight bearing  [] Right Upper Extremity  [x] Left Upper Extremity [] Right Lower Extremity  [] Left Lower Extremity Required Braces/Orthotics:  abductor sling   [] Right  [x] Left  Positional Restrictions: no PROM or AROM L shoulder movement, okay for AROM elbow/forearm, wrist and hand  Comment: per Nicholas Bautista note on 10/17/22:   - WB status:  NWB; continue sling - reviewed post op precautions. Exercises handout provided. Keep a pillow beneath the patient's elbow to keep the forearm in front of the body (do not allow the elbow to slide backwards onto the bed/chair). Loosen the sling 2x/day and have patient perform gentle elbow ROM, forearm rotation, and wrist ROM. NO shoulder ROM at all.     Prior Level of Function  Lives With: daughter  \"Samanta\" Caty Burr has arthritis in her R hip which prevents hip flexion/extension, [employer] works from home)             Type of Home: house  Home Layout: two level, 1/2 bath on main level, bedroom/bathroom upstairs, chair lift to 2nd level  Home Access:  2 step to enter without rails , has portable ramp   Bathroom Layout: tub/shower unit, walk in shower, uses WIS  [employer]: grab bars in shower, shower chair, toilet raiser  Toilet Height: standard height  Home Equipment: rolling walker, manual wheelchair, reacher, oxygen only at night; purewick at nights  Transfer Assistance: Independent without use of device  Ambulation Assistance:modified independent with use of FWW in house, w/c in community  ADL Assistance: requires assistance with toileting, dressing, and bathing, dtr provides setup assistance for grooming tasks  IADL Assistance: requires assistance with all homemaking tasks, requires assistance for medication management  Active :        [] Yes                 [x] No daughter provides transportation  Hand Dominance: [] Left                 [x] Right  Hobbies: enjoys reading the paper, watching TV  Recent Falls: 1 recent fall in last 6 months prior to this fall that lead to this admission  Comment: per chart review-  pt came to hospital ED on 9/1/22 5/6/22, and 11/3/21 for falls      Subjective  General: Pt supine in bed upon arival, agreeable to OT/PT co tx. Pt's dtr present throughout session. OT donned/doffed yellow isolation gowns per contact precautions. Pt with sling donned with LUE supported by pillow. Pain: 7/10. Location: L shoulder  Pain Interventions: pain medication in place prior to arrival, RN notified of patient request for pain medication, and repositioned       Activities of Daily Living  Basic Activities of Daily Living  Grooming: stand by assistance  Grooming Comments: cues of encouragement to comb hair, cues for sequencing brushing teeth  Upper Extremity Dressing: dependent requires verbal cueing Increased time to complete task Comment: pt's dtr actively assisted with dressing, pt unable to state proper alia-dressing technique, dtr aware  Lower Extremity Dressing: dependent requires verbal cueing Increased time to complete task  Dressing Comments: max A for rolling supine in bed with A to pull up pants, dep for tabbed brief, max A for socks   Toileting: dependent. Toileting Comments: purewick, incontinent of urine and stool, purewick removed d/t maintain skin integrity  General Comments: C/o pain and nausea throughout, pt verbalized fear of falling  Instrumental Activities of Daily Living  No IADL completed on this date. Functional Mobility  Bed Mobility  Rolling Left: maximum assistance  Rolling Right: maximum assistance  Comment: partial roll to left, not complete roll d/t s/p L reverse total shoulder arthroplasty  Transfers  Bed / Chair transfer: dependent assistance, kayli. Bed / Chair equipment: no device  Bed / Chair comments: Dl [employer] elvis to tilt table and then from tilt table to recliner  Comment: pt c/o pain throughout t/f and fear of falling  Functional Mobility  Standing Balance: dependent assistance, tilt table ~60*-55*.     Standing Balance Comment: partial  tilt table ~15 minutes with c/o pain throughout  No functional mobility completed on this date secondary to safety as pt requires max Ax2 to stand. Therapeutic Intervention  While in stance on tilt table (dep for balance as indicated above), pt completed 1 set of 10x in RUE: AAROM of shoulder flexion, AROM shoulder abduction to 90*, and chest press. While pt's LUE was in sling pt completed 10x: wrist flexion/extension and supination/pronation. Pt required max cues throughout session for encouragement and active participation. Pt educated on purpose of therapeutic task, pt stated \"this is torture\". Pt's dtr provided encouragement throughout. Pt and dtr instructed to complete theraputty HEP per OT recommendation in previous session and edu on the purpose. Pt's dtr is receptive. Second Session:  Upon OT arrival, pt seated upright in recliner. Agreeable to OT/PT co-treat with max cues of encouragement. Pt requires co-tx secondary to complexity of condition, decreased activity tolerance and increased assistance for ADL/mobility. Pt benefits from x 2 skilled hands to provide increased cues/feedback and promote improved safety and performance with ADLs. [facility] max A x3 using Bobath technique. Pt then educated on the importance of active participation in OT/PT to improve strength and standing balance for home. Pt then completed 2x unsuccessful stands with max A x3. Pt then provided with a quad cane for RUE and used a sling for buttocks to assist with posture and balance: 1 stand completed with max A x3. Pt provided with max cues for encouragement/emotional support and a tactile cue to sternum to remain in an upright posture. Pt completed one sit<>stand using the jing stedy with max A x3 with verbal cues and tactile cues for posture and adherence to NWB status in LUE. Pt and dtr educated throughout session on the purpose of therapeutic activity and need for active participation to return to PLOF.  Pt left in chair, call light within reach, chair alarm in place, and caregiver present.     Cognition  Overall Cognitive Status: Impaired  Arousal/Alterness: appropriate responses to stimuli  Following Commands: follows one step commands with repetition, follows one step commands with increased time  Attention Span: difficulty attending to directions, difficulty dividing attention  Memory: decreased recall of recent events, decreased short term memory, decreased long term memory  Safety Judgement: decreased awareness of need for assistance, decreased awareness of need for safety  Problem Solving: assistance required to generate solutions, assistance required to implement solutions, decreased awareness of errors, assistance required to identify errors made, assistance required to correct errors made  Insights: decreased awareness of deficits  Initiation: requires cues for all  Sequencing: requires cues for all  Comments: self-limiting, anxious, fearful  Orientation:    oriented to person, oriented to place, oriented to time, oriented to situation, and disoriented to situation cues to remind pt regarding shoulder surgery, pt states \"my shoulder hurts\" but has difficulties stating why  Command Following:   impaired--inconsistent with participation and command following     Education  Barriers To Learning: cognition, hearing, emotional, and physical  Patient Education: patient educated on goals, OT role and benefits, plan of care, precautions, weight-bearing education, family education, transfer training, discharge recommendations  Learning Assessment:  patient verbalizes understanding, would benefit from continued reinforcement    Assessment  Activity Tolerance:poor d/t pain and anxiety/fear  Impairments Requiring Therapeutic Intervention: decreased functional mobility, decreased ADL status, decreased safety awareness, decreased cognition, decreased endurance, decreased balance, increased pain, decreased posture  Prognosis: fair  Clinical Assessment: Pt is not at baseline level of function as she is dependent for dressing, bathing, and toileting. Pt requires SBA for grooming tasks. Pt requires assist of 2-3 ppl for functional transfers. Pt is nauseas, c/o pain, and verbalizes fear of falling throughout session. Pt will benefit from skilled OT to maximize safety and occupational performance as well as reduce caregiver burden.   Safety Interventions: patient left in chair, chair alarm in place, call light within reach, gait belt, patient at risk for falls, nurse notified, and family/caregiver present    Plan  Frequency: 5 x/week, 90 min/day  Current Treatment Recommendations: strengthening, balance training, functional mobility training, transfer training, endurance training, patient/caregiver education, ADL/self-care training, IADL training, home management training, pain management, and safety education    Goals  Patient Goals: Go home   Short Term Goals:  Time Frame: 3 weeks  Patient will complete upper body ADL at minimal assistance   Patient will complete lower body ADL at minimal assistance   Patient will complete toileting at modified independent   Patient will complete self-feeding at Independent   Patient will complete grooming at Independent   Patient will complete functional transfers at modified independent   Patient will complete functional mobility at modified independent    -Goals not yet met this date        Therapy Session Time  First Therapy Time:   Individual Group Co-treatment   Time In    730   Time Out    830   Minutes   60      Second Therapy Time:   Individual Group Co-treatment   Time In   1030   Time Out   1100   Minutes   30     Timed Code Treatment Minutes: 60+30    Total Treatment Minutes: 90 minutes       Electronically Signed By: Damian Sanches [address] [place] [address]

## 2022-10-19 NOTE — PLAN OF CARE
Problem: Discharge Planning  Goal: Discharge to home or other facility with appropriate resources  Outcome: Progressing  Flowsheets (Taken 10/19/2022 1340)  Discharge to home or other facility with appropriate resources:   Identify barriers to discharge with patient and caregiver   Identify discharge learning needs (meds, wound care, etc)   Refer to discharge planning if patient needs post-hospital services based on physician order or complex needs related to functional status, cognitive ability or social support system     Problem: Skin/Tissue Integrity  Goal: Absence of new skin breakdown  Description: 1. Monitor for areas of redness and/or skin breakdown  2. Assess vascular access sites hourly  3. Every 4-6 hours minimum:  Change oxygen saturation probe site  4. Every 4-6 hours:  If on nasal continuous positive airway pressure, respiratory therapy assess nares and determine need for appliance change or resting period.   Outcome: Progressing     Problem: Safety - Adult  Goal: Free from fall injury  Outcome: Progressing  Flowsheets (Taken 10/19/2022 1340)  Free From Fall Injury: Instruct family/caregiver on patient safety     Problem: ABCDS Injury Assessment  Goal: Absence of physical injury  Outcome: Progressing  Flowsheets (Taken 10/19/2022 1340)  Absence of Physical Injury: Implement safety measures based on patient assessment     Problem: Pain  Goal: Verbalizes/displays adequate comfort level or baseline comfort level  Outcome: Progressing  Flowsheets (Taken 10/19/2022 1340)  Verbalizes/displays adequate comfort level or baseline comfort level:   Encourage patient to monitor pain and request assistance   Assess pain using appropriate pain scale   Administer analgesics based on type and severity of pain and evaluate response   Implement non-pharmacological measures as appropriate and evaluate response     Problem: Nutrition Deficit:  Goal: Optimize nutritional status  Outcome: Progressing  Flowsheets (Taken 10/19/2022 1340)  Nutrient intake appropriate for improving, restoring, or maintaining nutritional needs:   Assess nutritional status and recommend course of action   Monitor oral intake, labs, and treatment plans   Recommend appropriate diets, oral nutritional supplements, and vitamin/mineral supplements     Problem: Chronic Conditions and Co-morbidities  Goal: Patient's chronic conditions and co-morbidity symptoms are monitored and maintained or improved  Outcome: Progressing  Flowsheets (Taken 10/19/2022 1340)  Care Plan - Patient's Chronic Conditions and Co-Morbidity Symptoms are Monitored and Maintained or Improved:   Monitor and assess patient's chronic conditions and comorbid symptoms for stability, deterioration, or improvement   Collaborate with multidisciplinary team to address chronic and comorbid conditions and prevent exacerbation or deterioration

## 2022-10-19 NOTE — PROGRESS NOTES
[facility]   Phone: [phone]    Physical Therapy    [] Initial Evaluation            [x] Daily Treatment Note         [] Discharge Summary      Patient: Tal Gil   : 1934   MRN: [id number]   Date of Service:  10/19/2022  Admitting Diagnosis: Left supracondylar humerus fracture, sequela  Current Admission Summary: 70-year-old female with PMHx of CHF, hypertension, chronic hyponatremia and SIADH presented after sustaining a fall while going to the bathroom. Patient reported pain in her left shoulder and right hip. X-ray revealed a communicated fracture of left humeral neck with humeral shaft displaced. CT head negative for any acute intracranial pathology. Patient taken to surgery by Dr. Dakota Ward on 10/12 for a repair of her Left comminuted and displaced proximal humerus fracture with a Left reverse shoulder arthroplasty with tuberosity fixation. Post op, patient started in therapies, and patient is able to tolerate 3 hours of therapy 5 days per week and is medically appropriate for rehab on the Acute Rehabilitation Unit. Patient has regular Medicare insurance. She is agreeable to rehab unit treatment. Patient lives at home with her daughter in a two-level house, but she does have a stair lift system in place. Past Medical History:  has a past medical history of CHF (congestive heart failure) ([address] and Hypertension. Past Surgical History:  has a past surgical history that includes Cholecystectomy;  section; and shoulder surgery (Left, 10/12/2022).   Discharge Recommendations: home with 24 hour assist vs SNF  DME Required For Discharge: wheelchair, mechanical lift  Precautions/Restrictions: high fall risk  Weight Bearing Restrictions: non weight bearing  [] Right Upper Extremity  [x] Left Upper Extremity [] Right Lower Extremity  [] Left Lower Extremity     Required Braces/Orthotics: LUE abductor sling   [] Right  [x] Left  Positional Restrictions:no PROM or AROM L shoulder movement, okay for AROM elbow/forearm, wrist and hand    Lives With: daughter               Type of Home: house  Home Layout: two level, 1/2 bath on main level, bedroom/bathroom upstairs, chair lift to 2nd level  Home Access:  2 step to enter without rails , has portable ramp   Bathroom Layout: tub/shower unit, walk in shower, uses WIS  Bathroom Equipment: grab bars in shower, shower chair, toilet raiser  Toilet Height: standard height  Home Equipment: rolling walker, manual wheelchair, reacher, oxygen only at night; purewick at nights  Transfer Assistance: Independent without use of device  Ambulation Assistance:modified independent with use of FWW in house, w/c in community  ADL Assistance: independent with all ADL's  IADL Assistance: requires assistance with all homemaking tasks, requires assistance for medication management  Active :        [] Yes                 [x] No daughter provides transportation  Hand Dominance: [] Left                 [x] Right  Hobbies: enjoys reading the paper, watching TV  Recent Falls: 1 recent fall in last 6 months prior to this fall that lead to this admission           Subjective  General: Patient is supine in bed upon PT/OT arrival, daughter present, pt requesting second pain pill  Pain: 5/10.   Location: L shoulder  Pain Interventions: pain medication in place prior to arrival and RN notified of patient request for pain medication       Functional Mobility  Bed Mobility  Rolling Left: maximum assistance  Rolling Right: maximum assistance  Scootin person assistance with max A x 2   Comments:  Transfers  Sit to stand transfer: 2 person assistance with max A x 2-3   Stand to sit transfer: 2 person assistance with max A x 2-3   Stand pivot transfer: 2 person assistance with max A x 2-dependent assist of 2-3 with stedy   Comments:  Ambulation  Ambulation not tested on this date secondary to patient requires maximal assistance to stand.  Distance:   Gait Mechanics:   Comments:    Stair Mobility  Stair mobility not completed on this date. Comments:  Wheelchair Mobility:  No w/c mobility completed on this date. Comments:  Balance  Static Sitting Balance: fair (+): maintains balance at SBA/supervision without use of UE support  Dynamic Sitting Balance: fair (+): maintains balance at SBA/supervision without use of UE support  Static Standing Balance: poor (-): requires max (A) to maintain balance  Dynamic Standing Balance: poor (-): requires max (A) to maintain balance  Comments:        Other Therapeutic Interventions    1st session:  Performed rolling as documented above for pericare, brief change, and donning pants. Performed maxi-elvis transfer from bed to tilt table. Pt remained at 60-65 degrees on tilt table X 15 minutes. Performed UE exercises with OT. Performed mini squats X 10, B toe raises X 10 on tilt table with facilitation at quads for activation. Attempted heel raises without success. Pt returned to recliner via maxi-elvis. Daughter assisted with PT and OT to doff and don shirt. Pt remained in recliner with alarm on and all needs in reach. Second session: Pt in recliner on arrival.  Attempted STS with maxA of 3 with use of sling, christine, gait belt without success. Performed STS to stedy with maxA of 2. Pt required moderate assist to maintain upright sitting in stedy. Performed STS from high sit with modA of 2 X 5 reps. Pt returned to recliner with alarm on and all needs in reach.      Functional Outcomes                 Cognition  Overall Cognitive Status: Impaired  Arousal/Alterness: appropriate responses to stimuli  Following Commands: follows one step commands with repetition, follows one step commands with increased time  Safety Judgement: decreased awareness of need for assistance  Initiation: requires cues for some  Orientation:    alert and oriented x 4  Command Following:   impaired--inconsistent with participation and command following    Education  Barriers To Learning: cognition and hearing  Patient Education: patient educated on goals, PT role and benefits, plan of care, weight-bearing education, general safety, functional mobility training, transfer training, discharge recommendations  Learning Assessment:  patient verbalizes understanding, would benefit from continued reinforcement    Assessment  Activity Tolerance: limited due to increased pain with any mobility  Impairments Requiring Therapeutic Intervention: decreased functional mobility, decreased strength, decreased safety awareness, decreased cognition, decreased endurance, decreased balance  Prognosis: guarded  Clinical Assessment: Pt continues with a significant fear of falling. She also demonstrates decreased strength and balance and requires assistance of 2-3 people for all mobility or the use of lift equipment. Pt will continue to benefit from skilled PT services to address deficits and promote safety in the home environment.    Safety Interventions: patient left in chair, chair alarm in place, call light within reach, and nurse notified    Plan  Frequency: 5 x/week, 90 min/day  Current Treatment Recommendations: strengthening, ROM, balance training, functional mobility training, transfer training, gait training, stair training, endurance training, patient/caregiver education, safety education, and equipment evaluation/education    Goals  Patient Goals: to go home and be independent    Short Term Goals:  Time Frame: 2 weeks   Patient will complete supine<>sit transfer at moderate assistance   Patient will complete lateral transfer at moderate assistance   Patient will complete manual w/c propulsion 10 ft at moderate assistance    To be met in 3 weeks:  Patient will complete supine<>sit transfer at min A I  Patient will complete stand step transfer at min A  Patient will complete manual w/c propulsion 100 ft at min A  Patient will tolerate ambulation x 10' with use of LRAD with mod A     Therapy Session Time      Individual Group Co-treatment   Time In      730   Time Out      830   Minutes      60       Second Session Therapy Time:   Individual Concurrent Group Co-treatment   Time In        1030   Time Out        1100   Minutes        30         Timed Code Treatment Minutes:  60+30      Total Treatment Minutes:  90      Electronically Signed By: Tiffanie Otto PT

## 2022-10-19 NOTE — PROGRESS NOTES
Narciso Yuen  10/19/2022  [id number]    Chief Complaint: Left supracondylar humerus fracture, sequela    Subjective:   No overnight events. Improved nausea today. Labs reviewed. Slow progress in therapy. ROS: No CP, SOB, dyspnea    Objective:  Patient Vitals for the past 24 hrs:   BP Temp Temp src Pulse Resp SpO2   10/19/22 0904 127/68 97.6 °F (36.4 °C) Oral 82 16 95 %   10/19/22 0754 -- -- -- -- 16 --   10/19/22 0540 -- -- -- -- 18 --   10/19/22 0510 -- -- -- -- 18 --   10/18/22 2115 137/73 97.6 °F (36.4 °C) Oral 76 18 97 %       Gen: No distress, pleasant. Resting in bedside chair  HEENT: Normocephalic, atraumatic. CV: Regular rate and rhythm. No MRG   Resp: No respiratory distress. CTAB   Abd: Soft, nontender nondistended  Ext: No edema. LUE in sling with post op dressing  Neuro: Alert, oriented, appropriately interactive. Laboratory data: Available via EMR. Therapy progress:    PT    Supine to Sit: Dependent  Sit to Supine: Dependent   Sit to Stand: Dependent  Chair/Bed to Chair Transfer: Dependent  Car Transfer:    Ambulation 10 ft:    Ambulation 50 ft:    Ambulation 150 ft:    Stairs - 1 Step:    Stairs - 4 Step:    Stairs - 12 Step:      OT    Eating: Supervision or touching assistance  Oral Hygiene: Supervision or touching assistance  Bathing: Substantial/maximal assistance  Upper Body Dressing: Dependent  Lower Body Dressing: Dependent  Toilet Transfer: Dependent  Toilet Hygiene: Dependent    Speech Therapy         Body mass index is 27.12 kg/m². Assessment:  Patient Active Problem List   Diagnosis    Closed displaced fracture of surgical neck of humerus, initial encounter    Osteoporosis with current pathological fracture, initial encounter    Hyponatremia    Left supracondylar humerus fracture, sequela       Plan:   Displaced left proximal humeral fracture- Left reverse shoulder arthroplasty on 10/12 with Dr Adrian Briceño. NWB LUE. Continue sling.       Acute on chronic hyponatremia-Nephrology on board, restarted back on salt tab. Fluid restriction. Urea     Hypertension: nifedipine XL     Bowel: Per protocol  Bladder: Per protocol  Sleep: Trazodone PRN  Pain: tylenol, scheduled, tramadol PRN  DVT PPx: lovenox  JAIRO: QUYNH Torres MD 10/19/2022, 9:59 AM    * This document was created using dictation software. While all precautions were taken to ensure accuracy, errors may have occurred. Please disregard any typographical errors.

## 2022-10-19 NOTE — CARE COORDINATION
[facility] Wound Ostomy Continence Nurse  Consult Note       NAME:  Bladimir Garcia  MEDICAL RECORD NUMBER:  [id number]  AGE: 80 y.o. GENDER: female  : 1934  TODAY'S DATE:  10/19/2022    Subjective   Reason for WOCN Evaluation and Assessment: redness intergluteal cleft      Bladimir Garcia is a 80 y.o. female referred by:   [x] Physician  [x] Nursing  [] Other:     Wound Identification:  Wound Type: undetermined  Contributing Factors: incontinence of urine    Wound History: present on admission  Current Wound Care Treatment:  open to air    Patient Goal of Care:  [x] Wound Healing  [] Odor Control  [] Palliative Care  [] Pain Control   [] Other:         PAST MEDICAL HISTORY        Diagnosis Date    CHF (congestive heart failure) ([facility] [address]     Hypertension        PAST SURGICAL HISTORY    Past Surgical History:   Procedure Laterality Date     SECTION      CHOLECYSTECTOMY      SHOULDER SURGERY Left 10/12/2022    LEFT REVERSE TOTAL SHOULDER ARTHROPLASTY WITH TUBEROSITY FIXATION - Deloria Reining; REGIONAL BLOCK performed by Gera Seth MD at [address]    No family history on file. SOCIAL HISTORY    Social History     Tobacco Use    Smoking status: Never    Smokeless tobacco: Never   Substance Use Topics    Alcohol use: Not Currently    Drug use: Never       ALLERGIES    Allergies   Allergen Reactions    Azithromycin Hives and Other (See Comments)    Hydrocodone-Acetaminophen Diarrhea, Nausea Only, Other (See Comments) and Nausea And Vomiting    Strawberry (Diagnostic) Hives    Adhesive Tape Other (See Comments)    Strawberry Other (See Comments)    Tramadol Nausea Only and Other (See Comments)     \"Turned patient into a zombie\"  Turned pt into a zombie         MEDICATIONS    No current facility-administered medications on file prior to encounter.      Current Outpatient Medications on File Prior to Encounter   Medication Sig Dispense Refill    sodium chloride 1 g tablet Take 1 tablet by mouth 3 times daily (with meals) for 14 days 42 tablet 0    loratadine (CLARITIN) 10 MG tablet Take 10 mg by mouth daily      magnesium oxide (MAG-OX) 400 MG tablet Take 400 mg by mouth 2 times daily Take it with meal      docusate sodium (COLACE) 100 MG capsule Take 100 mg by mouth 2 times daily      aspirin 81 MG EC tablet Take 81 mg by mouth daily      b complex vitamins capsule Take 1 capsule by mouth daily      potassium chloride (MICRO-K) 10 MEQ extended release capsule Take 10 mEq by mouth daily      NIFEdipine (ADALAT CC) 30 MG extended release tablet Take 30 mg by mouth daily      lidocaine (LIDODERM) 5 % Place 1 patch onto the skin daily 12 hours on, 12 hours off. Objective    /68   Pulse 82   Temp 97.6 °F (36.4 °C) (Oral)   Resp 16   Ht 5' 5\" (1.651 m)   Wt 163 lb (73.9 kg)   SpO2 95%   BMI 27.12 kg/m²     LABS:  WBC:    Lab Results   Component Value Date/Time    WBC 5.9 10/19/2022 06:10 AM     H/H:    Lab Results   Component Value Date/Time    HGB 8.6 10/19/2022 06:10 AM    HCT 25.7 10/19/2022 06:10 AM     PTT:  No results found for: APTT, PTT[APTT}  PT/INR:    Lab Results   Component Value Date/Time    PROTIME 12.4 10/10/2022 04:43 PM    INR 0.93 10/10/2022 04:43 PM     HgBA1c:  No results found for: LABA1C    Assessment   Ihsan Risk Score: Ihsan Scale Score: 15    Patient Active Problem List   Diagnosis Code    Closed displaced fracture of surgical neck of humerus, initial encounter S42.213A    Osteoporosis with current pathological fracture, initial encounter M80.00XA    Hyponatremia E87.1    Left supracondylar humerus fracture, sequela S42.412S       Measurements:     Incision 10/12/22 Shoulder Left (Active)   Dressing Status Clean;Dry; Intact 10/19/22 0900   Dressing/Treatment Silver dressing 10/19/22 0900   Closure Other (Comment) 10/19/22 0900   Margins Other (Comment) 10/19/22 0900   Incision Assessment Other (Comment) 10/19/22 0900   Drainage Amount None 10/18/22 2118 Odor None 10/18/22 2115   Annika-incision Assessment Other (Comment) 10/19/22 0900   Number of days: 6     Patient fell at home and was admitted on 10/10/22. She was transferred to ARU on 10/14/22. She has a left arm fracture and is in a sling. She is NWB to left arm. Patient non blanchable redness to intergluteal cleft and a purple spot to the coccyx. Redness and stop to coccyx feel callous, skin is intact, annika-wound skin blanchable. [place] Oven is in placed. Also patient was using purewick before admission at home. Recommend using Triad ointment to affected areas and sacral mepilex. Recommend helping patient to reposition every two hours and as needed. Patient is on a Specialty bed. Response to treatment:  Well tolerated by patient. Pain Assessment:  Severity:  0 / 10  Quality of pain: N/A  Wound Pain Timing/Severity: none  Premedicated: No    Plan   Plan of Care:    Recommend using Triad ointment to affected areas and sacral mepilex. Apply Triad twice daily and as needed. Recommend helping patient to reposition every two hours and as needed. Specialty Bed Required : Yes   [x] Low Air Loss   [x] Pressure Redistribution  [] Fluid Immersion  [] Bariatric  [] Total Pressure Relief  [] Other:     Current Diet: ADULT DIET;  Regular; 1500 ml  Dietician consult:  No    Discharge Plan:  Placement for patient upon discharge: home with support    Patient appropriate for Outpatient [address]: No    Referrals:  []   [] [address]  [] Supplies  [] Other    Patient/Caregiver Teaching:  Level of patient/caregiver understanding able to:   [x] Indicates understanding       [x] Needs reinforcement  [] Unsuccessful      [] Verbal Understanding  [] Demonstrated understanding       [] No evidence of learning  [] Refused teaching         [] N/A       Electronically signed by LEATHA Presley, RN, [address] [place] [address]   [phone] on 10/19/2022 at 4:14 PM

## 2022-10-20 LAB
ANION GAP SERPL CALCULATED.3IONS-SCNC: 7 MMOL/L (ref 3–16)
BUN BLDV-MCNC: 17 MG/DL (ref 7–20)
CALCIUM SERPL-MCNC: 9.3 MG/DL (ref 8.3–10.6)
CHLORIDE BLD-SCNC: 94 MMOL/L (ref 99–110)
CO2: 27 MMOL/L (ref 21–32)
CREAT SERPL-MCNC: <0.5 MG/DL (ref 0.6–1.2)
GFR SERPL CREATININE-BSD FRML MDRD: >60 ML/MIN/{1.73_M2}
GLUCOSE BLD-MCNC: 86 MG/DL (ref 70–99)
POTASSIUM SERPL-SCNC: 4.1 MMOL/L (ref 3.5–5.1)
SODIUM BLD-SCNC: 128 MMOL/L (ref 136–145)

## 2022-10-20 PROCEDURE — 6370000000 HC RX 637 (ALT 250 FOR IP): Performed by: PHYSICAL MEDICINE & REHABILITATION

## 2022-10-20 PROCEDURE — 80048 BASIC METABOLIC PNL TOTAL CA: CPT

## 2022-10-20 PROCEDURE — 1280000000 HC REHAB R&B

## 2022-10-20 PROCEDURE — 6370000000 HC RX 637 (ALT 250 FOR IP): Performed by: INTERNAL MEDICINE

## 2022-10-20 PROCEDURE — 97530 THERAPEUTIC ACTIVITIES: CPT

## 2022-10-20 PROCEDURE — 97535 SELF CARE MNGMENT TRAINING: CPT

## 2022-10-20 PROCEDURE — 6360000002 HC RX W HCPCS: Performed by: PHYSICAL MEDICINE & REHABILITATION

## 2022-10-20 RX ORDER — NIFEDIPINE 30 MG/1
30 TABLET, EXTENDED RELEASE ORAL DAILY
Status: DISCONTINUED | OUTPATIENT
Start: 2022-10-21 | End: 2022-10-25 | Stop reason: HOSPADM

## 2022-10-20 RX ADMIN — ACETAMINOPHEN 1000 MG: 500 TABLET ORAL at 10:06

## 2022-10-20 RX ADMIN — CETIRIZINE HYDROCHLORIDE 5 MG: 10 TABLET, FILM COATED ORAL at 10:06

## 2022-10-20 RX ADMIN — TRAMADOL HYDROCHLORIDE 50 MG: 50 TABLET ORAL at 16:02

## 2022-10-20 RX ADMIN — POLYETHYLENE GLYCOL 3350 17 G: 17 POWDER, FOR SOLUTION ORAL at 10:07

## 2022-10-20 RX ADMIN — Medication 30 G: at 10:06

## 2022-10-20 RX ADMIN — ENOXAPARIN SODIUM 40 MG: 100 INJECTION SUBCUTANEOUS at 10:07

## 2022-10-20 RX ADMIN — NIFEDIPINE 30 MG: 30 TABLET, EXTENDED RELEASE ORAL at 10:06

## 2022-10-20 RX ADMIN — TRAMADOL HYDROCHLORIDE 50 MG: 50 TABLET ORAL at 21:26

## 2022-10-20 RX ADMIN — TRAZODONE HYDROCHLORIDE 50 MG: 50 TABLET ORAL at 21:26

## 2022-10-20 RX ADMIN — TRAMADOL HYDROCHLORIDE 100 MG: 50 TABLET, FILM COATED ORAL at 07:51

## 2022-10-20 ASSESSMENT — PAIN DESCRIPTION - ONSET
ONSET: ON-GOING
ONSET: ON-GOING

## 2022-10-20 ASSESSMENT — PAIN DESCRIPTION - LOCATION
LOCATION: ARM;SHOULDER
LOCATION: ARM;SHOULDER
LOCATION: SHOULDER
LOCATION: SHOULDER

## 2022-10-20 ASSESSMENT — PAIN SCALES - GENERAL
PAINLEVEL_OUTOF10: 3
PAINLEVEL_OUTOF10: 3
PAINLEVEL_OUTOF10: 5
PAINLEVEL_OUTOF10: 3
PAINLEVEL_OUTOF10: 0
PAINLEVEL_OUTOF10: 3
PAINLEVEL_OUTOF10: 3
PAINLEVEL_OUTOF10: 7
PAINLEVEL_OUTOF10: 5
PAINLEVEL_OUTOF10: 5
PAINLEVEL_OUTOF10: 3
PAINLEVEL_OUTOF10: 5
PAINLEVEL_OUTOF10: 3

## 2022-10-20 ASSESSMENT — PAIN DESCRIPTION - DESCRIPTORS
DESCRIPTORS: ACHING

## 2022-10-20 ASSESSMENT — PAIN - FUNCTIONAL ASSESSMENT
PAIN_FUNCTIONAL_ASSESSMENT: PREVENTS OR INTERFERES SOME ACTIVE ACTIVITIES AND ADLS
PAIN_FUNCTIONAL_ASSESSMENT: PREVENTS OR INTERFERES SOME ACTIVE ACTIVITIES AND ADLS
PAIN_FUNCTIONAL_ASSESSMENT: ACTIVITIES ARE NOT PREVENTED
PAIN_FUNCTIONAL_ASSESSMENT: PREVENTS OR INTERFERES WITH ALL ACTIVE AND SOME PASSIVE ACTIVITIES

## 2022-10-20 ASSESSMENT — PAIN DESCRIPTION - ORIENTATION
ORIENTATION: LEFT

## 2022-10-20 ASSESSMENT — PAIN DESCRIPTION - FREQUENCY
FREQUENCY: CONTINUOUS
FREQUENCY: CONTINUOUS

## 2022-10-20 ASSESSMENT — PAIN DESCRIPTION - PAIN TYPE
TYPE: ACUTE PAIN;SURGICAL PAIN
TYPE: ACUTE PAIN

## 2022-10-20 NOTE — PROGRESS NOTES
[facility]   Phone: [phone]    Physical Therapy    [] Initial Evaluation            [x] Daily Treatment Note         [] Discharge Summary      Patient: Cristina Villanueva   : 1934   MRN: [id number]   Date of Service:  10/20/2022  Admitting Diagnosis: Left supracondylar humerus fracture, sequela  Current Admission Summary: 19-year-old female with PMHx of CHF, hypertension, chronic hyponatremia and SIADH presented after sustaining a fall while going to the bathroom. Patient reported pain in her left shoulder and right hip. X-ray revealed a communicated fracture of left humeral neck with humeral shaft displaced. CT head negative for any acute intracranial pathology. Patient taken to surgery by Dr. Yeyo Flaherty on 10/12 for a repair of her Left comminuted and displaced proximal humerus fracture with a Left reverse shoulder arthroplasty with tuberosity fixation. Post op, patient started in therapies, and patient is able to tolerate 3 hours of therapy 5 days per week and is medically appropriate for rehab on the Acute Rehabilitation Unit. Patient has regular Medicare insurance. She is agreeable to rehab unit treatment. Patient lives at home with her daughter in a two-level house, but she does have a stair lift system in place. Past Medical History:  has a past medical history of CHF (congestive heart failure) ([address] and Hypertension. Past Surgical History:  has a past surgical history that includes Cholecystectomy;  section; and shoulder surgery (Left, 10/12/2022).   Discharge Recommendations: home with 24 hour assist vs SNF  DME Required For Discharge: wheelchair, mechanical lift  Precautions/Restrictions: high fall risk  Weight Bearing Restrictions: non weight bearing  [] Right Upper Extremity  [x] Left Upper Extremity [] Right Lower Extremity  [] Left Lower Extremity     Required Braces/Orthotics: LUE abductor sling   [] Right  [x] Left  Positional Restrictions:no PROM or AROM L shoulder movement, okay for AROM elbow/forearm, wrist and hand    Lives With: daughter               Type of Home: house  Home Layout: two level, 1/2 bath on main level, bedroom/bathroom upstairs, chair lift to 2nd level  Home Access:  2 step to enter without rails , has portable ramp   Bathroom Layout: tub/shower unit, walk in shower, uses WIS  Bathroom Equipment: grab bars in shower, shower chair, toilet raiser  Toilet Height: standard height  Home Equipment: rolling walker, manual wheelchair, reacher, oxygen only at night; purewick at nights  Transfer Assistance: Independent without use of device  Ambulation Assistance:modified independent with use of FWW in house, w/c in community  ADL Assistance: independent with all ADL's  IADL Assistance: requires assistance with all homemaking tasks, requires assistance for medication management  Active :        [] Yes                 [x] No daughter provides transportation  Hand Dominance: [] Left                 [x] Right  Hobbies: enjoys reading the paper, watching TV  Recent Falls: 1 recent fall in last 6 months prior to this fall that lead to this admission           Subjective  General: Patient is supine in bed upon PT/OT arrival, daughter present, pt requesting pain medication  Pain: 7/10. Location: L shoulder  Pain Interventions: RN notified of patient request for pain medication       Functional Mobility  Bed Mobility  Rolling Left: maximum assistance  Rolling Right: maximum assistance  Scootin person assistance with max A x 2   Comments:  Transfers  Sit to stand transfer: 2 person assistance with max A x 2-3   Stand to sit transfer: 2 person assistance with max A x 2-3   Stand pivot transfer: 2 person assistance with max A x 2-dependent assist of 2-3 with stedy   Comments:  Ambulation  Ambulation not tested on this date secondary to patient requires maximal assistance to stand.   Distance:   Gait Mechanics:   Comments: Stair Mobility  Stair mobility not completed on this date. Comments:  Wheelchair Mobility:  No w/c mobility completed on this date. Comments:  Balance  Static Sitting Balance: fair (+): maintains balance at SBA/supervision without use of UE support  Dynamic Sitting Balance: fair (+): maintains balance at SBA/supervision without use of UE support  Static Standing Balance: poor (-): requires max (A) to maintain balance  Dynamic Standing Balance: poor (-): requires max (A) to maintain balance  Comments:        Other Therapeutic Interventions    1st session:  Performed rolling for LB dressing as documented above. Performed SPT to w/c with maxA of 2. Attempted STS at grab bar in bathroom. Daughter in to assist.  Pt unable to clear bottom for transfer. Transferred to tilt table via maxi-elvis. Pt stood at 60-70 degrees on tilt table X 15 minutes. Performed mini squats X 10 and B toe raises X 10. Pt returned to recliner via maxi-elvis with alarm on and all needs in reach. 2nd session:  Pt in recliner on arrival.  Pt requesting cleaning up for BM. Performed stedy transfer X 3 from recliner to bed. Performed rolling as documented above for pericare. Performed supine knee to chest X 10 and hip abduction X 10. Pt remained in supine with alarm on and all needs in reach.      Functional Outcomes                 Cognition  Overall Cognitive Status: Impaired  Arousal/Alterness: appropriate responses to stimuli  Following Commands: follows one step commands with repetition, follows one step commands with increased time  Safety Judgement: decreased awareness of need for assistance  Initiation: requires cues for some  Orientation:    alert and oriented x 4  Command Following:   impaired--inconsistent with participation and command following    Education  Barriers To Learning: cognition and hearing  Patient Education: patient educated on goals, PT role and benefits, plan of care, weight-bearing education, general safety, functional mobility training, transfer training, discharge recommendations  Learning Assessment:  patient verbalizes understanding, would benefit from continued reinforcement    Assessment  Activity Tolerance: limited due to increased pain with any mobility  Impairments Requiring Therapeutic Intervention: decreased functional mobility, decreased strength, decreased safety awareness, decreased cognition, decreased endurance, decreased balance  Prognosis: guarded  Clinical Assessment: Pt continues with a significant fear of falling. She also demonstrates decreased strength and balance and requires assistance of 2-3 people for all mobility or the use of lift equipment. Pt will continue to benefit from skilled PT services to address deficits and promote safety in the home environment.    Safety Interventions: patient left in chair, chair alarm in place, call light within reach, and nurse notified    Plan  Frequency: 5 x/week, 90 min/day  Current Treatment Recommendations: strengthening, ROM, balance training, functional mobility training, transfer training, gait training, stair training, endurance training, patient/caregiver education, safety education, and equipment evaluation/education    Goals  Patient Goals: to go home and be independent    Short Term Goals:  Time Frame: 2 weeks   Patient will complete supine<>sit transfer at moderate assistance   Patient will complete lateral transfer at moderate assistance   Patient will complete manual w/c propulsion 10 ft at moderate assistance    To be met in 3 weeks:  Patient will complete supine<>sit transfer at min A I  Patient will complete stand step transfer at min A  Patient will complete manual w/c propulsion 100 ft at min A  Patient will tolerate ambulation x 10' with use of LRAD with mod A     Therapy Session Time      Individual Group Co-treatment   Time In      730   Time Out      830   Minutes      60       Second Session Therapy Time:   Individual Concurrent Group Co-treatment   Time In  1400      1407   Time Out  3639      7423   Minutes  7      38         Timed Code Treatment Minutes:  60+7+38      Total Treatment Minutes:  105      Electronically Signed By: Elizabeth Barraza PT

## 2022-10-20 NOTE — PROGRESS NOTES
Bonnetta Kayser  10/20/2022  [id number]    Chief Complaint: Left supracondylar humerus fracture, sequela    Subjective:   No overnight events. Pain continues to limit therapy. Slow progress in therapy. ROS: No CP, SOB, dyspnea    Objective:  Patient Vitals for the past 24 hrs:   BP Temp Temp src Pulse Resp SpO2   10/20/22 0751 -- -- -- -- 18 --   10/19/22 2015 129/62 97.6 °F (36.4 °C) -- 73 16 93 %   10/19/22 1945 -- -- -- -- 16 --   10/19/22 1907 -- -- -- -- 16 --   10/19/22 1459 -- -- -- -- 16 --   10/19/22 1424 -- -- -- -- 16 --   10/19/22 0904 127/68 97.6 °F (36.4 °C) Oral 82 16 95 %       Gen: No distress, pleasant. Resting in bedside chair  HEENT: Normocephalic, atraumatic. CV: Regular rate and rhythm. No MRG   Resp: No respiratory distress. CTAB   Abd: Soft, nontender nondistended  Ext: No edema. LUE in sling with post op dressing  Neuro: Alert, oriented, appropriately interactive. Laboratory data: Available via EMR. Therapy progress:    PT    Supine to Sit: Dependent  Sit to Supine: Dependent   Sit to Stand: Dependent  Chair/Bed to Chair Transfer: Dependent  Car Transfer:    Ambulation 10 ft:    Ambulation 50 ft:    Ambulation 150 ft:    Stairs - 1 Step:    Stairs - 4 Step:    Stairs - 12 Step:      OT    Eating: Supervision or touching assistance  Oral Hygiene: Supervision or touching assistance  Bathing: Substantial/maximal assistance  Upper Body Dressing: Dependent  Lower Body Dressing: Dependent  Toilet Transfer: Dependent  Toilet Hygiene: Dependent    Speech Therapy         Body mass index is 27.12 kg/m². Assessment:  Patient Active Problem List   Diagnosis    Closed displaced fracture of surgical neck of humerus, initial encounter    Osteoporosis with current pathological fracture, initial encounter    Hyponatremia    Left supracondylar humerus fracture, sequela       Plan:   Displaced left proximal humeral fracture: Left reverse shoulder arthroplasty on 10/12 with Dr Margarito Plata. MIRIAM DYER. Continue sling. Acute on chronic hyponatremia-Nephrology on board, restarted back on salt tab. Fluid restriction. Urea     Hypertension: nifedipine XL     Bowel: Per protocol  Bladder: Per protocol  Sleep: Trazodone PRN  Pain: tylenol, scheduled, tramadol PRN  DVT PPx: lovenox  JAIRO: 10/26 - [facility]    Team conference was held today on the patient and discussed directly with the patient utilizing their entire treatment team. Please see separate team note for details. Total treatment time for today's care >35 min. Liv Dee MD 10/20/2022, 8:30 AM    * This document was created using dictation software. While all precautions were taken to ensure accuracy, errors may have occurred. Please disregard any typographical errors.

## 2022-10-20 NOTE — CARE COORDINATION
Patient and her daughter requesting that patient be discharged to [place]. [facility]. SW faxed referral via Epic and it failed. SW attempted to contact admission coordinator. Admission Coordinator not available and SW had to leave a VM message. Will continue to follow. Electronically signed by MALINDA Hernández on 10/20/2022 at 1:21 PM     ADDENDUM:    MYAH spoke with Jimi Porras from [place]. [facility] Facility has no available beds.     Electronically signed by MALINDA Hernández on 10/20/2022 at 1:39 PM

## 2022-10-20 NOTE — PLAN OF CARE
Problem: Discharge Planning  Goal: Discharge to home or other facility with appropriate resources  10/20/2022 0108 by Shila Perez RN  Outcome: Progressing     Problem: Skin/Tissue Integrity  Goal: Absence of new skin breakdown  Description: 1. Monitor for areas of redness and/or skin breakdown  2. Assess vascular access sites hourly  3. Every 4-6 hours minimum:  Change oxygen saturation probe site  4. Every 4-6 hours:  If on nasal continuous positive airway pressure, respiratory therapy assess nares and determine need for appliance change or resting period.   10/20/2022 0108 by Shila Perez RN  Outcome: Progressing     Problem: Safety - Adult  Goal: Free from fall injury  10/20/2022 0108 by Shila Perez RN  Outcome: Progressing     Problem: ABCDS Injury Assessment  Goal: Absence of physical injury  10/20/2022 0108 by Shila Perez RN  Outcome: Progressing     Problem: Pain  Goal: Verbalizes/displays adequate comfort level or baseline comfort level  10/20/2022 0108 by Shila Perez RN  Outcome: Progressing

## 2022-10-20 NOTE — PROGRESS NOTES
[facility]   Nephrology progress Note  [phone]   [facility]. [employer]           SUMMARY :  We are following this patient for hyponatremia  80year-old female with past medical history of chronic hyponatremia, hypertension, CHF, presented to the hospital after a fall. At that time her serum sodium was 126. She sees my partner Dr. Birdena Romberg  for chronic hyponatremia. SUBJECTIVE:   Patient progress reviewed. The patient has no new complaints, no family by bedside    Physical Exam:    VITALS:  BP (!) 103/59   Pulse 75   Temp 97.5 °F (36.4 °C) (Oral)   Resp 17   Ht 5' 5\" (1.651 m)   Wt 163 lb (73.9 kg)   SpO2 95%   BMI 27.12 kg/m²   BLOOD PRESSURE RANGE:  Systolic (76YTK), KCY:531 , Min:103 , MYR:144   ; Diastolic (48TVU), KPE:45, Min:59, Max:62    24HR INTAKE/OUTPUT:    Intake/Output Summary (Last 24 hours) at 10/20/2022 1059  Last data filed at 10/20/2022 0707  Gross per 24 hour   Intake 120 ml   Output 1100 ml   Net -980 ml         Gen:  alert, oriented x 2, hard of hearing  PERRL , EOM +  Pallor +, No icterus  JVP not raised   CV: RRR no murmur or rub . Lungs:B/ L air entry, Normal breath sounds   Abd: soft, bowel sounds + , No organomegaly   Ext: No edema, no cyanosis  Skin: Warm.   No rash  Neuro: nonfocal.      DATA:    CBC with Differential:    Lab Results   Component Value Date/Time    WBC 5.9 10/19/2022 06:10 AM    RBC 2.97 10/19/2022 06:10 AM    HGB 8.6 10/19/2022 06:10 AM    HCT 25.7 10/19/2022 06:10 AM     10/19/2022 06:10 AM    MCV 86.6 10/19/2022 06:10 AM    MCH 29.0 10/19/2022 06:10 AM    MCHC 33.4 10/19/2022 06:10 AM    RDW 14.4 10/19/2022 06:10 AM    LYMPHOPCT 15.3 10/10/2022 02:00 PM    MONOPCT 4.2 10/10/2022 02:00 PM    BASOPCT 0.6 10/10/2022 02:00 PM    MONOSABS 0.4 10/10/2022 02:00 PM    LYMPHSABS 1.5 10/10/2022 02:00 PM    EOSABS 0.0 10/10/2022 02:00 PM    BASOSABS 0.1 10/10/2022 02:00 PM     CMP:    Lab Results   Component Value Date/Time    NA 128 10/20/2022 06:59 AM    K 4.1 10/20/2022 06:59 AM    K 4.1 10/10/2022 02:00 PM    CL 94 10/20/2022 06:59 AM    CO2 27 10/20/2022 06:59 AM    BUN 17 10/20/2022 06:59 AM    CREATININE <0.5 10/20/2022 06:59 AM    GFRAA >60 10/17/2022 05:22 AM    AGRATIO 1.2 10/10/2022 02:00 PM    LABGLOM >60 10/20/2022 06:59 AM    GLUCOSE 86 10/20/2022 06:59 AM    PROT 6.9 10/10/2022 02:00 PM    LABALBU 3.8 10/10/2022 02:00 PM    CALCIUM 9.3 10/20/2022 06:59 AM    BILITOT 0.4 10/10/2022 02:00 PM    ALKPHOS 69 10/10/2022 02:00 PM    AST 16 10/10/2022 02:00 PM    ALT 11 10/10/2022 02:00 PM     Magnesium:    Lab Results   Component Value Date/Time    MG 2.00 10/15/2022 05:59 AM     Phosphorus:  No results found for: PHOS  Uric Acid:  No results found for: Julane Riser  U/A:    Lab Results   Component Value Date/Time    COLORU Yellow 10/10/2022 07:00 PM    PROTEINU Negative 10/10/2022 07:00 PM    PHUR 6.0 10/10/2022 07:00 PM    WBCUA 2 10/10/2022 07:00 PM    RBCUA 1 10/10/2022 07:00 PM    BACTERIA None Seen 10/10/2022 07:00 PM    CLARITYU Clear 10/10/2022 07:00 PM    SPECGRAV 1.025 10/10/2022 07:00 PM    LEUKOCYTESUR SMALL 10/10/2022 07:00 PM    UROBILINOGEN 1.0 10/10/2022 07:00 PM    BILIRUBINUR Negative 10/10/2022 07:00 PM    BLOODU Negative 10/10/2022 07:00 PM    GLUCOSEU Negative 10/10/2022 07:00 PM         IMPRESSION/RECOMMENDATIONS:      Diagnosis and Plan     Hyponatremia:  SIADH  U sodium 72,  urine osmolality 659   plasma osmolality 263  AM cortisol 10.3, TSH 1.45   Started urea 10/18/22, salt tablets stopped 10/19/22  Na dropped to 128  ( was 132)   Add fluid restriction 1800 ml/24 hrs      Hypertension: low normal, Hold parameters for medication   Left shoulder fracture s/p reverse left shoulder arthroplasty with tuberosity fixation on 10/12/2022:    Timbo Valadez MD

## 2022-10-20 NOTE — PROGRESS NOTES
[facility]   Phone: [phone]    Occupational Therapy    [] Initial Evaluation            [x] Daily Treatment Note         [] Discharge Summary      Patient: Shirin Dobson   : 1934   MRN: [id number]   Date of Service:  10/20/2022    Admitting Diagnosis:  Left supracondylar humerus fracture, sequela  Current Admission Summary:  68-year-old female with PMHx of CHF, hypertension, chronic hyponatremia and SIADH presented after sustaining a fall while going to the bathroom. Patient reported pain in her left shoulder and right hip. X-ray revealed a communicated fracture of left humeral neck with humeral shaft displaced. CT head negative for any acute intracranial pathology. Patient taken to surgery by Dr. Stella Sanchez on 10/12 for a repair of her Left comminuted and displaced proximal humerus fracture with a Left reverse shoulder arthroplasty with tuberosity fixation. Post op, patient started in therapies, and patient is able to tolerate 3 hours of therapy 5 days per week and is medically appropriate for rehab on the Acute Rehabilitation Unit. Patient has regular Medicare insurance. She is agreeable to rehab unit treatment. Patient lives at home with her daughter in a two-level house, but she does have a stair lift system in place. Past Medical History:  has a past medical history of CHF (congestive heart failure) ([address] and Hypertension. Past Surgical History:  has a past surgical history that includes Cholecystectomy;  section; and shoulder surgery (Left, 10/12/2022).     Discharge Recommendations:24/7 Assist and SUP, HHOT S3    DME Required For Discharge: DME to be determined pending patient progress, ashutosh lift and BSC    Precautions/Restrictions: high fall risk, contact precautions, weight bearing, ROM restrictions  Weight Bearing Restrictions: non weight bearing  [] Right Upper Extremity  [x] Left Upper Extremity [] Right Lower Extremity  [] Left Lower Extremity     Required Braces/Orthotics:  abductor sling   [] Right  [x] Left  Positional Restrictions: no PROM or AROM L shoulder movement, okay for AROM elbow/forearm, wrist and hand  Comment: per Martín Caballero note on 10/17/22:   - WB status:  NWB; continue sling - reviewed post op precautions. Exercises handout provided. Keep a pillow beneath the patient's elbow to keep the forearm in front of the body (do not allow the elbow to slide backwards onto the bed/chair). Loosen the sling 2x/day and have patient perform gentle elbow ROM, forearm rotation, and wrist ROM. NO shoulder ROM at all.     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 which prevents hip flexion/extension, [place] [employer] works from home)             Type of Home: house  Home Layout: two level, 1/2 bath on main level, bedroom/bathroom upstairs, chair lift to 2nd level  Home Access:  2 step to enter without rails , has portable ramp   Bathroom Layout: tub/shower unit, walk in shower, uses [employer] is unsure if transport chair can go in/out of bathroom  Bathroom Equipment: grab bars in shower, shower chair, toilet raiser  Toilet Height: standard height  Home Equipment: rolling walker, transport wheelchair, reacher, oxygen only at night; purewick at [employer]: pt sleeps in a standard bed with arm rails  Transfer Assistance: Modified Independent with [address] [facility] independent with use of FWW in house, w/c in [address] [place] [address]: requires assistance with toileting, dressing, and bathing, dtr provides setup assistance for grooming tasks and feeing  IADL Assistance: requires assistance with all homemaking tasks  Active :        [] Yes                 [x] No daughter provides transportation  Hand Dominance: [] Left                 [x] Right  Hobbies: enjoys reading the paper, watching TV  Recent Falls: 1 recent fall in last 6 months prior to this fall that lead to this admission  Comment: per chart review-  pt came to hospital ED on 9/1/22 5/6/22, and 11/3/21 for falls, pt with recent rehab stay at [facility] in September 2022 (pt there for a few days)      Subjective  General: Pt supine in bed upon arival, agreeable to OT/PT co tx. Pt's dtr present throughout session. OT donned/doffed yellow isolation gowns per contact precautions. Pt with sling donned with LUE supported by pillow. Pain: 7/10. Location: L shoulder  Pain Interventions: pain medication in place prior to arrival, RN notified of patient request for pain medication, and repositioned       Activities of Daily Living  Basic Activities of Daily Living  Lower Extremity Dressing: dependent requires verbal cueing Increased time to complete task  Dressing Comments: max A for rolling supine in bed with A to pull up pants, dep for tabbed brief, dep for socks   Toileting: dependent. Toileting Comments: purewick, incontinent of urine and stool, purewick removed d/t maintain skin integrity  General Comments: C/o pain and nausea throughout, pt verbalized fear of falling  Instrumental Activities of Daily Living  No IADL completed on this date.   Functional Mobility  Bed Mobility  Supine to Sit: maximum assistance  Rolling Left: maximum assistance  Rolling Right: maximum assistance  Scooting: maximum assistance  Comment: partial roll to left and not complete roll d/t s/p L reverse total shoulder arthroplasty, pt's dtr assisting with rolling during dressing  Transfers  Sit to stand transfer:2 person assistance with max Ax2 , 3x incomplete stand d/t poor posture/hunching forward with use of GB in RUE, pt's dtr actively participating in third stand, pt stating she does not want to stand and refusing additional trails, pt's dtr educated on proper transfer techniques to facilitate performance and prevent injury  Stand to sit transfer: 2 person assistance with max Ax2 , from partial stand  Stand pivot transfer: dependent assistance, 2 person assistance with max Ax2   Bed / Chair transfer: dependent assistance, kayli. Bed / Chair comments: Romy Luong elvis to tilt table and then from tilt table to recliner  Comment: pt c/o pain throughout t/f and fear of falling, max encouragement provided, pt and dtr edu on use of ashutosh lift at home, dtr stating she will not be able to get a ashutosh lift for home use  Functional Mobility  Standing Balance: dependent assistance, tilt table ~60*-70*. Standing Balance Comment: partial  tilt table ~15 minutes with c/o pain throughout and stating she could not stand completley upright  No functional mobility completed on this date secondary to safety as pt requires max Ax2 to stand. Therapeutic Intervention  While in stance on tilt table (dep for balance as indicated above), pt completed 1 set of 10x in RUE:  shoulder shrug, elbow flexion/extension, and chest press. While pt's LUE was in sling pt completed 10x: wrist flexion/extension and wrist circumduction. Pt required max cues throughout session for encouragement and active participation. Pt educated on purpose of therapeutic task, pt stated \"I am done. Aminah Momin I don't want to do this. .. I hate this \". Pt's dtr provided encouragement throughout. Pt is resistive to OT education as she stated \"you are preaching to the choir. ..you don't have to do that\" in response to education. Second Session:  Upon OT arrival, pt seated upright in recliner. Agreeable to OT/PT co-treat with max cues of encouragement. Pt requires co-tx secondary to complexity of condition, decreased activity tolerance and increased assistance for ADL/mobility. Pt benefits from x 2 skilled hands to provide increased cues/feedback and promote improved safety and performance with ADLs. Pt stated she had a episode of incontinence, pt attempting to  [address] [place] [address] with max Ax2, however unsuccessful as pt attempting to use LUE to hold self upright. Pt then sat.  Attempting to  the jing stedy again with max A x3 with max verbal encouragement. Pt used jing zhang to t/f from recliner to bed. Pt sit to supine in bed with max Ax2. Pt dependent for brief change with max Ax1 for rolling as needed. Pt requesting to hold her dtr's hand during tasks d/t fear. Pt then c/o therapy in the morning d/t therapist attempting to stand as pt stating therapy is \"going to fast\". Pt then completed AROM in LUE wrist and elbow. Pt then completed LB AROM with PT-please refer to PT note for clarification. Pt left in bed with bed alarm in place, call light within reach, all needs met, and caregiver present.      Cognition  Overall Cognitive Status: Impaired  Arousal/Alterness: appropriate responses to stimuli  Following Commands: follows one step commands with repetition, follows one step commands with increased time  Attention Span: difficulty attending to directions, difficulty dividing attention  Memory: decreased recall of recent events, decreased short term memory, decreased long term memory  Safety Judgement: decreased awareness of need for assistance, decreased awareness of need for safety  Problem Solving: assistance required to generate solutions, assistance required to implement solutions, decreased awareness of errors, assistance required to identify errors made, assistance required to correct errors made  Insights: decreased awareness of deficits  Initiation: requires cues for all  Sequencing: requires cues for all  Comments: self-limiting, anxious, fearful  Orientation:    oriented to person, oriented to place, oriented to time, oriented to situation, and disoriented to situation cues to remind pt regarding shoulder surgery, pt states \"my shoulder hurts\" but has difficulties stating why  Command Following:   impaired--inconsistent with participation and command following     Education  Barriers To Learning: cognition, hearing, emotional, and physical  Patient Education: patient educated on goals, OT role and benefits, plan of care, precautions, weight-bearing education, family education, transfer training, discharge recommendations  Learning Assessment:  patient verbalizes understanding, would benefit from continued reinforcement    Assessment  Activity Tolerance:poor d/t pain and anxiety/fear  Impairments Requiring Therapeutic Intervention: decreased functional mobility, decreased ADL status, decreased safety awareness, decreased cognition, decreased endurance, decreased balance, increased pain, decreased posture  Prognosis: fair  Clinical Assessment: Pt is not at baseline level of function as she is dependent for dressing, bathing, and toileting. Pt requires SBA for grooming tasks. Pt requires assist of 2-3 people for functional transfers. Pt is nauseas, c/o pain, and verbalizes fear of falling throughout session. Pt will benefit from skilled OT to maximize safety and occupational performance as well as reduce caregiver burden.   Safety Interventions: patient left in chair, chair alarm in place, call light within reach, gait belt, patient at risk for falls, nurse notified, and family/caregiver present    Plan  Frequency: 5 x/week, 90 min/day  Current Treatment Recommendations: strengthening, balance training, functional mobility training, transfer training, endurance training, patient/caregiver education, ADL/self-care training, IADL training, home management training, pain management, and safety education    Goals  Patient Goals: Go home   Short Term Goals:  Time Frame: 3 weeks  Patient will complete upper body ADL at minimal assistance   Patient will complete lower body ADL at minimal assistance   Patient will complete toileting at modified independent   Patient will complete self-feeding at Independent   Patient will complete grooming at Independent   Patient will complete functional transfers at modified independent   Patient will complete functional mobility at modified independent    -Goals not yet met this date        Therapy Session Time  First Therapy Time:   Individual Group Co-treatment   Time In    730   Time Out    830   Minutes   60      Second Therapy Time:   Individual Group Co-treatment   Time In   1407   Time Out   1445   Minutes   38     Timed Code Treatment Minutes: 86+81    Total Treatment Minutes: 98 minutes       Electronically Signed By: Erick Araujo [id number]

## 2022-10-20 NOTE — PLAN OF CARE
Problem: Discharge Planning  Goal: Discharge to home or other facility with appropriate resources  10/20/2022 1135 by Devika Aguayo RN  Outcome: Progressing  10/20/2022 0108 by Jennifer Sumner RN  Outcome: Progressing     Problem: Skin/Tissue Integrity  Goal: Absence of new skin breakdown  Description: 1. Monitor for areas of redness and/or skin breakdown  2. Assess vascular access sites hourly  3. Every 4-6 hours minimum:  Change oxygen saturation probe site  4. Every 4-6 hours:  If on nasal continuous positive airway pressure, respiratory therapy assess nares and determine need for appliance change or resting period.   10/20/2022 1135 by Devika Aguayo RN  Outcome: Progressing  10/20/2022 0108 by Jennifer Sumner RN  Outcome: Progressing     Problem: Safety - Adult  Goal: Free from fall injury  10/20/2022 1135 by Devika Aguayo RN  Outcome: Progressing  10/20/2022 0108 by Jennifer Sumner RN  Outcome: Progressing     Problem: ABCDS Injury Assessment  Goal: Absence of physical injury  10/20/2022 1135 by Devika Aguayo RN  Outcome: Progressing  10/20/2022 0108 by Jennifer Sumner RN  Outcome: Progressing     Problem: Pain  Goal: Verbalizes/displays adequate comfort level or baseline comfort level  10/20/2022 1135 by Devika Aguayo RN  Outcome: Progressing  10/20/2022 0108 by Jennifer Sumner RN  Outcome: Progressing     Problem: Nutrition Deficit:  Goal: Optimize nutritional status  Outcome: Progressing     Problem: Chronic Conditions and Co-morbidities  Goal: Patient's chronic conditions and co-morbidity symptoms are monitored and maintained or improved  Outcome: Progressing

## 2022-10-20 NOTE — CARE COORDINATION
SW spoke with patient's daughter Abhay Lung and informed [facility] that [facility], no available beds. Patient's daughter requesting a SNF list so that she/patient can select another(s) facility. SW left list in patient's room. MYAH will follow.     Electronically signed by MALINDA Will on 10/20/2022 at 4:27 PM

## 2022-10-20 NOTE — CARE COORDINATION
Team conference held today. Spoke with patient and her daughter to discuss progress with therapy, barriers to discharge, and plans to return home. Estimated discharge date set for 10/26/2022 or before. Patient anticipates discharging to a SNF. Will continue to follow for support and discharge planning.     Electronically signed by MALINDA Cm on 10/20/2022 at 1:19 PM

## 2022-10-21 LAB
ANION GAP SERPL CALCULATED.3IONS-SCNC: 12 MMOL/L (ref 3–16)
BUN BLDV-MCNC: 20 MG/DL (ref 7–20)
CALCIUM SERPL-MCNC: 9.3 MG/DL (ref 8.3–10.6)
CHLORIDE BLD-SCNC: 95 MMOL/L (ref 99–110)
CO2: 22 MMOL/L (ref 21–32)
CREAT SERPL-MCNC: <0.5 MG/DL (ref 0.6–1.2)
GFR SERPL CREATININE-BSD FRML MDRD: >60 ML/MIN/{1.73_M2}
GLUCOSE BLD-MCNC: 93 MG/DL (ref 70–99)
HCT VFR BLD CALC: 26.2 % (ref 36–48)
HEMOGLOBIN: 8.7 G/DL (ref 12–16)
MCH RBC QN AUTO: 29 PG (ref 26–34)
MCHC RBC AUTO-ENTMCNC: 33.2 G/DL (ref 31–36)
MCV RBC AUTO: 87.1 FL (ref 80–100)
PDW BLD-RTO: 14.7 % (ref 12.4–15.4)
PLATELET # BLD: 170 K/UL (ref 135–450)
PMV BLD AUTO: 6.9 FL (ref 5–10.5)
POTASSIUM SERPL-SCNC: 4.3 MMOL/L (ref 3.5–5.1)
RBC # BLD: 3.01 M/UL (ref 4–5.2)
SODIUM BLD-SCNC: 129 MMOL/L (ref 136–145)
WBC # BLD: 6 K/UL (ref 4–11)

## 2022-10-21 PROCEDURE — 6370000000 HC RX 637 (ALT 250 FOR IP): Performed by: INTERNAL MEDICINE

## 2022-10-21 PROCEDURE — 80048 BASIC METABOLIC PNL TOTAL CA: CPT

## 2022-10-21 PROCEDURE — 97110 THERAPEUTIC EXERCISES: CPT

## 2022-10-21 PROCEDURE — 1280000000 HC REHAB R&B

## 2022-10-21 PROCEDURE — 97530 THERAPEUTIC ACTIVITIES: CPT

## 2022-10-21 PROCEDURE — 6370000000 HC RX 637 (ALT 250 FOR IP): Performed by: PHYSICAL MEDICINE & REHABILITATION

## 2022-10-21 PROCEDURE — 97535 SELF CARE MNGMENT TRAINING: CPT

## 2022-10-21 PROCEDURE — 85027 COMPLETE CBC AUTOMATED: CPT

## 2022-10-21 PROCEDURE — 6360000002 HC RX W HCPCS: Performed by: PHYSICAL MEDICINE & REHABILITATION

## 2022-10-21 RX ADMIN — ENOXAPARIN SODIUM 40 MG: 100 INJECTION SUBCUTANEOUS at 08:22

## 2022-10-21 RX ADMIN — NIFEDIPINE 30 MG: 30 TABLET, EXTENDED RELEASE ORAL at 08:21

## 2022-10-21 RX ADMIN — Medication 30 G: at 08:20

## 2022-10-21 RX ADMIN — CETIRIZINE HYDROCHLORIDE 5 MG: 10 TABLET, FILM COATED ORAL at 08:21

## 2022-10-21 RX ADMIN — TRAMADOL HYDROCHLORIDE 100 MG: 50 TABLET, FILM COATED ORAL at 13:45

## 2022-10-21 RX ADMIN — DIAZEPAM 1 MG: 2 TABLET ORAL at 08:03

## 2022-10-21 RX ADMIN — TRAMADOL HYDROCHLORIDE 100 MG: 50 TABLET, FILM COATED ORAL at 18:32

## 2022-10-21 RX ADMIN — ONDANSETRON 4 MG: 4 TABLET, ORALLY DISINTEGRATING ORAL at 03:50

## 2022-10-21 RX ADMIN — TRAMADOL HYDROCHLORIDE 50 MG: 50 TABLET ORAL at 06:10

## 2022-10-21 RX ADMIN — ACETAMINOPHEN 1000 MG: 500 TABLET ORAL at 08:21

## 2022-10-21 RX ADMIN — TRAZODONE HYDROCHLORIDE 50 MG: 50 TABLET ORAL at 22:05

## 2022-10-21 RX ADMIN — ACETAMINOPHEN 1000 MG: 500 TABLET ORAL at 15:14

## 2022-10-21 ASSESSMENT — PAIN SCALES - WONG BAKER
WONGBAKER_NUMERICALRESPONSE: 0

## 2022-10-21 ASSESSMENT — PAIN DESCRIPTION - ORIENTATION
ORIENTATION: LEFT

## 2022-10-21 ASSESSMENT — PAIN DESCRIPTION - PAIN TYPE
TYPE: ACUTE PAIN;SURGICAL PAIN
TYPE: SURGICAL PAIN;ACUTE PAIN
TYPE: ACUTE PAIN;SURGICAL PAIN
TYPE: SURGICAL PAIN

## 2022-10-21 ASSESSMENT — PAIN DESCRIPTION - DESCRIPTORS
DESCRIPTORS: ACHING;SHARP
DESCRIPTORS: ACHING
DESCRIPTORS: DISCOMFORT

## 2022-10-21 ASSESSMENT — PAIN DESCRIPTION - FREQUENCY
FREQUENCY: CONTINUOUS

## 2022-10-21 ASSESSMENT — PAIN SCALES - GENERAL
PAINLEVEL_OUTOF10: 6
PAINLEVEL_OUTOF10: 5
PAINLEVEL_OUTOF10: 5
PAINLEVEL_OUTOF10: 6
PAINLEVEL_OUTOF10: 1
PAINLEVEL_OUTOF10: 6
PAINLEVEL_OUTOF10: 5
PAINLEVEL_OUTOF10: 6
PAINLEVEL_OUTOF10: 2

## 2022-10-21 ASSESSMENT — PAIN DESCRIPTION - ONSET
ONSET: ON-GOING

## 2022-10-21 ASSESSMENT — PAIN DESCRIPTION - LOCATION
LOCATION: ARM

## 2022-10-21 NOTE — PROGRESS NOTES
[facility]   Phone: [phone]    Occupational Therapy    [] Initial Evaluation            [x] Daily Treatment Note         [] Discharge Summary      Patient: Delta Conner   : 1934   MRN: [id number]   Date of Service:  10/21/2022    Admitting Diagnosis:  Left supracondylar humerus fracture, sequela  Current Admission Summary:  71-year-old female with PMHx of CHF, hypertension, chronic hyponatremia and SIADH presented after sustaining a fall while going to the bathroom. Patient reported pain in her left shoulder and right hip. X-ray revealed a communicated fracture of left humeral neck with humeral shaft displaced. CT head negative for any acute intracranial pathology. Patient taken to surgery by Dr. Jeannette Pressley on 10/12 for a repair of her Left comminuted and displaced proximal humerus fracture with a Left reverse shoulder arthroplasty with tuberosity fixation. Post op, patient started in therapies, and patient is able to tolerate 3 hours of therapy 5 days per week and is medically appropriate for rehab on the Acute Rehabilitation Unit. Patient has regular Medicare insurance. She is agreeable to rehab unit treatment. Patient lives at home with her daughter in a two-level house, but she does have a stair lift system in place. Past Medical History:  has a past medical history of CHF (congestive heart failure) ([address] and Hypertension. Past Surgical History:  has a past surgical history that includes Cholecystectomy;  section; and shoulder surgery (Left, 10/12/2022).     Discharge Recommendations:24/7 Assist and SUP, ECF for continued therapy     DME Required For Discharge: DME to be determined pending patient progress, ashutosh lift and BSC    Precautions/Restrictions: high fall risk, contact precautions, weight bearing, ROM restrictions  Weight Bearing Restrictions: non weight bearing  [] Right Upper Extremity  [x] Left Upper Extremity [] Right Lower Extremity  [] Left Lower Extremity     Required Braces/Orthotics:  abductor sling   [] Right  [x] Left  Positional Restrictions: no PROM or AROM L shoulder movement, okay for AROM elbow/forearm, wrist and hand  Comment: pelon Curran note on 10/17/22:   - WB status:  NWB; continue sling - reviewed post op precautions. Exercises handout provided. Keep a pillow beneath the patient's elbow to keep the forearm in front of the body (do not allow the elbow to slide backwards onto the bed/chair). Loosen the sling 2x/day and have patient perform gentle elbow ROM, forearm rotation, and wrist ROM. NO shoulder ROM at all.     Prior Level of Function  Lives With: daughter  \"Samanta\" Rizwan Nguyen has arthritis in her R hip which prevents hip flexion/extension, Abelino Moya works from home)             Type of Home: house  Home Layout: two level, 1/2 bath on main level, bedroom/bathroom upstairs, chair lift to 2nd level  Home Access:  2 step to enter without rails , has portable ramp   Bathroom Layout: tub/shower unit, walk in shower, uses [employer] is unsure if transport chair can go in/out of bathroom  Bathroom Equipment: grab bars in shower, shower chair, toilet raiser  Toilet Height: standard height  Home Equipment: rolling walker, transport wheelchair, reacher, oxygen only at night; purewick at [employer]: pt sleeps in a standard bed with arm rails  Transfer Assistance: Modified Independent with [facility] independent with use of FWW in house, w/c in [address] [place] [address]: requires assistance with toileting, dressing, and bathing, dtr provides setup assistance for grooming tasks and feeing  IADL Assistance: requires assistance with all homemaking tasks  Active :        [] Yes                 [x] No daughter provides transportation  Hand Dominance: [] Left                 [x] Right  Hobbies: enjoys reading the paper, watching TV  Recent Falls: 1 recent fall in last 6 months prior to this fall transfer from supine in bed to reclining chair . Bed / Chair comments: use of Maxi elvis lift - pt complaint of nausea but agreeable to getting to chair lift     Functional Mobility  No functional mobility completed on this date secondary to unable to tolerate standing. Therapeutic Intervention      Second Session:  PM session - pt in chair on arrival - agreeable to therapy - reports pain in side when trying to stand - daughter reports that pt received her pain medications within the last half hour     UE and LE exercises in preparation for standing including: reaching down to ankles and up to ceiling, AROM grasp/release, wrist circles, thumb ab/adduction, reaching forward (see PT note for LE exercises)     Scooting forward in chair with dependent assist prior to attempted to stand   Max assist of 2 persons with daughter also assisting x 1 stand for about 10 seconds and 2 partial stands   Patient reports she feels like she is falling whenever she attempts to stand. After PT completed LE exercises in sitting therapist assisted to return patient to bed with maxi elvis lift - dep assist to place lift pad, max A in bed to roll to remove pad   Toileting - dep A from bed for pericare and clothing management due to incontinence of bladder and smear of bowel -discussed removed of pure wick due to frequent stooling on device with RN who was present in room for toileting/skin check end of session    In bed with alarm in place, needs in reach end of session. Daughter present throughout session and remained at bedside end of session.          Cognition  Overall Cognitive Status: Impaired  Arousal/Alterness: appropriate responses to stimuli  Following Commands: follows one step commands with repetition, follows one step commands with increased time  Attention Span: difficulty attending to directions, difficulty dividing attention  Memory: decreased recall of recent events, decreased short term memory, decreased long term memory  Safety Judgement: decreased awareness of need for assistance, decreased awareness of need for safety  Problem Solving: assistance required to generate solutions, assistance required to implement solutions, decreased awareness of errors, assistance required to identify errors made, assistance required to correct errors made  Insights: decreased awareness of deficits  Initiation: requires cues for all  Sequencing: requires cues for all  Comments: self-limiting, anxious, fearful  Orientation:    oriented to person, oriented to place, oriented to time, oriented to situation, and disoriented to situation cues to remind pt regarding shoulder surgery, pt states \"my shoulder hurts\" but has difficulties stating why  Command Following:   impaired--inconsistent with participation and command following     Education  Barriers To Learning: cognition, hearing, emotional, and physical  Patient Education: patient educated on goals, OT role and benefits, plan of care, precautions, weight-bearing education, family education, transfer training, discharge recommendations  Learning Assessment:  patient verbalizes understanding, would benefit from continued reinforcement    Assessment  Activity Tolerance:poor d/t pain and anxiety/fear  Impairments Requiring Therapeutic Intervention: decreased functional mobility, decreased ADL status, decreased safety awareness, decreased cognition, decreased endurance, decreased balance, increased pain, decreased posture  Prognosis: fair  Clinical Assessment: Pt is not at baseline level of function as she is dependent for dressing, bathing, and toileting. Pt requires SBA for grooming tasks. Pt requires assist of 2-3 people for functional transfers. Pt is nauseas, c/o pain, and verbalizes fear of falling throughout session. Pt will benefit from skilled OT to maximize safety and occupational performance as well as reduce caregiver burden.   Safety Interventions: patient left in chair, chair alarm in place, call light within reach, gait belt, patient at risk for falls, nurse notified, and family/caregiver present    Plan  Frequency: 5 x/week, 90 min/day  Current Treatment Recommendations: strengthening, balance training, functional mobility training, transfer training, endurance training, patient/caregiver education, ADL/self-care training, IADL training, home management training, pain management, and safety education    Goals  Patient Goals: Go home   Short Term Goals:  Time Frame: 3 weeks  Patient will complete upper body ADL at minimal assistance   Patient will complete lower body ADL at minimal assistance   Patient will complete toileting at modified independent   Patient will complete self-feeding at Independent   Patient will complete grooming at Independent   Patient will complete functional transfers at modified independent   Patient will complete functional mobility at modified independent    -Goals not yet met this date        Therapy Session Time  First Therapy Time:   Individual Group Co-treatment   Time In  0930  0830, 0900   Time Out  0940  0840, 0930   Minutes 10  40      Second Therapy Time:   Individual Group Co-treatment   Time In   1415, 1500   Time Out   1445, 1515   Minutes   45     Timed Code Treatment Minutes: 95    Total Treatment Minutes: 95       Electronically Signed By: Felicia Calderon OT   OT 2117  Taj ROGERS/BENITA [id number], 10/21/2022, 3:41 PM

## 2022-10-21 NOTE — PLAN OF CARE
Problem: Skin/Tissue Integrity  Goal: Absence of new skin breakdown  Description: 1. Monitor for areas of redness and/or skin breakdown  2. Assess vascular access sites hourly  3. Every 4-6 hours minimum:  Change oxygen saturation probe site  4. Every 4-6 hours:  If on nasal continuous positive airway pressure, respiratory therapy assess nares and determine need for appliance change or resting period.   10/20/2022 2229 by Robbie Wei RN  Outcome: Progressing     Problem: Safety - Adult  Goal: Free from fall injury  10/20/2022 2229 by Robbie Wei RN  Outcome: Progressing     Problem: Pain  Goal: Verbalizes/displays adequate comfort level or baseline comfort level  10/20/2022 2229 by Robbei Wei RN  Outcome: Progressing

## 2022-10-21 NOTE — PROGRESS NOTES
[facility] [facility]   Phone: [phone]    Physical Therapy    [] Initial Evaluation            [x] Daily Treatment Note         [] Discharge Summary      Patient: Patricia Hernandez   : 1934   MRN: [id number]   Date of Service:  10/21/2022  Admitting Diagnosis: Left supracondylar humerus fracture, sequela  Current Admission Summary: 70-year-old female with PMHx of CHF, hypertension, chronic hyponatremia and SIADH presented after sustaining a fall while going to the bathroom. Patient reported pain in her left shoulder and right hip. X-ray revealed a communicated fracture of left humeral neck with humeral shaft displaced. CT head negative for any acute intracranial pathology. Patient taken to surgery by Dr. Kike Bethea on 10/12 for a repair of her Left comminuted and displaced proximal humerus fracture with a Left reverse shoulder arthroplasty with tuberosity fixation. Post op, patient started in therapies, and patient is able to tolerate 3 hours of therapy 5 days per week and is medically appropriate for rehab on the Acute Rehabilitation Unit. Patient has regular Medicare insurance. She is agreeable to rehab unit treatment. Patient lives at home with her daughter in a two-level house, but she does have a stair lift system in place. Past Medical History:  has a past medical history of CHF (congestive heart failure) ([address] and Hypertension. Past Surgical History:  has a past surgical history that includes Cholecystectomy;  section; and shoulder surgery (Left, 10/12/2022).   Discharge Recommendations: home with 24 hour assist vs SNF  DME Required For Discharge: wheelchair, mechanical lift  Precautions/Restrictions: high fall risk  Weight Bearing Restrictions: non weight bearing  [] Right Upper Extremity  [x] Left Upper Extremity [] Right Lower Extremity  [] Left Lower Extremity     Required Braces/Orthotics: LUE abductor sling   [] Right  [x] Left  Positional Restrictions:no PROM or AROM L shoulder movement, okay for AROM elbow/forearm, wrist and hand    Lives With: daughter               Type of Home: house  Home Layout: two level, 1/2 bath on main level, bedroom/bathroom upstairs, chair lift to 2nd level  Home Access:  2 step to enter without rails , has portable ramp   Bathroom Layout: tub/shower unit, walk in shower, uses WIS  Bathroom Equipment: grab bars in shower, shower chair, toilet raiser  Toilet Height: standard height  Home Equipment: rolling walker, manual wheelchair, reacher, oxygen only at night; purewick at nights  Transfer Assistance: Independent without use of device  Ambulation Assistance:modified independent with use of FWW in house, w/c in community  ADL Assistance: independent with all ADL's  IADL Assistance: requires assistance with all homemaking tasks, requires assistance for medication management  Active :        [] Yes                 [x] No daughter provides transportation  Hand Dominance: [] Left                 [x] Right  Hobbies: enjoys reading the paper, watching TV  Recent Falls: 1 recent fall in last 6 months prior to this fall that lead to this admission           Subjective  General: Patient is in semi-fowlers in bed upon PT/OT arrival, daughter present, pt initially refusing therapy due to nausea and not wanting to do it. Daughter states pt already had zofran. Pain: 7/10. Location: L shoulder  Pain Interventions: RN notified of patient request for pain medication       Functional Mobility  Bed Mobility  Rolling Left: maximum assistance  Rolling Right: maximum assistance  Scootin person assistance with max A x 2   Comments:  Transfers  Dependent transfer completed with mechanical maxisky/maximove lift system from bed to recliner. Comments:  Ambulation  Ambulation not tested on this date secondary to patient requires maximal assistance to stand.   Distance:   Gait Mechanics:   Comments:    Stair Mobility  Stair mobility not completed on this date. Comments:  Wheelchair Mobility:  No w/c mobility completed on this date. Comments:  Balance  Static Sitting Balance: fair (+): maintains balance at SBA/supervision without use of UE support  Dynamic Sitting Balance: fair (+): maintains balance at SBA/supervision without use of UE support  Static Standing Balance: poor (-): requires max (A) to maintain balance  Dynamic Standing Balance: poor (-): requires max (A) to maintain balance  Comments:        Other Therapeutic Interventions    1st session:  Assisted with sponge bath in bed. Performed rolling in bed as documented above for LB dressing and pericare. Pt transferred to recliner with alarm on and all needs in reach. 2nd session:  Pt in recliner on arrival.  Performed seated exercises BLE and BUE to prepare for standing. Performed STS X 3 trials from chair with maxA of 2. Pt able to maintain standing X 5-10 seconds with maxA of 2 to maintain standing and significant flexion posture with this PT observing shoulder precautions on LUE. Performed seated LAQ, marching, hip abduction, hip adduction, elbow flexion/extension, ankle dorsiflexion/plantarflexion. Performed maxi-elvis transfer from chair to bed for pericare. Performed rolling maxA B and dependent pericare. Pt remained in bed with alarm on and all needs in reach.      Functional Outcomes                 Cognition  Overall Cognitive Status: Impaired  Arousal/Alterness: appropriate responses to stimuli  Following Commands: follows one step commands with repetition, follows one step commands with increased time  Safety Judgement: decreased awareness of need for assistance  Initiation: requires cues for some  Orientation:    alert and oriented x 4  Command Following:   impaired--inconsistent with participation and command following    Education  Barriers To Learning: cognition and hearing  Patient Education: patient educated on goals, PT role and benefits, plan of care, weight-bearing education, general safety, functional mobility training, transfer training, discharge recommendations  Learning Assessment:  patient verbalizes understanding, would benefit from continued reinforcement    Assessment  Activity Tolerance: limited due to increased pain with any mobility  Impairments Requiring Therapeutic Intervention: decreased functional mobility, decreased strength, decreased safety awareness, decreased cognition, decreased endurance, decreased balance  Prognosis: guarded  Clinical Assessment: Pt remains limited by nausea and decreased awareness into deficits and benefits of therapy, despite encouragement and education. Pt continues to require significant assistance of 2 people or the use of lift equipment for all functional mobility. Pt will continue to benefit from skilled PT services to address deficits and promote safety in the home environment.    Safety Interventions: patient left in chair, chair alarm in place, call light within reach, and nurse notified    Plan  Frequency: 5 x/week, 90 min/day  Current Treatment Recommendations: strengthening, ROM, balance training, functional mobility training, transfer training, gait training, stair training, endurance training, patient/caregiver education, safety education, and equipment evaluation/education    Goals  Patient Goals: to go home and be independent    Short Term Goals:  Time Frame: 2 weeks   Patient will complete supine<>sit transfer at moderate assistance   Patient will complete lateral transfer at moderate assistance   Patient will complete manual w/c propulsion 10 ft at moderate assistance    To be met in 3 weeks:  Patient will complete supine<>sit transfer at min A I  Patient will complete stand step transfer at min A  Patient will complete manual w/c propulsion 100 ft at min A  Patient will tolerate ambulation x 10' with use of LRAD with mod A     Therapy Session Time      Individual Group Co-treatment   Time In      830 Time Out      840   Minutes      10       Second Session Therapy Time:   Individual Concurrent Group Co-treatment   Time In        900   Time Out        930   Minutes        30     Third Session Therapy Time:   Individual Concurrent Group Co-treatment   Time In  6988      1967, 1500   Time Out  1500      1445, 1515   Minutes  15      30, 15         Timed Code Treatment Minutes:  42+37+17      Total Treatment Minutes:  100      Electronically Signed By: Kate Calixto, PT

## 2022-10-21 NOTE — PLAN OF CARE
Problem: Skin/Tissue Integrity  Goal: Absence of new skin breakdown  Description: 1. Monitor for areas of redness and/or skin breakdown  2. Assess vascular access sites hourly  3. Every 4-6 hours minimum:  Change oxygen saturation probe site  4. Every 4-6 hours:  If on nasal continuous positive airway pressure, respiratory therapy assess nares and determine need for appliance change or resting period.   10/21/2022 0957 by Deisi Barger RN  Outcome: Progressing  10/20/2022 2229 by Calista Fagan RN  Outcome: Progressing     Problem: Safety - Adult  Goal: Free from fall injury  10/21/2022 0957 by Deisi Barger RN  Outcome: Progressing  10/20/2022 2229 by Calista Fagan RN  Outcome: Progressing

## 2022-10-21 NOTE — PROGRESS NOTES
Nutrition Note    RECOMMENDATIONS  PO Diet: Regular, 1800 mL fl res    NUTRITION ASSESSMENT   Follow up. On regular diet with 1800 mL fluid restricition with PO consistently % of meals. During visit, pt reports nausea in the morning which normally subsides once she eats. Reports appetite is strong and family brings in snacks for between meals. No recent weight loss. Family reports pt has good intake of protein to support wound healing from surgery. Nutrition Related Findings: Na+ 129; loose BM 10/19; Non-pitting generalized, BLE, LUE edema. Wounds: Surgical Incision (L shoulder)  Nutrition Education:  Education not indicated   Nutrition Goals: PO intake 75% or greater     MALNUTRITION ASSESSMENT   Malnutrition Status: No malnutrition      NUTRITION DIAGNOSIS   Increased nutrient needs related to increase demand for energy/nutrients as evidenced by s/p L shoulder arthroplasty      CURRENT NUTRITION THERAPIES  ADULT DIET; Regular; 1800 ml     PO Intake: %   PO Supplement Intake:None Ordered      ANTHROPOMETRICS  Current Height: 5' 5\" (165.1 cm)  Current Weight: 163 lb (73.9 kg)    Admission weight: 163 lb (73.9 kg) (bed wt)  Ideal Body Weight (IBW): 125 lbs  (57 kg)        BMI: 27.1      The patient will be monitored per nutrition standards of care. Consult dietitian if additional nutrition interventions are needed prior to RD reassessment.      Markos Rodriges, [address],     Contact: [phone]

## 2022-10-21 NOTE — CARE COORDINATION
SW informed by Serena Acosta PT that patient would like to be referred to [facility]. SW referred patient via [email or web address]. SW will follow.     Electronically signed by MALINDA Patel on 10/21/2022 at 9:22 AM

## 2022-10-21 NOTE — CARE COORDINATION
MYAH referred patient to [facility] via [employer] system this morning. SW attempted to contact [facility] twice today and have had to leave VM messages. Since no response MYAH faxed referral to [phone]. MYAH will follow.     Electronically signed by MALINDA Archibald on 10/21/2022 at 4:36 PM

## 2022-10-21 NOTE — PROGRESS NOTES
[facility]   Nephrology progress Note  [phone]   [facility]. [employer]           SUMMARY :  We are following this patient for hyponatremia  80year-old female with past medical history of chronic hyponatremia, hypertension, CHF, presented to the hospital after a fall. At that time her serum sodium was 126. She sees my partner Dr. Jarad Jackson  for chronic hyponatremia. SUBJECTIVE:   Patient progress reviewed. The patient has no new complaints, no family by bedside    Physical Exam:    VITALS:  /70   Pulse 76   Temp 97.6 °F (36.4 °C) (Oral)   Resp 16   Ht 5' 5\" (1.651 m)   Wt 163 lb (73.9 kg)   SpO2 94%   BMI 27.12 kg/m²   BLOOD PRESSURE RANGE:  Systolic (81XLQ), KEX:080 , Min:127 , QOC:065   ; Diastolic (68IKK), RIM:42, Min:69, Max:70    24HR INTAKE/OUTPUT:    Intake/Output Summary (Last 24 hours) at 10/21/2022 1051  Last data filed at 10/21/2022 7020  Gross per 24 hour   Intake 480 ml   Output 3050 ml   Net -2570 ml         Gen:  alert, oriented x 2, hard of hearing  PERRL , EOM +  Pallor +, No icterus  JVP not raised   CV: RRR no murmur or rub . Lungs:B/ L air entry, Normal breath sounds   Abd: soft, bowel sounds + , No organomegaly   Ext: No edema, no cyanosis  Skin: Warm.   No rash  Neuro: nonfocal.      DATA:    CBC with Differential:    Lab Results   Component Value Date/Time    WBC 6.0 10/21/2022 06:22 AM    RBC 3.01 10/21/2022 06:22 AM    HGB 8.7 10/21/2022 06:22 AM    HCT 26.2 10/21/2022 06:22 AM     10/21/2022 06:22 AM    MCV 87.1 10/21/2022 06:22 AM    MCH 29.0 10/21/2022 06:22 AM    MCHC 33.2 10/21/2022 06:22 AM    RDW 14.7 10/21/2022 06:22 AM    LYMPHOPCT 15.3 10/10/2022 02:00 PM    MONOPCT 4.2 10/10/2022 02:00 PM    BASOPCT 0.6 10/10/2022 02:00 PM    MONOSABS 0.4 10/10/2022 02:00 PM    LYMPHSABS 1.5 10/10/2022 02:00 PM    EOSABS 0.0 10/10/2022 02:00 PM    BASOSABS 0.1 10/10/2022 02:00 PM     CMP:    Lab Results   Component Value Date/Time     10/21/2022 06:22 AM    K 4.3 10/21/2022 06:22 AM    K 4.1 10/10/2022 02:00 PM    CL 95 10/21/2022 06:22 AM    CO2 22 10/21/2022 06:22 AM    BUN 20 10/21/2022 06:22 AM    CREATININE <0.5 10/21/2022 06:22 AM    GFRAA >60 10/17/2022 05:22 AM    AGRATIO 1.2 10/10/2022 02:00 PM    LABGLOM >60 10/21/2022 06:22 AM    GLUCOSE 93 10/21/2022 06:22 AM    PROT 6.9 10/10/2022 02:00 PM    LABALBU 3.8 10/10/2022 02:00 PM    CALCIUM 9.3 10/21/2022 06:22 AM    BILITOT 0.4 10/10/2022 02:00 PM    ALKPHOS 69 10/10/2022 02:00 PM    AST 16 10/10/2022 02:00 PM    ALT 11 10/10/2022 02:00 PM     Magnesium:    Lab Results   Component Value Date/Time    MG 2.00 10/15/2022 05:59 AM     Phosphorus:  No results found for: PHOS  Uric Acid:  No results found for: LABURIC, URICACID  U/A:    Lab Results   Component Value Date/Time    COLORU Yellow 10/10/2022 07:00 PM    PROTEINU Negative 10/10/2022 07:00 PM    PHUR 6.0 10/10/2022 07:00 PM    WBCUA 2 10/10/2022 07:00 PM    RBCUA 1 10/10/2022 07:00 PM    BACTERIA None Seen 10/10/2022 07:00 PM    CLARITYU Clear 10/10/2022 07:00 PM    SPECGRAV 1.025 10/10/2022 07:00 PM    LEUKOCYTESUR SMALL 10/10/2022 07:00 PM    UROBILINOGEN 1.0 10/10/2022 07:00 PM    BILIRUBINUR Negative 10/10/2022 07:00 PM    BLOODU Negative 10/10/2022 07:00 PM    GLUCOSEU Negative 10/10/2022 07:00 PM         IMPRESSION/RECOMMENDATIONS:      Diagnosis and Plan     Hyponatremia:  SIADH  U sodium 72,  urine osmolality 659   plasma osmolality 263  AM cortisol 10.3, TSH 1.45   Started urea 10/18/22, salt tablets stopped 10/19/22  Na dropped to 129  ( was 128,  132)   fluid restriction 1800 ml/24 hrs plus urea     Hypertension:  normal,     Left shoulder fracture s/p reverse left shoulder arthroplasty with tuberosity fixation on 10/12/2022:    Fred Emmanuel MD

## 2022-10-21 NOTE — PROGRESS NOTES
Nancy Ely  10/21/2022  [id number]    Chief Complaint: Left supracondylar humerus fracture, sequela    Subjective:   No overnight events. Pain continues to limit therapy. No complaints outside of pain    ROS: No CP, SOB, dyspnea    Objective:  Patient Vitals for the past 24 hrs:   BP Temp Temp src Pulse Resp SpO2   10/20/22 2156 -- -- -- -- 16 --   10/20/22 2115 127/69 97.5 °F (36.4 °C) Oral 84 16 96 %   10/20/22 1602 -- -- -- -- 16 --   10/20/22 1000 (!) 103/59 97.5 °F (36.4 °C) Oral 75 17 95 %   10/20/22 0751 -- -- -- -- 18 --       Gen: No distress, pleasant. Resting in bed  HEENT: Normocephalic, atraumatic. CV: Regular rate and rhythm. No MRG   Resp: No respiratory distress. CTAB   Abd: Soft, nontender nondistended  Ext: No edema. LUE incision well approximated with staples, no erythema or drainage  Neuro: Alert, oriented, appropriately interactive. Laboratory data: Available via EMR. Therapy progress:    PT    Supine to Sit: Dependent  Sit to Supine: Dependent   Sit to Stand: Dependent  Chair/Bed to Chair Transfer: Dependent  Car Transfer:    Ambulation 10 ft:    Ambulation 50 ft:    Ambulation 150 ft:    Stairs - 1 Step:    Stairs - 4 Step:    Stairs - 12 Step:      OT    Eating: Supervision or touching assistance  Oral Hygiene: Supervision or touching assistance  Bathing: Substantial/maximal assistance  Upper Body Dressing: Dependent  Lower Body Dressing: Dependent  Toilet Transfer: Dependent  Toilet Hygiene: Dependent    Speech Therapy         Body mass index is 27.12 kg/m². Assessment:  Patient Active Problem List   Diagnosis    Closed displaced fracture of surgical neck of humerus, initial encounter    Osteoporosis with current pathological fracture, initial encounter    Hyponatremia    Left supracondylar humerus fracture, sequela       Plan:   Displaced left proximal humeral fracture: Left reverse shoulder arthroplasty on 10/12 with Dr Tariq Bills. NWB LUJAE. Continue sling.       Acute on chronic hyponatremia-Nephrology on board, held salt tab. Fluid restriction. Urea     Hypertension: nifedipine XL     Bowel: Per protocol  Bladder: Per protocol  Sleep: Trazodone PRN  Pain: tylenol, scheduled, tramadol PRN  DVT PPx: lovenox  JAIRO: 10/? - [facility]    Renny Long MD 10/21/2022, 7:48 AM    * This document was created using dictation software. While all precautions were taken to ensure accuracy, errors may have occurred. Please disregard any typographical errors.

## 2022-10-22 PROCEDURE — 6370000000 HC RX 637 (ALT 250 FOR IP): Performed by: PHYSICAL MEDICINE & REHABILITATION

## 2022-10-22 PROCEDURE — 1280000000 HC REHAB R&B

## 2022-10-22 PROCEDURE — 6370000000 HC RX 637 (ALT 250 FOR IP): Performed by: INTERNAL MEDICINE

## 2022-10-22 PROCEDURE — 94760 N-INVAS EAR/PLS OXIMETRY 1: CPT

## 2022-10-22 PROCEDURE — 6360000002 HC RX W HCPCS: Performed by: PHYSICAL MEDICINE & REHABILITATION

## 2022-10-22 RX ADMIN — NIFEDIPINE 30 MG: 30 TABLET, EXTENDED RELEASE ORAL at 08:25

## 2022-10-22 RX ADMIN — TRAMADOL HYDROCHLORIDE 50 MG: 50 TABLET ORAL at 05:53

## 2022-10-22 RX ADMIN — Medication 30 G: at 08:26

## 2022-10-22 RX ADMIN — TRAMADOL HYDROCHLORIDE 100 MG: 50 TABLET, FILM COATED ORAL at 14:54

## 2022-10-22 RX ADMIN — ACETAMINOPHEN 1000 MG: 500 TABLET ORAL at 08:25

## 2022-10-22 RX ADMIN — ENOXAPARIN SODIUM 40 MG: 100 INJECTION SUBCUTANEOUS at 08:25

## 2022-10-22 RX ADMIN — DIAZEPAM 1 MG: 2 TABLET ORAL at 08:25

## 2022-10-22 RX ADMIN — TRAMADOL HYDROCHLORIDE 100 MG: 50 TABLET, FILM COATED ORAL at 18:51

## 2022-10-22 RX ADMIN — CETIRIZINE HYDROCHLORIDE 5 MG: 10 TABLET, FILM COATED ORAL at 08:25

## 2022-10-22 RX ADMIN — TRAZODONE HYDROCHLORIDE 50 MG: 50 TABLET ORAL at 20:22

## 2022-10-22 RX ADMIN — ONDANSETRON 4 MG: 4 TABLET, ORALLY DISINTEGRATING ORAL at 10:38

## 2022-10-22 RX ADMIN — TRAMADOL HYDROCHLORIDE 100 MG: 50 TABLET, FILM COATED ORAL at 10:38

## 2022-10-22 ASSESSMENT — PAIN SCALES - GENERAL
PAINLEVEL_OUTOF10: 4
PAINLEVEL_OUTOF10: 2
PAINLEVEL_OUTOF10: 6
PAINLEVEL_OUTOF10: 5
PAINLEVEL_OUTOF10: 6

## 2022-10-22 ASSESSMENT — PAIN DESCRIPTION - LOCATION
LOCATION: SHOULDER

## 2022-10-22 ASSESSMENT — PAIN DESCRIPTION - ONSET: ONSET: ON-GOING

## 2022-10-22 ASSESSMENT — PAIN - FUNCTIONAL ASSESSMENT: PAIN_FUNCTIONAL_ASSESSMENT: PREVENTS OR INTERFERES SOME ACTIVE ACTIVITIES AND ADLS

## 2022-10-22 ASSESSMENT — PAIN DESCRIPTION - ORIENTATION
ORIENTATION: LEFT

## 2022-10-22 ASSESSMENT — PAIN DESCRIPTION - DESCRIPTORS
DESCRIPTORS: ACHING

## 2022-10-22 ASSESSMENT — PAIN DESCRIPTION - PAIN TYPE: TYPE: SURGICAL PAIN

## 2022-10-22 ASSESSMENT — PAIN DESCRIPTION - FREQUENCY: FREQUENCY: CONTINUOUS

## 2022-10-22 NOTE — PROGRESS NOTES
Dilan Kiser  10/22/2022  [id number]    Chief Complaint: Left supracondylar humerus fracture, sequela    Subjective:   No overnight events. Still struggling w/ pain control. ROS: No CP, SOB, dyspnea    Objective:  Patient Vitals for the past 24 hrs:   BP Temp Temp src Pulse Resp SpO2   10/21/22 2200 113/61 97.6 °F (36.4 °C) Oral 72 16 91 %   10/21/22 1902 -- -- -- -- 16 --   10/21/22 1832 -- -- -- -- 16 --   10/21/22 1445 -- -- -- -- 16 --   10/21/22 1345 -- -- -- -- 16 --       Gen: No distress, pleasant. Resting in bed  HEENT: Normocephalic, atraumatic. CV: Regular rate and rhythm. No MRG   Resp: No respiratory distress. CTAB   Abd: Soft, nontender nondistended  Ext: No edema. LUE incision well approximated with staples, no erythema or drainage  Neuro: Alert, oriented, appropriately interactive. Laboratory data: Available via EMR. Therapy progress:    PT    Supine to Sit: Dependent  Sit to Supine: Dependent   Sit to Stand: Dependent  Chair/Bed to Chair Transfer: Dependent  Car Transfer:    Ambulation 10 ft:    Ambulation 50 ft:    Ambulation 150 ft:    Stairs - 1 Step:    Stairs - 4 Step:    Stairs - 12 Step:      OT    Eating: Supervision or touching assistance  Oral Hygiene: Supervision or touching assistance  Bathing: Substantial/maximal assistance  Upper Body Dressing: Dependent  Lower Body Dressing: Dependent  Toilet Transfer: Dependent  Toilet Hygiene: Dependent    Speech Therapy         Body mass index is 27.12 kg/m². Assessment:  Patient Active Problem List   Diagnosis    Closed displaced fracture of surgical neck of humerus, initial encounter    Osteoporosis with current pathological fracture, initial encounter    Hyponatremia    Left supracondylar humerus fracture, sequela       Plan:   Displaced left proximal humeral fracture: Left reverse shoulder arthroplasty on 10/12 with Dr Baljit Mays. NWB GOMEZ. Continue sling. Acute on chronic hyponatremia-Nephrology on board, held salt tab. Fluid restriction. Urea     Hypertension: nifedipine XL     Bowel: Per protocol  Bladder: Per protocol  Sleep: Trazodone PRN  Pain: tylenol, scheduled, tramadol PRN  DVT PPx: lovenox  JAIRO: 10/? - [facility]    Berry Hatch MD  10/22/2022, 10:34 AM    * This document was created using dictation software. While all precautions were taken to ensure accuracy, errors may have occurred. Please disregard any typographical errors.

## 2022-10-22 NOTE — PROGRESS NOTES
[facility]   Nephrology progress Note  [phone]   [facility]. [employer]           SUMMARY :  We are following this patient for hyponatremia  80year-old female with past medical history of chronic hyponatremia, hypertension, CHF, presented to the hospital after a fall. At that time her serum sodium was 126. She sees my partner Dr. Severiano Mar  for chronic hyponatremia. SUBJECTIVE:   Patient progress reviewed. The patient has no new complaints, no family by bedside    Physical Exam:    VITALS:  /70   Pulse 76   Temp 97.6 °F (36.4 °C) (Oral)   Resp 16   Ht 5' 5\" (1.651 m)   Wt 163 lb (73.9 kg)   SpO2 91%   BMI 27.12 kg/m²   BLOOD PRESSURE RANGE:  Systolic (87VWH), VPC:263 , Min:113 , JTC:356   ; Diastolic (31EKQ), LCC:22, Min:61, Max:70    24HR INTAKE/OUTPUT:    Intake/Output Summary (Last 24 hours) at 10/22/2022 1302  Last data filed at 10/22/2022 0604  Gross per 24 hour   Intake 300 ml   Output 800 ml   Net -500 ml         Gen:  alert, oriented x 2, hard of hearing. In bed. PERRL , EOM +  Pallor +, No icterus  JVP not raised   CV: RRR no murmur or rub . Lungs:B/ L air entry, Normal breath sounds   Abd: soft, bowel sounds + , No organomegaly   Ext: No edema, no cyanosis  Skin: Warm.   No rash  Neuro: nonfocal.      DATA:    CBC with Differential:    Lab Results   Component Value Date/Time    WBC 6.0 10/21/2022 06:22 AM    RBC 3.01 10/21/2022 06:22 AM    HGB 8.7 10/21/2022 06:22 AM    HCT 26.2 10/21/2022 06:22 AM     10/21/2022 06:22 AM    MCV 87.1 10/21/2022 06:22 AM    MCH 29.0 10/21/2022 06:22 AM    MCHC 33.2 10/21/2022 06:22 AM    RDW 14.7 10/21/2022 06:22 AM    LYMPHOPCT 15.3 10/10/2022 02:00 PM    MONOPCT 4.2 10/10/2022 02:00 PM    BASOPCT 0.6 10/10/2022 02:00 PM    MONOSABS 0.4 10/10/2022 02:00 PM    LYMPHSABS 1.5 10/10/2022 02:00 PM    EOSABS 0.0 10/10/2022 02:00 PM    BASOSABS 0.1 10/10/2022 02:00 PM     CMP:    Lab Results   Component Value Date/Time  10/21/2022 06:22 AM    K 4.3 10/21/2022 06:22 AM    K 4.1 10/10/2022 02:00 PM    CL 95 10/21/2022 06:22 AM    CO2 22 10/21/2022 06:22 AM    BUN 20 10/21/2022 06:22 AM    CREATININE <0.5 10/21/2022 06:22 AM    GFRAA >60 10/17/2022 05:22 AM    AGRATIO 1.2 10/10/2022 02:00 PM    LABGLOM >60 10/21/2022 06:22 AM    GLUCOSE 93 10/21/2022 06:22 AM    PROT 6.9 10/10/2022 02:00 PM    LABALBU 3.8 10/10/2022 02:00 PM    CALCIUM 9.3 10/21/2022 06:22 AM    BILITOT 0.4 10/10/2022 02:00 PM    ALKPHOS 69 10/10/2022 02:00 PM    AST 16 10/10/2022 02:00 PM    ALT 11 10/10/2022 02:00 PM     Magnesium:    Lab Results   Component Value Date/Time    MG 2.00 10/15/2022 05:59 AM     Phosphorus:  No results found for: PHOS  Uric Acid:  No results found for: LABURIC, URICACID  U/A:    Lab Results   Component Value Date/Time    COLORU Yellow 10/10/2022 07:00 PM    PROTEINU Negative 10/10/2022 07:00 PM    PHUR 6.0 10/10/2022 07:00 PM    WBCUA 2 10/10/2022 07:00 PM    RBCUA 1 10/10/2022 07:00 PM    BACTERIA None Seen 10/10/2022 07:00 PM    CLARITYU Clear 10/10/2022 07:00 PM    SPECGRAV 1.025 10/10/2022 07:00 PM    LEUKOCYTESUR SMALL 10/10/2022 07:00 PM    UROBILINOGEN 1.0 10/10/2022 07:00 PM    BILIRUBINUR Negative 10/10/2022 07:00 PM    BLOODU Negative 10/10/2022 07:00 PM    GLUCOSEU Negative 10/10/2022 07:00 PM         IMPRESSION/RECOMMENDATIONS:      Diagnosis and Plan     Hyponatremia:  SIADH  U sodium 72,  urine osmolality 659   plasma osmolality 263  AM cortisol 10.3, TSH 1.45   Started urea 10/18/22, salt tablets stopped 10/19/22  On fluid restriction 1800 ml/24 hrs plus urea  Sodium stable in upper 120's. Update Sunday. Hypertension: BP controlled. Left shoulder fracture s/p reverse left shoulder arthroplasty with tuberosity fixation on 10/12/2022: Rehab.     Pearle Councilman, MD

## 2022-10-22 NOTE — PLAN OF CARE
Problem: Discharge Planning  Goal: Discharge to home or other facility with appropriate resources  Outcome: Progressing     Problem: Skin/Tissue Integrity  Goal: Absence of new skin breakdown  Description: 1. Monitor for areas of redness and/or skin breakdown  2. Assess vascular access sites hourly  3. Every 4-6 hours minimum:  Change oxygen saturation probe site  4. Every 4-6 hours:  If on nasal continuous positive airway pressure, respiratory therapy assess nares and determine need for appliance change or resting period.   Outcome: Progressing     Problem: Safety - Adult  Goal: Free from fall injury  10/22/2022 1240 by Lorna Espinal RN  Outcome: Progressing     Problem: ABCDS Injury Assessment  Goal: Absence of physical injury  Outcome: Progressing     Problem: Pain  Goal: Verbalizes/displays adequate comfort level or baseline comfort level  10/22/2022 1240 by Lorna Espinal RN  Outcome: Progressing     Problem: Nutrition Deficit:  Goal: Optimize nutritional status  Outcome: Progressing     Problem: Chronic Conditions and Co-morbidities  Goal: Patient's chronic conditions and co-morbidity symptoms are monitored and maintained or improved  Outcome: Progressing

## 2022-10-23 LAB
ANION GAP SERPL CALCULATED.3IONS-SCNC: 8 MMOL/L (ref 3–16)
BUN BLDV-MCNC: 23 MG/DL (ref 7–20)
CALCIUM SERPL-MCNC: 9.2 MG/DL (ref 8.3–10.6)
CHLORIDE BLD-SCNC: 96 MMOL/L (ref 99–110)
CO2: 25 MMOL/L (ref 21–32)
CREAT SERPL-MCNC: <0.5 MG/DL (ref 0.6–1.2)
GFR SERPL CREATININE-BSD FRML MDRD: >60 ML/MIN/{1.73_M2}
GLUCOSE BLD-MCNC: 120 MG/DL (ref 70–99)
POTASSIUM SERPL-SCNC: 4 MMOL/L (ref 3.5–5.1)
SODIUM BLD-SCNC: 129 MMOL/L (ref 136–145)

## 2022-10-23 PROCEDURE — 36415 COLL VENOUS BLD VENIPUNCTURE: CPT

## 2022-10-23 PROCEDURE — 80048 BASIC METABOLIC PNL TOTAL CA: CPT

## 2022-10-23 PROCEDURE — 6370000000 HC RX 637 (ALT 250 FOR IP): Performed by: PHYSICAL MEDICINE & REHABILITATION

## 2022-10-23 PROCEDURE — 6370000000 HC RX 637 (ALT 250 FOR IP): Performed by: INTERNAL MEDICINE

## 2022-10-23 PROCEDURE — 1280000000 HC REHAB R&B

## 2022-10-23 PROCEDURE — 6360000002 HC RX W HCPCS: Performed by: PHYSICAL MEDICINE & REHABILITATION

## 2022-10-23 RX ADMIN — NIFEDIPINE 30 MG: 30 TABLET, EXTENDED RELEASE ORAL at 08:34

## 2022-10-23 RX ADMIN — ENOXAPARIN SODIUM 40 MG: 100 INJECTION SUBCUTANEOUS at 08:37

## 2022-10-23 RX ADMIN — ACETAMINOPHEN 1000 MG: 500 TABLET ORAL at 08:33

## 2022-10-23 RX ADMIN — ONDANSETRON 4 MG: 4 TABLET, ORALLY DISINTEGRATING ORAL at 13:45

## 2022-10-23 RX ADMIN — TRAMADOL HYDROCHLORIDE 100 MG: 50 TABLET, FILM COATED ORAL at 08:34

## 2022-10-23 RX ADMIN — TRAMADOL HYDROCHLORIDE 100 MG: 50 TABLET, FILM COATED ORAL at 16:47

## 2022-10-23 RX ADMIN — TRAMADOL HYDROCHLORIDE 100 MG: 50 TABLET, FILM COATED ORAL at 12:50

## 2022-10-23 RX ADMIN — Medication 30 G: at 08:21

## 2022-10-23 RX ADMIN — CETIRIZINE HYDROCHLORIDE 5 MG: 10 TABLET, FILM COATED ORAL at 08:34

## 2022-10-23 ASSESSMENT — PAIN SCALES - GENERAL
PAINLEVEL_OUTOF10: 4
PAINLEVEL_OUTOF10: 4
PAINLEVEL_OUTOF10: 0
PAINLEVEL_OUTOF10: 5

## 2022-10-23 ASSESSMENT — PAIN DESCRIPTION - DESCRIPTORS
DESCRIPTORS: ACHING

## 2022-10-23 ASSESSMENT — PAIN DESCRIPTION - LOCATION
LOCATION: SHOULDER

## 2022-10-23 ASSESSMENT — PAIN DESCRIPTION - ORIENTATION
ORIENTATION: LEFT

## 2022-10-23 NOTE — PLAN OF CARE
Problem: Safety - Adult  Goal: Free from fall injury  10/22/2022 2248 by RICHARDSON Cazares  Outcome: Progressing     Problem: Pain  Goal: Verbalizes/displays adequate comfort level or baseline comfort level  10/22/2022 2248 by Debora RN  Outcome: Progressing

## 2022-10-23 NOTE — PROGRESS NOTES
[facility]   Nephrology progress Note  [phone]   [facility]. [employer]           SUMMARY :  We are following this patient for hyponatremia  80year-old female with past medical history of chronic hyponatremia, hypertension, CHF, presented to the hospital after a fall. At that time her serum sodium was 126. She sees my partner Dr. Jennifer Aleman  for chronic hyponatremia. SUBJECTIVE:   Patient progress reviewed. The patient has no new complaints, no family by bedside    Physical Exam:    VITALS:  /62   Pulse 82   Temp 97.6 °F (36.4 °C) (Oral)   Resp 16   Ht 5' 5\" (1.651 m)   Wt 163 lb (73.9 kg)   SpO2 92%   BMI 27.12 kg/m²   BLOOD PRESSURE RANGE:  Systolic (50DQE), skilled nursing:056 , Min:107 , ORJ:487   ; Diastolic (25PJH), WH, Min:62, Max:62    24HR INTAKE/OUTPUT:  No intake or output data in the 24 hours ending 10/23/22 1404      Gen:  NAD, hard of hearing. In chair. PERRL , EOM +  Pallor +, No icterus  JVP not raised   CV: RRR no murmur or rub . Lungs:B/ L air entry, Normal breath sounds   Abd: soft, bowel sounds + , No organomegaly   Ext: No edema, no cyanosis. Left arm in sling. Skin: Warm.   No rash  Neuro: nonfocal.      DATA:    CBC with Differential:    Lab Results   Component Value Date/Time    WBC 6.0 10/21/2022 06:22 AM    RBC 3.01 10/21/2022 06:22 AM    HGB 8.7 10/21/2022 06:22 AM    HCT 26.2 10/21/2022 06:22 AM     10/21/2022 06:22 AM    MCV 87.1 10/21/2022 06:22 AM    MCH 29.0 10/21/2022 06:22 AM    MCHC 33.2 10/21/2022 06:22 AM    RDW 14.7 10/21/2022 06:22 AM    LYMPHOPCT 15.3 10/10/2022 02:00 PM    MONOPCT 4.2 10/10/2022 02:00 PM    BASOPCT 0.6 10/10/2022 02:00 PM    MONOSABS 0.4 10/10/2022 02:00 PM    LYMPHSABS 1.5 10/10/2022 02:00 PM    EOSABS 0.0 10/10/2022 02:00 PM    BASOSABS 0.1 10/10/2022 02:00 PM     CMP:    Lab Results   Component Value Date/Time     10/23/2022 12:08 PM    K 4.0 10/23/2022 12:08 PM    K 4.1 10/10/2022 02:00 PM    CL

## 2022-10-24 LAB
ANION GAP SERPL CALCULATED.3IONS-SCNC: 8 MMOL/L (ref 3–16)
BUN BLDV-MCNC: 24 MG/DL (ref 7–20)
CALCIUM SERPL-MCNC: 9.1 MG/DL (ref 8.3–10.6)
CHLORIDE BLD-SCNC: 92 MMOL/L (ref 99–110)
CO2: 26 MMOL/L (ref 21–32)
CREAT SERPL-MCNC: <0.5 MG/DL (ref 0.6–1.2)
GFR SERPL CREATININE-BSD FRML MDRD: >60 ML/MIN/{1.73_M2}
GLUCOSE BLD-MCNC: 95 MG/DL (ref 70–99)
HCT VFR BLD CALC: 26 % (ref 36–48)
HEMOGLOBIN: 8.8 G/DL (ref 12–16)
MAGNESIUM: 1.9 MG/DL (ref 1.8–2.4)
MCH RBC QN AUTO: 29.3 PG (ref 26–34)
MCHC RBC AUTO-ENTMCNC: 33.9 G/DL (ref 31–36)
MCV RBC AUTO: 86.6 FL (ref 80–100)
OSMOLALITY URINE: 659 MOSM/KG (ref 390–1070)
PDW BLD-RTO: 14.9 % (ref 12.4–15.4)
PHOSPHORUS: 3 MG/DL (ref 2.5–4.9)
PLATELET # BLD: 184 K/UL (ref 135–450)
PMV BLD AUTO: 6.8 FL (ref 5–10.5)
POTASSIUM SERPL-SCNC: 4.2 MMOL/L (ref 3.5–5.1)
RBC # BLD: 3 M/UL (ref 4–5.2)
SODIUM BLD-SCNC: 126 MMOL/L (ref 136–145)
SODIUM BLD-SCNC: 126 MMOL/L (ref 136–145)
SODIUM URINE: 53 MMOL/L
WBC # BLD: 6.2 K/UL (ref 4–11)

## 2022-10-24 PROCEDURE — 97530 THERAPEUTIC ACTIVITIES: CPT

## 2022-10-24 PROCEDURE — 80048 BASIC METABOLIC PNL TOTAL CA: CPT

## 2022-10-24 PROCEDURE — 6370000000 HC RX 637 (ALT 250 FOR IP): Performed by: INTERNAL MEDICINE

## 2022-10-24 PROCEDURE — 97535 SELF CARE MNGMENT TRAINING: CPT

## 2022-10-24 PROCEDURE — 6360000002 HC RX W HCPCS: Performed by: PHYSICAL MEDICINE & REHABILITATION

## 2022-10-24 PROCEDURE — 6370000000 HC RX 637 (ALT 250 FOR IP): Performed by: PHYSICAL MEDICINE & REHABILITATION

## 2022-10-24 PROCEDURE — 83735 ASSAY OF MAGNESIUM: CPT

## 2022-10-24 PROCEDURE — 83935 ASSAY OF URINE OSMOLALITY: CPT

## 2022-10-24 PROCEDURE — 84300 ASSAY OF URINE SODIUM: CPT

## 2022-10-24 PROCEDURE — 84295 ASSAY OF SERUM SODIUM: CPT

## 2022-10-24 PROCEDURE — 36415 COLL VENOUS BLD VENIPUNCTURE: CPT

## 2022-10-24 PROCEDURE — 1280000000 HC REHAB R&B

## 2022-10-24 PROCEDURE — 84100 ASSAY OF PHOSPHORUS: CPT

## 2022-10-24 PROCEDURE — 85027 COMPLETE CBC AUTOMATED: CPT

## 2022-10-24 PROCEDURE — 97110 THERAPEUTIC EXERCISES: CPT

## 2022-10-24 RX ORDER — FUROSEMIDE 20 MG/1
10 TABLET ORAL DAILY
Status: DISCONTINUED | OUTPATIENT
Start: 2022-10-24 | End: 2022-10-25 | Stop reason: HOSPADM

## 2022-10-24 RX ORDER — SODIUM CHLORIDE 1000 MG
1 TABLET, SOLUBLE MISCELLANEOUS
Status: DISCONTINUED | OUTPATIENT
Start: 2022-10-24 | End: 2022-10-25 | Stop reason: HOSPADM

## 2022-10-24 RX ORDER — SOD.CHLORID/POTASSIUM CHLORIDE 287-180-15
1 TABLET ORAL 2 TIMES DAILY
Status: DISCONTINUED | OUTPATIENT
Start: 2022-10-24 | End: 2022-10-24

## 2022-10-24 RX ADMIN — TRAMADOL HYDROCHLORIDE 100 MG: 50 TABLET, FILM COATED ORAL at 07:07

## 2022-10-24 RX ADMIN — TRAZODONE HYDROCHLORIDE 50 MG: 50 TABLET ORAL at 21:16

## 2022-10-24 RX ADMIN — FUROSEMIDE 10 MG: 20 TABLET ORAL at 16:46

## 2022-10-24 RX ADMIN — ACETAMINOPHEN 1000 MG: 500 TABLET ORAL at 14:53

## 2022-10-24 RX ADMIN — ACETAMINOPHEN 1000 MG: 500 TABLET ORAL at 07:53

## 2022-10-24 RX ADMIN — Medication 30 G: at 07:54

## 2022-10-24 RX ADMIN — ONDANSETRON 4 MG: 4 TABLET, ORALLY DISINTEGRATING ORAL at 07:13

## 2022-10-24 RX ADMIN — SODIUM CHLORIDE TAB 1 GM 1 G: 1 TAB at 16:46

## 2022-10-24 RX ADMIN — POLYETHYLENE GLYCOL 3350 17 G: 17 POWDER, FOR SOLUTION ORAL at 07:53

## 2022-10-24 RX ADMIN — TRAMADOL HYDROCHLORIDE 50 MG: 50 TABLET ORAL at 17:34

## 2022-10-24 RX ADMIN — ENOXAPARIN SODIUM 40 MG: 100 INJECTION SUBCUTANEOUS at 07:54

## 2022-10-24 RX ADMIN — CETIRIZINE HYDROCHLORIDE 5 MG: 10 TABLET, FILM COATED ORAL at 07:53

## 2022-10-24 RX ADMIN — NIFEDIPINE 30 MG: 30 TABLET, EXTENDED RELEASE ORAL at 07:53

## 2022-10-24 ASSESSMENT — PAIN DESCRIPTION - ORIENTATION
ORIENTATION: RIGHT;LEFT
ORIENTATION: LEFT

## 2022-10-24 ASSESSMENT — PAIN DESCRIPTION - ONSET: ONSET: ON-GOING

## 2022-10-24 ASSESSMENT — PAIN DESCRIPTION - PAIN TYPE: TYPE: SURGICAL PAIN

## 2022-10-24 ASSESSMENT — PAIN DESCRIPTION - LOCATION
LOCATION: SHOULDER
LOCATION: SHOULDER
LOCATION: ARM;HAND;KNEE
LOCATION: SHOULDER

## 2022-10-24 ASSESSMENT — PAIN - FUNCTIONAL ASSESSMENT
PAIN_FUNCTIONAL_ASSESSMENT: PREVENTS OR INTERFERES SOME ACTIVE ACTIVITIES AND ADLS

## 2022-10-24 ASSESSMENT — PAIN SCALES - WONG BAKER
WONGBAKER_NUMERICALRESPONSE: 0
WONGBAKER_NUMERICALRESPONSE: 0

## 2022-10-24 ASSESSMENT — PAIN DESCRIPTION - DESCRIPTORS
DESCRIPTORS: SHARP;ACHING
DESCRIPTORS: ACHING

## 2022-10-24 ASSESSMENT — PAIN SCALES - GENERAL
PAINLEVEL_OUTOF10: 6
PAINLEVEL_OUTOF10: 4
PAINLEVEL_OUTOF10: 5
PAINLEVEL_OUTOF10: 0
PAINLEVEL_OUTOF10: 5
PAINLEVEL_OUTOF10: 3
PAINLEVEL_OUTOF10: 5

## 2022-10-24 ASSESSMENT — PAIN DESCRIPTION - FREQUENCY: FREQUENCY: CONTINUOUS

## 2022-10-24 NOTE — PLAN OF CARE
Problem: Discharge Planning  Goal: Discharge to home or other facility with appropriate resources  10/23/2022 2343 by Judi Real RN  Outcome: Progressing     Problem: Skin/Tissue Integrity  Goal: Absence of new skin breakdown  Description: 1. Monitor for areas of redness and/or skin breakdown  2. Assess vascular access sites hourly  3. Every 4-6 hours minimum:  Change oxygen saturation probe site  4. Every 4-6 hours:  If on nasal continuous positive airway pressure, respiratory therapy assess nares and determine need for appliance change or resting period.   10/23/2022 2343 by Judi Real RN  Outcome: Progressing     Problem: Safety - Adult  Goal: Free from fall injury  10/23/2022 2343 by Judi Real RN  Outcome: Progressing     Problem: ABCDS Injury Assessment  Goal: Absence of physical injury  10/23/2022 2343 by Judi Real RN  Outcome: Progressing     Problem: Pain  Goal: Verbalizes/displays adequate comfort level or baseline comfort level  10/23/2022 2343 by Judi Real RN  Outcome: Progressing

## 2022-10-24 NOTE — PROGRESS NOTES
[facility]   Phone: [phone]    Physical Therapy    [] Initial Evaluation            [x] Daily Treatment Note         [] Discharge Summary      Patient: Sara Shore   : 1934   MRN: [id number]   Date of Service:  10/24/2022  Admitting Diagnosis: Left supracondylar humerus fracture, sequela  Current Admission Summary: 80-year-old female with PMHx of CHF, hypertension, chronic hyponatremia and SIADH presented after sustaining a fall while going to the bathroom. Patient reported pain in her left shoulder and right hip. X-ray revealed a communicated fracture of left humeral neck with humeral shaft displaced. CT head negative for any acute intracranial pathology. Patient taken to surgery by Dr. Michelle Felton on 10/12 for a repair of her Left comminuted and displaced proximal humerus fracture with a Left reverse shoulder arthroplasty with tuberosity fixation. Post op, patient started in therapies, and patient is able to tolerate 3 hours of therapy 5 days per week and is medically appropriate for rehab on the Acute Rehabilitation Unit. Patient has regular Medicare insurance. She is agreeable to rehab unit treatment. Patient lives at home with her daughter in a two-level house, but she does have a stair lift system in place. Past Medical History:  has a past medical history of CHF (congestive heart failure) ([address] and Hypertension. Past Surgical History:  has a past surgical history that includes Cholecystectomy;  section; and shoulder surgery (Left, 10/12/2022).   Discharge Recommendations: SNF  DME Required For Discharge: wheelchair, mechanical lift  Precautions/Restrictions: high fall risk  Weight Bearing Restrictions: non weight bearing  [] Right Upper Extremity  [x] Left Upper Extremity [] Right Lower Extremity  [] Left Lower Extremity     Required Braces/Orthotics: LUE sling in abduction   [] Right  [x] Left  Positional Restrictions:no PROM or AROM L shoulder movement, okay for AROM elbow/forearm, wrist and hand    Lives With: daughter               Type of Home: house  Home Layout: two level, 1/2 bath on main level, bedroom/bathroom upstairs, chair lift to 2nd level  Home Access:  2 step to enter without rails , has portable ramp   Bathroom Layout: tub/shower unit, walk in shower, uses WIS  Bathroom Equipment: grab bars in shower, shower chair, toilet raiser  Toilet Height: standard height  Home Equipment: rolling walker, manual wheelchair, reacher, oxygen only at night; purewick at nights  Transfer Assistance: Independent without use of device  Ambulation Assistance:modified independent with use of FWW in house, w/c in community  ADL Assistance: independent with all ADL's  IADL Assistance: requires assistance with all homemaking tasks, requires assistance for medication management  Active :        [] Yes                 [x] No daughter provides transportation  Hand Dominance: [] Left                 [x] Right  Hobbies: enjoys reading the paper, watching TV  Recent Falls: 1 recent fall in last 6 months prior to this fall that lead to this admission           Subjective  General: Patient is in semi-fowlers in bed upon PT/OT arrival, daughter present, pt asking why her elbow hurts  Pain: 7/10.   Location: L shoulder  Pain Interventions: RN notified of patient request for pain medication       Functional Mobility  Bed Mobility  Supine to Sit: 2 person assistance with maxA of 1 and modA of 1   Sit to Supine: 2 person assistance with maxA of 2   Rolling Left: maximum assistance  Rolling Right: maximum assistance  Scootin person assistance with max A x 2   Bridging: minimal assistance  Comments:  Transfers  Sit to stand transfer: stedy utilized requiring maxA of 2 and Jeramie of 1   Stand to sit transfer: stedy utilized requiring maxA of 2   Stand pivot transfer: stedy utilized requiring maxA of 2    Slide board transfer: 2 person assistance with maxA of 2   Bed to chair transfer: stedy utilized requiring maxA of 2 and Jeramie of 1   Comments:  Ambulation  Ambulation not tested on this date secondary to patient requires maximal assistance to stand. Distance:   Gait Mechanics:   Comments:    Stair Mobility  Stair mobility not completed on this date. Comments:  Wheelchair Mobility:  No w/c mobility completed on this date. Comments:  Balance  Static Sitting Balance: fair (+): maintains balance at SBA/supervision without use of UE support  Dynamic Sitting Balance: fair (+): maintains balance at SBA/supervision without use of UE support  Static Standing Balance: poor (-): requires max (A) to maintain balance  Dynamic Standing Balance: poor (-): requires max (A) to maintain balance  Comments:        Other Therapeutic Interventions    1st session:  Assisted with sponge bath sitting EOB. Performed standing in stedy with maxA of 2 for LB dressing. Pt transferred to recliner with alarm on and all needs in reach. 2nd session:  Pt in recliner on arrival.  Performed slide board transfers as documented above. Attempted multiple stands in parallel bars with maxA of 2 and Jeramie of 1. Pt unable to achieve upright standing. Performed seated LAQ X 10, marching X 10, heel raises X 10, toe raises X 10, hip abduction X 10, hip adduction X 10, RUE shoulder flexion X 10, L grasp X 10, L elbow flexion/extension X 10, L wrist flexion/extension/pronation/supination X 10. Pt returned to room and performed slide board transfer back to bed. Brief changed with bridging method. Pt remained in bed with alarm on and all needs in reach.      Functional Outcomes                 Cognition  Overall Cognitive Status: Impaired  Arousal/Alterness: appropriate responses to stimuli  Following Commands: follows one step commands with repetition, follows one step commands with increased time  Safety Judgement: decreased awareness of need for assistance  Initiation: requires cues for some  Orientation: alert and oriented x 4  Command Following:   impaired--inconsistent with participation and command following    Education  Barriers To Learning: cognition and hearing  Patient Education: patient educated on goals, PT role and benefits, plan of care, weight-bearing education, general safety, functional mobility training, transfer training, discharge recommendations  Learning Assessment:  patient verbalizes understanding, would benefit from continued reinforcement    Assessment  Activity Tolerance: limited due to increased pain with any mobility  Impairments Requiring Therapeutic Intervention: decreased functional mobility, decreased strength, decreased safety awareness, decreased cognition, decreased endurance, decreased balance  Prognosis: guarded  Clinical Assessment: Pt remains limited by nausea and decreased awareness into deficits and benefits of therapy, despite encouragement and education. Pt continues to require significant assistance of 2 people or the use of lift equipment for all functional mobility. Pt will continue to benefit from skilled PT services to address deficits and promote safety in the home environment.    Safety Interventions: patient left in chair, chair alarm in place, call light within reach, and nurse notified    Plan  Frequency: 5 x/week, 90 min/day  Current Treatment Recommendations: strengthening, ROM, balance training, functional mobility training, transfer training, gait training, stair training, endurance training, patient/caregiver education, safety education, and equipment evaluation/education    Goals  Patient Goals: to go home and be independent    Short Term Goals:  Time Frame: 2 weeks   Patient will complete supine<>sit transfer at moderate assistance   Patient will complete lateral transfer at moderate assistance   Patient will complete manual w/c propulsion 10 ft at moderate assistance    To be met in 3 weeks:  Patient will complete supine<>sit transfer at min A I  Patient will complete stand step transfer at min A  Patient will complete manual w/c propulsion 100 ft at min A  Patient will tolerate ambulation x 10' with use of LRAD with mod A     Therapy Session Time      Individual Group Co-treatment   Time In      830   Time Out      915   Minutes      45       Second Session Therapy Time:   Individual Concurrent Group Co-treatment   Time In        1115   Time Out        1200   Minutes        45           Timed Code Treatment Minutes:  83+31      Total Treatment Minutes:  90      Electronically Signed By: Koby Hoyt PT

## 2022-10-24 NOTE — PROGRESS NOTES
Rollo Bumpers  10/24/2022  [id number]    Chief Complaint: Left supracondylar humerus fracture, sequela    Subjective:   No significant weekend events. No current complaints. Labs reviewed. CM working to obtain SNF placement. ROS: No CP, SOB, dyspnea    Objective:  Patient Vitals for the past 24 hrs:   BP Temp Temp src Pulse Resp SpO2   10/24/22 0745 (!) 142/69 97.5 °F (36.4 °C) Oral 71 16 93 %   10/24/22 0707 -- -- -- -- 18 --   10/23/22 2030 127/69 97.6 °F (36.4 °C) Oral 68 16 92 %       Gen: No distress, pleasant. Resting in bed  HEENT: Normocephalic, atraumatic. CV: Regular rate and rhythm. No MRG   Resp: No respiratory distress. CTAB   Abd: Soft, nontender nondistended  Ext: No edema. LUE incision well approximated with staples, no erythema or drainage  Neuro: Alert, oriented, appropriately interactive. Laboratory data: Available via EMR. Therapy progress:    PT    Supine to Sit: Dependent  Sit to Supine: Dependent   Sit to Stand: Dependent  Chair/Bed to Chair Transfer: Dependent  Car Transfer:    Ambulation 10 ft:    Ambulation 50 ft:    Ambulation 150 ft:    Stairs - 1 Step:    Stairs - 4 Step:    Stairs - 12 Step:      OT    Eating: Supervision or touching assistance  Oral Hygiene: Supervision or touching assistance  Bathing: Substantial/maximal assistance  Upper Body Dressing: Dependent  Lower Body Dressing: Dependent  Toilet Transfer: Dependent  Toilet Hygiene: Dependent    Speech Therapy         Body mass index is 27.12 kg/m². Assessment:  Patient Active Problem List   Diagnosis    Closed displaced fracture of surgical neck of humerus, initial encounter    Osteoporosis with current pathological fracture, initial encounter    Hyponatremia    Left supracondylar humerus fracture, sequela       Plan:   Displaced left proximal humeral fracture: Left reverse shoulder arthroplasty on 10/12 with Dr Smitha Parham. NWGAIL DYER. Continue sling.       Acute on chronic hyponatremia-Nephrology on board, held salt tab. Fluid restriction. Urea     Hypertension: nifedipine XL     Bowel: Per protocol  Bladder: Per protocol  Sleep: Trazodone PRN  Pain: tylenol, scheduled, tramadol PRN  DVT PPx: lovenox  JAIRO: 10/? - [facility]    Roscoe Carlisle MD 10/24/2022, 8:49 AM    * This document was created using dictation software. While all precautions were taken to ensure accuracy, errors may have occurred. Please disregard any typographical errors.

## 2022-10-24 NOTE — CARE COORDINATION
SW spoke with Admission Coordinator @ [address] [place] [address] regarding patient referral.  [facility] stating that facility cannot accept the patient due to patient having to many acute needs. Facility states that patient is more appropriate for LTC. SW informed patient's daughter. Daughter requesting that referrals be sent to other SNF. Daughter will provide SNF names to SW later today.     Electronically signed by MALINDA Archibald on 10/24/2022 at 11:41 AM

## 2022-10-24 NOTE — PROGRESS NOTES
Assessment completed. Patient in bed, alert and oriented x 4 and able to make all her needs known. C/o left shoulder pain. Sling in place to LUE. Dressing in place to left knee. Daughter at bedside. Medications administered as ordered. POC and education reviewed with patient and daughter and mutually agreed upon. All needs met at this time. Call light in reach. Will continue to monitor.

## 2022-10-24 NOTE — PROGRESS NOTES
[facility]   Phone: [phone]    Occupational Therapy    [] Initial Evaluation            [x] Daily Treatment Note         [] Discharge Summary      Patient: Cristina Villanueva   : 1934   MRN: [id number]   Date of Service:  10/24/2022    Admitting Diagnosis:  Left supracondylar humerus fracture, sequela  Current Admission Summary:  80-year-old female with PMHx of CHF, hypertension, chronic hyponatremia and SIADH presented after sustaining a fall while going to the bathroom. Patient reported pain in her left shoulder and right hip. X-ray revealed a communicated fracture of left humeral neck with humeral shaft displaced. CT head negative for any acute intracranial pathology. Patient taken to surgery by Dr. Yeyo [place] on 10/12 for a repair of her Left comminuted and displaced proximal humerus fracture with a Left reverse shoulder arthroplasty with tuberosity fixation. Post op, patient started in therapies, and patient is able to tolerate 3 hours of therapy 5 days per week and is medically appropriate for rehab on the Acute Rehabilitation Unit. Patient has regular Medicare insurance. She is agreeable to rehab unit treatment. Patient lives at home with her daughter in a two-level house, but she does have a stair lift system in place. Past Medical History:  has a past medical history of CHF (congestive heart failure) ([address] and Hypertension. Past Surgical History:  has a past surgical history that includes Cholecystectomy;  section; and shoulder surgery (Left, 10/12/2022).     Discharge Recommendations:24/ Assist and SUP, ECF for continued therapy     DME Required For Discharge: DME to be determined pending patient progress, ashutosh lift and BSC    Precautions/Restrictions: high fall risk, contact precautions, weight bearing, ROM restrictions  Weight Bearing Restrictions: non weight bearing  [] Right Upper Extremity  [x] Left Upper Extremity [] Right Lower Extremity  [] Left Lower Extremity     Required Braces/Orthotics:  abductor sling   [] Right  [x] Left  Positional Restrictions: no PROM or AROM L shoulder movement, okay for AROM elbow/forearm, wrist and hand  Comment: per Feroz Haddad note on 10/17/22:   - WB status:  NWB; continue sling - reviewed post op precautions. Exercises handout provided. Keep a pillow beneath the patient's elbow to keep the forearm in front of the body (do not allow the elbow to slide backwards onto the bed/chair). Loosen the sling 2x/day and have patient perform gentle elbow ROM, forearm rotation, and wrist ROM. NO shoulder ROM at all.     Prior Level of Function  Lives With: daughter  \"Samanta\" Arash Osorio has arthritis in her R hip which prevents hip flexion/extension, Kiersten Grijalva works from home)             Type of Home: house  Home Layout: two level, 1/2 bath on main level, bedroom/bathroom upstairs, chair lift to 2nd level  Home Access:  2 step to enter without rails , has portable ramp   Bathroom Layout: tub/shower unit, walk in shower, uses Gennett Fess is unsure if transport chair can go in/out of bathroom  Bathroom Equipment: grab bars in shower, shower chair, toilet raiser  Toilet Height: standard height  Home Equipment: rolling walker, transport wheelchair, reacher, oxygen only at night; purewick at [employer]: pt sleeps in a standard bed with arm rails  Transfer Assistance: Modified Independent with [facility] independent with use of FWW in house, w/c in [address] [place] [address]: requires assistance with toileting, dressing, and bathing, dtr provides setup assistance for grooming tasks and feeing  IADL Assistance: requires assistance with all homemaking tasks  Active :        [] Yes                 [x] No daughter provides transportation  Hand Dominance: [] Left                 [x] Right  Hobbies: enjoys reading the paper, watching TV  Recent Falls: 1 recent fall in last 6 months prior to this fall that lead to this admission  Comment: per chart review-  pt came to hospital ED on 9/1/22 5/6/22, and 11/3/21 for falls, pt with recent rehab stay at [facility] in September 2022 (pt there for a few days)      Subjective  General: Pt supine in bed upon arival, agreeable to OT/PT co tx. Pt's dtr present throughout session. Pain: Patient does not rate upon questioning - reports pain with movement/repositioning, no complaint of pain at rest   Pain Interventions: pain medication in place prior to arrival and repositioned       Activities of Daily Living  Basic Activities of Daily Living  Upper Extremity Bathing: maximum assistance  Lower Extremity Bathing: dependent   Bathing Comments: sponge bathing and application of lotion while seated on EOB  Upper Extremity Dressing: maximum assistance Comment: pallavi/doffed sling, able to state Left arm to pallavi first but required assistance for sequencing  Lower Extremity Dressing: dependent Comment: assist of three to stand and A for pants up over hips  Toileting: dependent. Toileting Comments: pt incontinent of urine   General Comments: C/o pain and nausea throughout,  Instrumental Activities of Daily Living  No IADL completed on this date. Functional Mobility  Bed Mobility  Rolling Left: maximum assistance  Rolling Right: maximum assistance  Scooting: dependent assistance  Comment: partial roll to left and not complete roll d/t s/p L reverse total shoulder arthroplasty, pt's dtr assisting with rolling during dressing  Transfers  Sit to stand transfer:dependent assistancevicente utilized requiring max Ax3   Stand to sit transfer: dependent assistancevicente utilized requiring max Ax3   Bed / Chair transfer: dependent assistanceosbaldody utilized requiring max Ax3 . Bed / Chair comments: use jing stedy   Functional Mobility  Sitting Balance: stand by assistance, minimal assistance.     Sitting Balance Comment: verbal cues for posture while seated on EOB and in jing vicente  Standing Balance: dependent assistance, max Ax3. Standing Balance Comment: brief standing in jing zhang for LB dressing  No functional mobility completed on this date secondary to unable to tolerate standing. Second Session:  Patient in chair on arrival - complains of intermittent pain in L UE/shoulder and legs with movement - does not rate numerically    Lateral/slide board transfers:    From reclining chair to w/c - max/dep assist of 2 persons, pt able to assist with maintaining sitting balance but unable to    Assist with scooting across board and required scooting assist to get to front of chair and leaning to place board to     Prepare for transfer   From w/c to EOB and end of session with max/dep assist of 2 persons (see above for more detail re: type of assist)     Patient completed 4 attempts at II bars with sit to stand transfers, right hand on bar, max A Of 2 with daughter also at side and   attempting to assist  Pt able to attain lift off from chair of about 5-6\" for 5-10 seconds each trial but unable to attain trunk extension    Multiple AROM exercises for UEs completed from sitting including L elbow flex/ext with elbow supported, supination/pronation,    wrist flexion/ext, composite finger flexion/extension; R UE overhead reach 1x5     Sit to supine to return to bed - max A of 2   Scooting up in bed - max A of 2, pt assisted from a bridging position    LB dressing - dep assist to remove pants, brief and pallavi new brief (pt able to assist in lifting legs for threading, dep assist for up/down over hips, pt able to attain partial lift of of buttocks during bridging     Toileting/pericare -completed from supine in bed at end of session -  incontinent of urine and continued to void urine during brief change, 2 person assist to complete pericare and clothing management     Patient in bed with legs elevated on pillows and needs in reach end of session.  Daughter at side throughout session and remained at bedside assisting patient with lunch end of session.                   Cognition  Overall Cognitive Status: Impaired  Arousal/Alterness: appropriate responses to stimuli  Following Commands: follows one step commands with repetition, follows one step commands with increased time  Attention Span: difficulty attending to directions, difficulty dividing attention  Memory: decreased recall of recent events, decreased short term memory, decreased long term memory  Safety Judgement: decreased awareness of need for assistance, decreased awareness of need for safety  Problem Solving: assistance required to generate solutions, assistance required to implement solutions, decreased awareness of errors, assistance required to identify errors made, assistance required to correct errors made  Insights: decreased awareness of deficits  Initiation: requires cues for all  Sequencing: requires cues for all  Comments: self-limiting, anxious, fearful  Orientation:    oriented to person, oriented to place, oriented to time, oriented to situation, and disoriented to situation cues to remind pt regarding shoulder surgery, pt states \"my shoulder hurts\" but has difficulties stating why  Command Following:   impaired--inconsistent with participation and command following     Education  Barriers To Learning: cognition, hearing, emotional, and physical  Patient Education: patient educated on goals, OT role and benefits, plan of care, precautions, weight-bearing education, family education, transfer training, discharge recommendations  Learning Assessment:  patient verbalizes understanding, would benefit from continued reinforcement    Assessment  Activity Tolerance:poor d/t pain and anxiety/fear  Impairments Requiring Therapeutic Intervention: decreased functional mobility, decreased ADL status, decreased safety awareness, decreased cognition, decreased endurance, decreased balance, increased pain, decreased posture  Prognosis: fair  Clinical Assessment: Pt is not at baseline level of function as she is dependent for dressing, bathing, and toileting. Pt requires SBA for grooming tasks. Pt requires assist of 2-3 people for functional transfers. Pt is nauseas, c/o pain, and verbalizes fear of falling throughout session. Pt will benefit from skilled OT to maximize safety and occupational performance as well as reduce caregiver burden. Safety Interventions: patient left in chair, chair alarm in place, call light within reach, gait belt, patient at risk for falls, nurse notified, and family/caregiver present    Plan  Frequency: 5 x/week, 90 min/day  Current Treatment Recommendations: strengthening, balance training, functional mobility training, transfer training, endurance training, patient/caregiver education, ADL/self-care training, IADL training, home management training, pain management, and safety education    Goals  Patient Goals: Go home   Short Term Goals:  Time Frame: 3 weeks  Patient will complete upper body ADL at minimal assistance   Patient will complete lower body ADL at minimal assistance   Patient will complete toileting at modified independent   Patient will complete self-feeding at Independent   Patient will complete grooming at Independent   Patient will complete functional transfers at modified independent   Patient will complete functional mobility at modified independent    -Goals not yet met this date        Therapy Session Time  First Therapy Time:   Individual Group Co-treatment   Time In   830   Time Out   915   Minutes   45      Second Therapy Time:   Individual Group Co-treatment   Time In   1115   Time Out   1200   Minutes   45     Timed Code Treatment Minutes: 45 + 45     Total Treatment Minutes: 45 + 45        Electronically Signed By:   First session: Lindsey Dorsey OT [id number]   Second session: Taj Joyce OTHERNESTO/BENITA [id number], 10/24/2022, 12:48 PM

## 2022-10-24 NOTE — PLAN OF CARE
Problem: Discharge Planning  Goal: Discharge to home or other facility with appropriate resources  10/24/2022 0957 by Roland Venegas RN  Outcome: Progressing  10/23/2022 2343 by Wilbert Victoria RN  Outcome: Progressing     Problem: Skin/Tissue Integrity  Goal: Absence of new skin breakdown  Description: 1. Monitor for areas of redness and/or skin breakdown  2. Assess vascular access sites hourly  3. Every 4-6 hours minimum:  Change oxygen saturation probe site  4. Every 4-6 hours:  If on nasal continuous positive airway pressure, respiratory therapy assess nares and determine need for appliance change or resting period.   10/24/2022 0957 by Roland Venegas RN  Outcome: Progressing  10/23/2022 2343 by Wilbert Victoria RN  Outcome: Progressing     Problem: Safety - Adult  Goal: Free from fall injury  10/24/2022 0957 by Roland Venegas RN  Outcome: Progressing  10/23/2022 2343 by Wilbert Victoria RN  Outcome: Progressing     Problem: ABCDS Injury Assessment  Goal: Absence of physical injury  10/24/2022 0957 by Roland Venegas RN  Outcome: Progressing  10/23/2022 2343 by Wilbert Victoria RN  Outcome: Progressing     Problem: Pain  Goal: Verbalizes/displays adequate comfort level or baseline comfort level  10/24/2022 0957 by Roland Venegas RN  Outcome: Progressing  10/23/2022 2343 by Wilbert Victoria RN  Outcome: Progressing     Problem: Nutrition Deficit:  Goal: Optimize nutritional status  Outcome: Progressing     Problem: Chronic Conditions and Co-morbidities  Goal: Patient's chronic conditions and co-morbidity symptoms are monitored and maintained or improved  Outcome: Progressing

## 2022-10-24 NOTE — CARE COORDINATION
SW referred patient to [facility]. [facility] will accept patient pending input from patient's daughter. SW spoke with daughter and daughter stating facility will accept patient.   MYAH will follow-up with [facility] in the AM.    Electronically signed by MALINDA Trevizo on 10/24/2022 at 5:42 PM

## 2022-10-24 NOTE — PROGRESS NOTES
[facility]   Nephrology progress Note  [phone]   [facility]. [place]           SUMMARY :  We are following this patient for hyponatremia  80year-old female with past medical history of chronic hyponatremia, hypertension, CHF, presented to the hospital after a fall. At that time her serum sodium was 126. She sees my partner Dr. Devi Porras  for chronic hyponatremia. SUBJECTIVE:    -pt seen and examined  -PMSHx and meds reviewed  -family at bedside    No acute events ON  Na lower  C/o nausea    ROS: neg chest pain/pos nausea    Physical Exam:    VITALS:  BP (!) 142/69   Pulse 71   Temp 97.5 °F (36.4 °C) (Oral)   Resp 16   Ht 5' 5\" (1.651 m)   Wt 163 lb (73.9 kg)   SpO2 93%   BMI 27.12 kg/m²   BLOOD PRESSURE RANGE:  Systolic (11OLV), SAMMI:052 , Min:127 , LOM:308   ; Diastolic (99GWI), SVF:33, Min:69, Max:69    24HR INTAKE/OUTPUT:    Intake/Output Summary (Last 24 hours) at 10/24/2022 0821  Last data filed at 10/24/2022 8794  Gross per 24 hour   Intake --   Output 1400 ml   Net -1400 ml         Gen:  NAD, hard of hearing. In chair. Pallor +, No icterus  CV: RRR no murmur or rub . Lungs:B/ L air entry, Normal breath sounds   Abd: soft, bowel sounds + , No organomegaly   Ext: + edema RLE> LLE edema, no cyanosis. Left arm in sling. Skin: Warm.   No rash  Neuro: nonfocal.      DATA:    CBC with Differential:    Lab Results   Component Value Date/Time    WBC 6.2 10/24/2022 06:03 AM    RBC 3.00 10/24/2022 06:03 AM    HGB 8.8 10/24/2022 06:03 AM    HCT 26.0 10/24/2022 06:03 AM     10/24/2022 06:03 AM    MCV 86.6 10/24/2022 06:03 AM    MCH 29.3 10/24/2022 06:03 AM    MCHC 33.9 10/24/2022 06:03 AM    RDW 14.9 10/24/2022 06:03 AM    LYMPHOPCT 15.3 10/10/2022 02:00 PM    MONOPCT 4.2 10/10/2022 02:00 PM    BASOPCT 0.6 10/10/2022 02:00 PM    MONOSABS 0.4 10/10/2022 02:00 PM    LYMPHSABS 1.5 10/10/2022 02:00 PM    EOSABS 0.0 10/10/2022 02:00 PM    BASOSABS 0.1 10/10/2022 02:00 PM     CMP:    Lab Results   Component Value Date/Time     10/24/2022 06:03 AM    K 4.2 10/24/2022 06:03 AM    K 4.1 10/10/2022 02:00 PM    CL 92 10/24/2022 06:03 AM    CO2 26 10/24/2022 06:03 AM    BUN 24 10/24/2022 06:03 AM    CREATININE <0.5 10/24/2022 06:03 AM    GFRAA >60 10/17/2022 05:22 AM    AGRATIO 1.2 10/10/2022 02:00 PM    LABGLOM >60 10/24/2022 06:03 AM    GLUCOSE 95 10/24/2022 06:03 AM    PROT 6.9 10/10/2022 02:00 PM    LABALBU 3.8 10/10/2022 02:00 PM    CALCIUM 9.1 10/24/2022 06:03 AM    BILITOT 0.4 10/10/2022 02:00 PM    ALKPHOS 69 10/10/2022 02:00 PM    AST 16 10/10/2022 02:00 PM    ALT 11 10/10/2022 02:00 PM     Magnesium:    Lab Results   Component Value Date/Time    MG 1.90 10/24/2022 06:03 AM     Phosphorus:    Lab Results   Component Value Date/Time    PHOS 3.0 10/24/2022 06:03 AM     Uric Acid:  No results found for: LABURIC, URICACID  U/A:    Lab Results   Component Value Date/Time    COLORU Yellow 10/10/2022 07:00 PM    PROTEINU Negative 10/10/2022 07:00 PM    PHUR 6.0 10/10/2022 07:00 PM    WBCUA 2 10/10/2022 07:00 PM    RBCUA 1 10/10/2022 07:00 PM    BACTERIA None Seen 10/10/2022 07:00 PM    CLARITYU Clear 10/10/2022 07:00 PM    SPECGRAV 1.025 10/10/2022 07:00 PM    LEUKOCYTESUR SMALL 10/10/2022 07:00 PM    UROBILINOGEN 1.0 10/10/2022 07:00 PM    BILIRUBINUR Negative 10/10/2022 07:00 PM    BLOODU Negative 10/10/2022 07:00 PM    GLUCOSEU Negative 10/10/2022 07:00 PM         IMPRESSION/RECOMMENDATIONS:      Diagnosis and Plan     Hyponatremia:  urine studies c/w SIADH-pain/nausea  U sodium 72,  urine osmolality 659   AM cortisol 10.3, TSH 1.45   -Na is lower today, 126<--129  -repeat Na at 11am and add salt tabs with lasix   -continue Ure-Na      Hypertension: BP controlled. Left shoulder fracture s/p reverse left shoulder arthroplasty with tuberosity fixation on 10/12/2022: Rehab.     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

## 2022-10-25 ENCOUNTER — APPOINTMENT (OUTPATIENT)
Dept: GENERAL RADIOLOGY | Age: 87
DRG: 560 | End: 2022-10-25
Attending: PHYSICAL MEDICINE & REHABILITATION
Payer: MEDICARE

## 2022-10-25 VITALS
HEART RATE: 77 BPM | DIASTOLIC BLOOD PRESSURE: 67 MMHG | RESPIRATION RATE: 16 BRPM | OXYGEN SATURATION: 94 % | BODY MASS INDEX: 27.16 KG/M2 | TEMPERATURE: 97.7 F | SYSTOLIC BLOOD PRESSURE: 134 MMHG | WEIGHT: 163 LBS | HEIGHT: 65 IN

## 2022-10-25 LAB
ANION GAP SERPL CALCULATED.3IONS-SCNC: 10 MMOL/L (ref 3–16)
BUN BLDV-MCNC: 22 MG/DL (ref 7–20)
CALCIUM SERPL-MCNC: 9.5 MG/DL (ref 8.3–10.6)
CHLORIDE BLD-SCNC: 92 MMOL/L (ref 99–110)
CO2: 27 MMOL/L (ref 21–32)
CREAT SERPL-MCNC: <0.5 MG/DL (ref 0.6–1.2)
GFR SERPL CREATININE-BSD FRML MDRD: >60 ML/MIN/{1.73_M2}
GLUCOSE BLD-MCNC: 94 MG/DL (ref 70–99)
POTASSIUM SERPL-SCNC: 4.1 MMOL/L (ref 3.5–5.1)
SODIUM BLD-SCNC: 129 MMOL/L (ref 136–145)

## 2022-10-25 PROCEDURE — 36415 COLL VENOUS BLD VENIPUNCTURE: CPT

## 2022-10-25 PROCEDURE — 6370000000 HC RX 637 (ALT 250 FOR IP): Performed by: INTERNAL MEDICINE

## 2022-10-25 PROCEDURE — 99024 POSTOP FOLLOW-UP VISIT: CPT | Performed by: NURSE PRACTITIONER

## 2022-10-25 PROCEDURE — 97530 THERAPEUTIC ACTIVITIES: CPT

## 2022-10-25 PROCEDURE — 97110 THERAPEUTIC EXERCISES: CPT

## 2022-10-25 PROCEDURE — 97535 SELF CARE MNGMENT TRAINING: CPT

## 2022-10-25 PROCEDURE — 6360000002 HC RX W HCPCS: Performed by: PHYSICAL MEDICINE & REHABILITATION

## 2022-10-25 PROCEDURE — 73030 X-RAY EXAM OF SHOULDER: CPT

## 2022-10-25 PROCEDURE — 80048 BASIC METABOLIC PNL TOTAL CA: CPT

## 2022-10-25 PROCEDURE — APPNB45 APP NON BILLABLE 31-45 MINUTES: Performed by: NURSE PRACTITIONER

## 2022-10-25 PROCEDURE — 6370000000 HC RX 637 (ALT 250 FOR IP): Performed by: PHYSICAL MEDICINE & REHABILITATION

## 2022-10-25 RX ORDER — FUROSEMIDE 20 MG/1
10 TABLET ORAL DAILY
Qty: 15 TABLET | Refills: 0
Start: 2022-10-25

## 2022-10-25 RX ORDER — TRAMADOL HYDROCHLORIDE 50 MG/1
50 TABLET ORAL EVERY 4 HOURS PRN
Qty: 12 TABLET | Refills: 0 | Status: SHIPPED | OUTPATIENT
Start: 2022-10-25 | End: 2022-10-28

## 2022-10-25 RX ADMIN — TRAMADOL HYDROCHLORIDE 50 MG: 50 TABLET ORAL at 04:33

## 2022-10-25 RX ADMIN — POLYETHYLENE GLYCOL 3350 17 G: 17 POWDER, FOR SOLUTION ORAL at 09:22

## 2022-10-25 RX ADMIN — SODIUM CHLORIDE TAB 1 GM 1 G: 1 TAB at 09:21

## 2022-10-25 RX ADMIN — ACETAMINOPHEN 1000 MG: 500 TABLET ORAL at 09:20

## 2022-10-25 RX ADMIN — ENOXAPARIN SODIUM 40 MG: 100 INJECTION SUBCUTANEOUS at 09:19

## 2022-10-25 RX ADMIN — FUROSEMIDE 10 MG: 20 TABLET ORAL at 09:21

## 2022-10-25 RX ADMIN — TRAMADOL HYDROCHLORIDE 50 MG: 50 TABLET ORAL at 09:21

## 2022-10-25 RX ADMIN — ONDANSETRON 4 MG: 4 TABLET, ORALLY DISINTEGRATING ORAL at 10:51

## 2022-10-25 RX ADMIN — CETIRIZINE HYDROCHLORIDE 5 MG: 10 TABLET, FILM COATED ORAL at 09:21

## 2022-10-25 RX ADMIN — Medication 30 G: at 09:22

## 2022-10-25 RX ADMIN — NIFEDIPINE 30 MG: 30 TABLET, EXTENDED RELEASE ORAL at 09:21

## 2022-10-25 RX ADMIN — SODIUM CHLORIDE TAB 1 GM 1 G: 1 TAB at 12:46

## 2022-10-25 RX ADMIN — ACETAMINOPHEN 1000 MG: 500 TABLET ORAL at 14:31

## 2022-10-25 ASSESSMENT — PAIN DESCRIPTION - DESCRIPTORS
DESCRIPTORS: ACHING
DESCRIPTORS: ACHING

## 2022-10-25 ASSESSMENT — PAIN DESCRIPTION - LOCATION
LOCATION: ARM;HAND
LOCATION: SHOULDER

## 2022-10-25 ASSESSMENT — PAIN DESCRIPTION - ORIENTATION
ORIENTATION: LEFT
ORIENTATION: LEFT

## 2022-10-25 ASSESSMENT — PAIN SCALES - WONG BAKER: WONGBAKER_NUMERICALRESPONSE: 0

## 2022-10-25 ASSESSMENT — PAIN SCALES - GENERAL
PAINLEVEL_OUTOF10: 5
PAINLEVEL_OUTOF10: 4
PAINLEVEL_OUTOF10: 4

## 2022-10-25 ASSESSMENT — PAIN DESCRIPTION - FREQUENCY: FREQUENCY: CONTINUOUS

## 2022-10-25 ASSESSMENT — PAIN DESCRIPTION - ONSET: ONSET: ON-GOING

## 2022-10-25 ASSESSMENT — PAIN DESCRIPTION - PAIN TYPE: TYPE: SURGICAL PAIN

## 2022-10-25 NOTE — PLAN OF CARE
Problem: Discharge Planning  Goal: Discharge to home or other facility with appropriate resources  10/25/2022 1524 by Rajni Jacome RN  Outcome: Completed  10/25/2022 1002 by Rajni Jacome RN  Outcome: Progressing     Problem: Skin/Tissue Integrity  Goal: Absence of new skin breakdown  Description: 1. Monitor for areas of redness and/or skin breakdown  2. Assess vascular access sites hourly  3. Every 4-6 hours minimum:  Change oxygen saturation probe site  4. Every 4-6 hours:  If on nasal continuous positive airway pressure, respiratory therapy assess nares and determine need for appliance change or resting period.   10/25/2022 1524 by Rajni Jacome RN  Outcome: Completed  10/25/2022 1002 by Rajni Jacome RN  Outcome: Progressing     Problem: Safety - Adult  Goal: Free from fall injury  10/25/2022 1524 by Rajni Jacome RN  Outcome: Completed  10/25/2022 1002 by Rajni Jacome RN  Outcome: Progressing     Problem: ABCDS Injury Assessment  Goal: Absence of physical injury  10/25/2022 1524 by Rajni Jacome RN  Outcome: Completed  10/25/2022 1002 by Rajni Jacome RN  Outcome: Progressing     Problem: Pain  Goal: Verbalizes/displays adequate comfort level or baseline comfort level  10/25/2022 1524 by Rajni Jacome RN  Outcome: Completed  10/25/2022 1002 by Rajni Jacome RN  Outcome: Progressing     Problem: Nutrition Deficit:  Goal: Optimize nutritional status  10/25/2022 1524 by Rajni Jacome RN  Outcome: Completed  10/25/2022 1002 by Rajni Jacome RN  Outcome: Progressing     Problem: Chronic Conditions and Co-morbidities  Goal: Patient's chronic conditions and co-morbidity symptoms are monitored and maintained or improved  10/25/2022 1524 by Rajni Jacome RN  Outcome: Completed  10/25/2022 1002 by Rajni Jacome RN  Outcome: Progressing

## 2022-10-25 NOTE — DISCHARGE SUMMARY
Physical Medicine & Rehabilitation  Discharge Summary     Patient Identification:  Jocelyne Ac  : 1934  Admit date: 10/14/2022  Discharge date:   10/25/2022  Attending provider: Fiazan Velazquez MD        Primary care provider: Ricky Winter MD     Discharge Diagnoses:   Patient Active Problem List   Diagnosis    Closed displaced fracture of surgical neck of humerus, initial encounter    Osteoporosis with current pathological fracture, initial encounter    Hyponatremia    Left supracondylar humerus fracture, sequela       Discharge Functional Status:      Physical therapy:  Supine to Sit: Dependent  Sit to Supine: Dependent      Sit to Stand: Dependent  Chair/Bed to Chair Transfer: Dependent  Car Transfer:       Ambulation 10 ft:    Ambulation 50 ft:    Ambulation 150 ft:    Stairs - 1 Step:    Stairs - 4 Step:    Stairs - 12 Step:      Occupational therapy:  Eating: Supervision or touching assistance  Oral Hygiene: Supervision or touching assistance  Bathing: Substantial/maximal assistance  Upper Body Dressing: Substantial/maximal assistance  Lower Body Dressing: Dependent     Toilet Transfer: Dependent  Toilet Hygiene: Dependent    Speech therapy:         Inpatient Rehabilitation Course: Jocelyne Ac is a 80 y.o. female admitted to inpatient rehabilitation on 10/14/2022 s/p Left supracondylar humerus fracture, sequela. The patient participated in an aggressive multidisciplinary inpatient rehabilitation program involving 3 hours of therapy per day, at least 5 days per week. It was determined that aggressive 3-hour therapy was too much for this patient to sustain and she was transition to a skilled nursing facility to continue less intensive therapies. Her medical rehab course while on ARU was as below:    Displaced left proximal humeral fracture: Left reverse shoulder arthroplasty on 10/12 with Dr Lupe Martinez. MIRIAM DYER. Continue sling. OP ortho followup.  Pain controlled with tylenol and tramadol. Acute on chronic hyponatremia-Nephrology followed. SIADH picture. Continue salt tab, fluid restriction and Urea at discharge. Hypertension: nifedipine XL     Discharge Exam:  Patient Vitals for the past 24 hrs:   BP Temp Temp src Pulse Resp SpO2   10/25/22 0503 -- -- -- -- 16 --   10/25/22 0433 -- -- -- -- 16 --   10/24/22 2045 127/70 97.5 °F (36.4 °C) Oral 72 18 94 %   10/24/22 1734 -- -- -- -- 16 --   10/24/22 0930 123/68 97.3 °F (36.3 °C) Oral 68 16 97 %     Gen: No distress, pleasant. Resting in bed  HEENT: Normocephalic, atraumatic. CV: Regular rate and rhythm. No MRG   Resp: No respiratory distress. CTAB   Abd: Soft, nontender nondistended  Ext: No edema. LUE incision well approximated with staples, no erythema or drainage  Neuro: Alert, oriented, appropriately interactive. Significant Diagnostics:   Lab Results   Component Value Date    CREATININE <0.5 (L) 10/24/2022    BUN 24 (H) 10/24/2022     (L) 10/24/2022    K 4.2 10/24/2022    CL 92 (L) 10/24/2022    CO2 26 10/24/2022       Lab Results   Component Value Date    WBC 6.2 10/24/2022    HGB 8.8 (L) 10/24/2022    HCT 26.0 (L) 10/24/2022    MCV 86.6 10/24/2022     10/24/2022       Disposition:  SNF in stable condition.     Follow-up:  See after visit summary from hospitalization    Discharge Medications:     Medication List        START taking these medications      furosemide 20 MG tablet  Commonly known as: LASIX  Take 0.5 tablets by mouth daily     urea 15 g Pack packet  Commonly known as: URE-NA  Take 30 g by mouth daily            CONTINUE taking these medications      aspirin 81 MG EC tablet  Notes to patient: Use: To ease pain  Side Effects:  Headache, gas, nausea and vomiting     b complex vitamins capsule  Notes to patient: Uses: supplement of vitamin B and C complex for renal patients  Side Effects: upset stomach, nausea, vomiting     docusate sodium 100 MG capsule  Commonly known as: COLACE  Notes to patient: Uses: Relieving occasional constipation  Side Effects: Bloating , Cramping, gas, diarrhea     loratadine 10 MG tablet  Commonly known as: CLARITIN  Notes to patient: Uses: Antihistamine for allergies  Side Effects: Headache, fatigue, dry mouth     magnesium oxide 400 MG tablet  Commonly known as: MAG-OX  Notes to patient: Use:  a laxative used for constipation  Side Effects:  diarrhea and decreased sense of taste     NIFEdipine 30 MG extended release tablet  Commonly known as: ADALAT CC  Notes to patient: Uses: Calcuim channel blocker used for the treatment of hypertension  Side Effects: dizziness, weakness, joint pain, swelling of feet and ankles      sodium chloride 1 g tablet  Take 1 tablet by mouth 3 times daily (with meals) for 14 days  Notes to patient: Use:  To absorb and transport nutrients, maintain blood pressure, and maintain the right balance of fluid. Side Effects:  swelling of hands, feet , and ankles     traMADol 50 MG tablet  Commonly known as: ULTRAM  Take 1 tablet by mouth every 4 hours as needed for Pain for up to 3 days. STOP taking these medications      lidocaine 5 %  Commonly known as: LIDODERM     potassium chloride 10 MEQ extended release capsule  Commonly known as: MICRO-K               Where to Get Your Medications        You can get these medications from any pharmacy    Bring a paper prescription for each of these medications  traMADol 50 MG tablet       Information about where to get these medications is not yet available    Ask your nurse or doctor about these medications  furosemide 20 MG tablet  urea 15 g Pack packet         I spent over 35 minutes on this discharge encounter between counseling, coordination of care, and medication reconciliation. To comply with [place] bylaw R.II.4.1:  Discharge order placed in advance to facilitate patient's discharge needs. Kelly Tang MD    * This document was created using dictation software.   While all precautions were taken to ensure accuracy, errors may have occurred. Please disregard any typographical errors.

## 2022-10-25 NOTE — PLAN OF CARE
Problem: Discharge Planning  Goal: Discharge to home or other facility with appropriate resources  10/25/2022 1002 by Bryan Mccormick RN  Outcome: Progressing  10/24/2022 2315 by Christiano Castillo RN  Outcome: Progressing     Problem: Skin/Tissue Integrity  Goal: Absence of new skin breakdown  Description: 1. Monitor for areas of redness and/or skin breakdown  2. Assess vascular access sites hourly  3. Every 4-6 hours minimum:  Change oxygen saturation probe site  4. Every 4-6 hours:  If on nasal continuous positive airway pressure, respiratory therapy assess nares and determine need for appliance change or resting period.   10/25/2022 1002 by Bryan Mccormick RN  Outcome: Progressing  10/24/2022 2315 by Christiano Castillo RN  Outcome: Progressing     Problem: Safety - Adult  Goal: Free from fall injury  10/25/2022 1002 by Bryan Mccormick RN  Outcome: Progressing  10/24/2022 2315 by Christiano Castillo RN  Outcome: Progressing     Problem: ABCDS Injury Assessment  Goal: Absence of physical injury  10/25/2022 1002 by Bryan Mccormick RN  Outcome: Progressing  10/24/2022 2315 by Christiano Castillo RN  Outcome: Progressing     Problem: Pain  Goal: Verbalizes/displays adequate comfort level or baseline comfort level  10/25/2022 1002 by Bryan Mccormick RN  Outcome: Progressing  10/24/2022 2315 by Christiano Castillo RN  Outcome: Progressing     Problem: Chronic Conditions and Co-morbidities  Goal: Patient's chronic conditions and co-morbidity symptoms are monitored and maintained or improved  Outcome: Progressing     Problem: Nutrition Deficit:  Goal: Optimize nutritional status  Outcome: Progressing

## 2022-10-25 NOTE — PROGRESS NOTES
Discharge instructions called and given to Ana. 1515: Patient discharged via stretcher and all her personal belongings with daughter at side.

## 2022-10-25 NOTE — PLAN OF CARE
Problem: Discharge Planning  Goal: Discharge to home or other facility with appropriate resources  10/24/2022 2315 by Jessika Gonzalez RN  Outcome: Progressing     Problem: Skin/Tissue Integrity  Goal: Absence of new skin breakdown  Description: 1. Monitor for areas of redness and/or skin breakdown  2. Assess vascular access sites hourly  3. Every 4-6 hours minimum:  Change oxygen saturation probe site  4. Every 4-6 hours:  If on nasal continuous positive airway pressure, respiratory therapy assess nares and determine need for appliance change or resting period.   10/24/2022 2315 by Jessika Gonzalez RN  Outcome: Progressing     Problem: Safety - Adult  Goal: Free from fall injury  10/24/2022 2315 by Jessika Gonzalez RN  Outcome: Progressing     Problem: ABCDS Injury Assessment  Goal: Absence of physical injury  10/24/2022 2315 by Jessika Gonzalez RN  Outcome: Progressing     Problem: Pain  Goal: Verbalizes/displays adequate comfort level or baseline comfort level  10/24/2022 2315 by Jessika Gonzalez RN  Outcome: Progressing

## 2022-10-25 NOTE — DISCHARGE INSTRUCTIONS
We hope your stay on rehab has exceeded your expectations. Once again the entire Acute Rehab Staff at [facility] wish to thank you for allowing us the privilege to care for you. A few days after you are discharged from Rehab, you will receive a survey [employer]) in the mail. This is a nationally distributed survey sent to thousands of rehab patients throughout the nation. It is very important to the staff and Dr. Jenene Moritz to receive feedback based on your experience on the Rehab Unit. Thank you, we wish you good health always,         Acute Rehab Team      Hospital Preference:     [facility] [address] Diagnosis/Conditions    _______________________ (free text)    Emergency Contact:    ________________________________________Phone#________________________      Advanced Directives:    Code Status: ?  []  Full Code  ? []  DNR  ? []  [facility]  ? []  [facility] - Arrest    (as of date of discharge:  _________)      Medical POA: ?  []   Yes ______________________________ ?   []   No                                       (Name and phone number)                     Living Will:   ?   []   Yes    ?  []   No        Insurance Information:    _______________________ (free text)      Individual Responsible  for the coordination of the discharge/follow up:    ______________________________________________________    Functional Status:    VISUAL DEFICITS:    Yes []  No  []       If yes, assisted device:   Wears Glasses Yes []  No  []  Wears Contacts  Yes []  No  []  Legally Blind Yes []  No  []    HEARING DEFICITS:    Yes []  No  []       If yes, assisted device:   Wears Hearing Aids Yes []  No  []  Pocket Talker  Yes []  No  []       Physical Therapist & Contact #: Stella John [phone]  OccupationalTherapist & Contact #:  Speech Therapist & Contact #:       Restrictions (as of 10/25/2022):     Please continue to reinforce the patient keep her arm in front of her at all times. - WB status:  NWB; continue sling -   BID exercise program (10 reps each time):              1) passive ER with stick               2) passive FE using pulley or opposite hand to shoulder level only              3) shoulder shrugs, scapular protraction and retraction exercises              4) continue with elbow, forearm, wrist, finger ROM     Activities of Daily Living:     ADL's - Adaptive Equipment used _____________________ (free text)     []  Independent ? []  Modified Independent ? []  Supervision                []  Minimal Assistance ? []  Moderate Assistance ? []  Maximal Assistance      Driving Restriction:      [x]  YES   [] NO     Mobility:     Transfers: [facility] Whalen requires maxA of 2 with slide board or the use of lift equipment for safe transfers. Wheelchair:     ? []  Independent ? []  Modified Independent ? []  Supervision                 []  Minimal Assistance  ? []  Moderate Assistance ? [x]  Maximal Assistance       Current Diet Consistency:?       []  Regular   [] Soft and Bite-Sized  ? [] Minced and Moist     ?[]  Puree     Current Liquid Level:?         [] Thin Liquids     [] Mildly Thick (Nectar) ?     [] Moderately Thick (Honey)    [] Pudding Thick    [] Marisol Primas Water Protocol     Dietary Restrictions:?      []  General Diet   []  Tube Feed, w/ Diet   []  Tube Feed, NPO   []  Carb Control   []  Cardiac   []  Renal    []  Other:

## 2022-10-25 NOTE — PROGRESS NOTES
[facility]   Nephrology progress Note  [phone] [id number] [address] [employer] [facility]           SUMMARY :  We are following this patient for hyponatremia  80year-old female with past medical history of chronic hyponatremia, hypertension, CHF, presented to the hospital after a fall. At that time her serum sodium was 126. She sees my partner Dr. Ekta Pedersen  for chronic hyponatremia. SUBJECTIVE:    -pt seen and examined  -PMSHx and meds reviewed  -family at bedside    Events noted  Started on lasix/salt tabs   Labs pending today    ROS: neg chest pain/pos nausea    Physical Exam:    VITALS:  /70   Pulse 72   Temp 97.5 °F (36.4 °C) (Oral)   Resp 16   Ht 5' 5\" (1.651 m)   Wt 163 lb (73.9 kg)   SpO2 94%   BMI 27.12 kg/m²   BLOOD PRESSURE RANGE:  Systolic (35RDI), GUX:751 , Min:123 , EJN:738   ; Diastolic (41LND), NBB:54, Min:68, Max:70    24HR INTAKE/OUTPUT:    Intake/Output Summary (Last 24 hours) at 10/25/2022 0619  Last data filed at 10/25/2022 0428  Gross per 24 hour   Intake 180 ml   Output 700 ml   Net -520 ml         Gen:  NAD, hard of hearing. In chair. Pallor +, No icterus  CV: RRR no murmur or rub . Lungs:B/ L air entry, Normal breath sounds   Abd: soft, bowel sounds + , No organomegaly   Ext: + edema RLE> LLE edema, no cyanosis. Left arm in sling. Skin: Warm.   No rash  Neuro: nonfocal.      DATA:    CBC with Differential:    Lab Results   Component Value Date/Time    WBC 6.2 10/24/2022 06:03 AM    RBC 3.00 10/24/2022 06:03 AM    HGB 8.8 10/24/2022 06:03 AM    HCT 26.0 10/24/2022 06:03 AM     10/24/2022 06:03 AM    MCV 86.6 10/24/2022 06:03 AM    MCH 29.3 10/24/2022 06:03 AM    MCHC 33.9 10/24/2022 06:03 AM    RDW 14.9 10/24/2022 06:03 AM    LYMPHOPCT 15.3 10/10/2022 02:00 PM    MONOPCT 4.2 10/10/2022 02:00 PM    BASOPCT 0.6 10/10/2022 02:00 PM    MONOSABS 0.4 10/10/2022 02:00 PM    LYMPHSABS 1.5 10/10/2022 02:00 PM    EOSABS 0.0 10/10/2022 02:00 PM    BASOSABS 0.1 10/10/2022 02:00 PM     CMP:    Lab Results   Component Value Date/Time     10/24/2022 12:44 PM    K 4.2 10/24/2022 06:03 AM    K 4.1 10/10/2022 02:00 PM    CL 92 10/24/2022 06:03 AM    CO2 26 10/24/2022 06:03 AM    BUN 24 10/24/2022 06:03 AM    CREATININE <0.5 10/24/2022 06:03 AM    GFRAA >60 10/17/2022 05:22 AM    AGRATIO 1.2 10/10/2022 02:00 PM    LABGLOM >60 10/24/2022 06:03 AM    GLUCOSE 95 10/24/2022 06:03 AM    PROT 6.9 10/10/2022 02:00 PM    LABALBU 3.8 10/10/2022 02:00 PM    CALCIUM 9.1 10/24/2022 06:03 AM    BILITOT 0.4 10/10/2022 02:00 PM    ALKPHOS 69 10/10/2022 02:00 PM    AST 16 10/10/2022 02:00 PM    ALT 11 10/10/2022 02:00 PM     Magnesium:    Lab Results   Component Value Date/Time    MG 1.90 10/24/2022 06:03 AM     Phosphorus:    Lab Results   Component Value Date/Time    PHOS 3.0 10/24/2022 06:03 AM     Uric Acid:  No results found for: Troy Maul  U/A:    Lab Results   Component Value Date/Time    COLORU Yellow 10/10/2022 07:00 PM    PROTEINU Negative 10/10/2022 07:00 PM    PHUR 6.0 10/10/2022 07:00 PM    WBCUA 2 10/10/2022 07:00 PM    RBCUA 1 10/10/2022 07:00 PM    BACTERIA None Seen 10/10/2022 07:00 PM    CLARITYU Clear 10/10/2022 07:00 PM    SPECGRAV 1.025 10/10/2022 07:00 PM    LEUKOCYTESUR SMALL 10/10/2022 07:00 PM    UROBILINOGEN 1.0 10/10/2022 07:00 PM    BILIRUBINUR Negative 10/10/2022 07:00 PM    BLOODU Negative 10/10/2022 07:00 PM    GLUCOSEU Negative 10/10/2022 07:00 PM         IMPRESSION/RECOMMENDATIONS:      Diagnosis and Plan     Hyponatremia:  urine studies c/w SIADH-pain/nausea contributing  AM cortisol 10.3, TSH 1.45   -Na pending today, started on salt tabs and lasix due to worsening hypernatremia  -labs pending today  -continue Ure-Na      Hypertension: BP controlled. Left shoulder fracture s/p reverse left shoulder arthroplasty with tuberosity fixation on 10/12/2022: Rehab.     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

## 2022-10-25 NOTE — CARE COORDINATION
Discharge Plan:      Patient discharged to: [facility]  PHONE: [phone] ask for 100 betts. FAX: [phone]  SW/DC Planner faxed, QUIQUE and AVS   Narcotic Prescriptions faxed were: not applicable  RN: Sonali Garvey will call report      Medical Transport with: [facility] [phone]   time: 3:00 PM  Family advised of discharge?: Yes  HENS Submitted?:  Yes  All discharge needs met per case management.     MALINDA Smith, [address] [facility]  [phone]

## 2022-10-25 NOTE — PROGRESS NOTES
[facility] Orthopedic Surgery   Progress Note    CHIEF COMPLAINT/DIAGNOSIS: S/p left reverse total Shoulder Arthroplasty    SUBJECTIVE: The patient was seen sitting up in the chair. She is planning on d/c to SNF this afternoon. Mild pain in the shoulder. Baseline peripheral neuropathy unchanged. OBJECTIVE  Physical    VITALS:  /70   Pulse 72   Temp 97.5 °F (36.4 °C) (Oral)   Resp 16   Ht 5' 5\" (1.651 m)   Wt 163 lb (73.9 kg)   SpO2 94%   BMI 27.12 kg/m²     GENERAL: Alert and oriented x3, in no acute distress. MUSCULOSKELETAL: Able to flex and extend the wrist on the operative side without issue. INCISION:  Covered with post-op dressing; clean, dry and intact. ROM: left shoulder deferred. Standard sling in place. Intact to light touch in axillary, median radial, ulnar distributions. Vascular:   2+ radial pulses with brisk cap refill. Data    ALL MEDICATIONS HAVE BEEN REVIEWED    CBC:   Recent Labs     10/24/22  0603   WBC 6.2   HGB 8.8*   HCT 26.0*        BMP:   Recent Labs     10/23/22  1208 10/24/22  0603 10/24/22  1244   * 126* 126*   K 4.0 4.2  --    CL 96* 92*  --    CO2 25 26  --    PHOS  --  3.0  --    BUN 23* 24*  --    CREATININE <0.5* <0.5*  --      INR:   No results for input(s): INR in the last 72 hours. New plain films ordered and are pending. ASSESSMENT:  S/p left reverse total Shoulder Arthroplasty for fracture (10/12/2022), POD#13  Hyponatremia  Acute postop blood loss anemia as expected    PLAN:   Transitioned to standard sling. Given the patient's body habitus and posture, she cannot maintain the ER immobilizer in an appropriate position. Please continue to reinforce the patient keep her arm in front of her at all times.    - WB status:  NWB; continue sling -   BID exercise program (10 reps each time):              1) passive ER with stick               2) passive FE using pulley or opposite hand to shoulder level only              3) shoulder shrugs, scapular protraction and retraction exercises              4) continue with elbow, forearm, wrist, finger ROM     - OT/PT  - Pain Control  - Expected post op acute blood loss anemia: 8.8/26 on 10/24  - Dispo: per ARU    Follow-up with Dr. Do Bryson in approximately 4 weeks. Office# [phone]  No future appointments.     [facility], APRN - CNP  10/25/2022  8:45 AM

## 2022-10-25 NOTE — CARE COORDINATION
MYAH contacted Holly Medina, Admission Coordinator @ [facility]. Holly Medina stating that patient can admitted to the facility when she is medically stable. MYAH will follow.     Electronically signed by MALINDA Hickey on 10/25/2022 at 8:41 AM

## 2022-10-25 NOTE — PROGRESS NOTES
[facility]   Phone: [phone]    Occupational Therapy    [] Initial Evaluation            [x] Daily Treatment Note         [x] Discharge Summary      Patient: Cristina Villanueva   : 1934   MRN: [id number]   Date of Service:  10/25/2022    Admitting Diagnosis:  Left supracondylar humerus fracture, sequela  Current Admission Summary:  68-year-old female with PMHx of CHF, hypertension, chronic hyponatremia and SIADH presented after sustaining a fall while going to the bathroom. Patient reported pain in her left shoulder and right hip. X-ray revealed a communicated fracture of left humeral neck with humeral shaft displaced. CT head negative for any acute intracranial pathology. Patient taken to surgery by Dr. Yeyo Flaherty on 10/12 for a repair of her Left comminuted and displaced proximal humerus fracture with a Left reverse shoulder arthroplasty with tuberosity fixation. Post op, patient started in therapies, and patient is able to tolerate 3 hours of therapy 5 days per week and is medically appropriate for rehab on the Acute Rehabilitation Unit. Patient has regular Medicare insurance. She is agreeable to rehab unit treatment. Patient lives at home with her daughter in a two-level house, but she does have a stair lift system in place. Past Medical History:  has a past medical history of CHF (congestive heart failure) ([address] and Hypertension. Past Surgical History:  has a past surgical history that includes Cholecystectomy;  section; and shoulder surgery (Left, 10/12/2022).     Discharge Recommendations:24/7 Assist and SUP, ECF for continued therapy     DME Required For Discharge: DME to be determined pending patient progress, ashutosh lift and BSC    Precautions/Restrictions: high fall risk, contact precautions, weight bearing, ROM restrictions  Weight Bearing Restrictions: non weight bearing  [] Right Upper Extremity  [x] Left Upper Extremity [] Right Lower Extremity  [] Left Lower Extremity     Required Braces/Orthotics:  abductor sling   [] Right  [x] Left  Positional Restrictions: no PROM or AROM L shoulder movement, okay for AROM elbow/forearm, wrist and hand  Comment: per Warren Ramírez note on 10/25/22:       Please continue to reinforce the patient keep her arm in front of her at all times.    - WB status:  NWB; continue sling -   BID exercise program (10 reps each time):              1) passive ER with stick               2) passive FE using pulley or opposite hand to shoulder level only              3) shoulder shrugs, scapular protraction and retraction exercises              4) continue with elbow, forearm, wrist, finger ROM     Prior Level of Function  Lives With: daughter  \"Samanta\" Israel Denson has arthritis in her R hip which prevents hip flexion/extension, Amina Duffy works from home)             Type of Home: house  Home Layout: two level, 1/2 bath on main level, bedroom/bathroom upstairs, chair lift to 2nd level  Home Access:  2 step to enter without rails , has portable ramp   Bathroom Layout: tub/shower unit, walk in shower, uses [employer] is unsure if transport chair can go in/out of bathroom  Bathroom Equipment: grab bars in shower, shower chair, toilet raiser  Toilet Height: standard height  Home Equipment: rolling walker, transport wheelchair, reacher, oxygen only at night; purewick at [employer]: pt sleeps in a standard bed with arm rails  Transfer Assistance: Modified Independent with GB  Ambulation Assistance:modified independent with use of FWW in house, w/c in community  ADL Assistance: requires assistance with toileting, dressing, and bathing, dtr provides setup assistance for grooming tasks and feeing  IADL Assistance: requires assistance with all homemaking tasks  Active :        [] Yes                 [x] No daughter provides transportation  Hand Dominance: [] Left                 [x] Right  Hobbies: enjoys reading the paper, watching TV  Recent Falls: 1 recent fall in last 6 months prior to this fall that lead to this admission  Comment: per chart review-  pt came to hospital ED on 9/1/22 5/6/22, and 11/3/21 for falls, pt with recent rehab stay at [facility] in September 2022 (pt there for a few days)      Subjective  General: Pt supine in bed upon arival, agreeable to OT/PT co tx. Pt's dtr present throughout session. At the end of session, pt stated she had to go to the bathroom. Pt refusing to complete toilet transfer using maxi elvis to [facility] or use of jing stedy to toilet. Pain: Patient does not rate upon questioning - reports pain with movement/repositioning, no complaint of pain at rest   Pain Interventions: pain medication in place prior to arrival and repositioned , pain medication administered 15 minutes prior to OT/PT arrival.      Activities of Daily Living  Basic Activities of Daily Living  Upper Extremity Dressing: maximum assistance Comment: doffed issa, LINA to thread LUE, pt used RUE to pull shirt over head, required mod A to thread RUE into sleeve, A for shirt down back  Lower Extremity Dressing: dependent Comment: assist of two  to  [address] [place] [address] stedy from elevated bed height and A for pants/pull ups up over hips  Dressing Comments: seated on EOB with tactile and verbal cues for posture  Toileting: dependent. Toileting Comments: pt incontinent of urine, used purewick at start of session   General Comments: C/o pain and nausea throughout  Functional Mobility  Bed Mobility  Supine to Sit: maximum assistance  Comment: max cues for sequencing, mod A for LE and max A for UB  Transfers  Sit to stand transfer:dependent assistance, stedy utilized requiring max Ax2   Stand to sit transfer: dependent assistance, stedy utilized requiring max Ax2   Bed / Chair transfer: stedy utilized requiring max Ax2 .     Bed / Chair comments: use jing stedy, cues for posture, pt c/o of hitting head at end of session, did not tolerate well Comments: 4x sit to stand attempted from EOB, other attempts were unsucessful as pt continues to have forward flex posture and lean to the L  Functional Mobility  Sitting Balance: stand by assistance, minimal assistance. Sitting Balance Comment: pt with forward flexed posture, leaning to the L, verbal cues for posture while seated on EOB (~20 minutes) and in [facility], mod verbal cues for weight bearing status  Standing Balance: dependent assistance, max Ax3. Standing Balance Comment: brief standing 5 seconds)  in [facility] for LB dressing  No functional mobility completed on this date secondary to unable to tolerate standing.     Second session   Patient in reclining chair finishing lunch on arrival - observed to be eating ice cream with spoon mod I - pt reports she requires set up assist for cutting food at this time as well as set up assist for grooming (declines to groom, states she completes with daughter daily)    Sling removed for exercises - reviewed advancement of exercises per Bigg Platt NP and completed              1) passive ER with therapist assist 1x10 with PROM limited by patient tolerance to about 10 degrees past neutral               2) passive FE with therapist assist 1x10 to PROM limited by patient tolerance to about 30 degrees               3) shoulder shrugs, scapular protraction and retraction exercises each 1x10              4) continue with elbow, forearm, wrist, finger ROM each 1x10    Maxi elvis used to return to bed with assist of two persons to place pad prior to transfer and to remove after transfer, max A for rolling in bed     Pt reports incontinence of urine and passing gas    Toileting - dep assist for pericare and clothing management, attempted to have BM from bed pan without success, encouraged to try getting to toilet next time she has urge to void bowels with use of lift equipment - RN in to assess skin during pericare and new mepilex placed on sacrum       LB dressing - dep assist to doff and pallavi brief and pants - pt assisted with bridging with minimal lift off from bed - able to lift LEs off bed to assist in threading pants     Rolling R and L - max A of 1     Patient remained in bed with heels floated and L arm supported on pillow end of session - bed alarm engaged and needs in reach.          Cognition  Overall Cognitive Status: Impaired  Arousal/Alterness: appropriate responses to stimuli  Following Commands: follows one step commands with repetition, follows one step commands with increased time  Attention Span: difficulty attending to directions, difficulty dividing attention  Memory: decreased recall of recent events, decreased short term memory, decreased long term memory  Safety Judgement: decreased awareness of need for assistance, decreased awareness of need for safety  Problem Solving: assistance required to generate solutions, assistance required to implement solutions, decreased awareness of errors, assistance required to identify errors made, assistance required to correct errors made  Insights: decreased awareness of deficits  Initiation: requires cues for all  Sequencing: requires cues for all  Comments: self-limiting, anxious, fearful  Orientation:    oriented to person, oriented to place, oriented to time, oriented to situation, and disoriented to situation cues to remind pt regarding shoulder surgery, pt states \"my shoulder hurts\" but has difficulties stating why  Command Following:   impaired--inconsistent with participation and command following     Education  Barriers To Learning: cognition, hearing, emotional, and physical  Patient Education: patient educated on goals, OT role and benefits, plan of care, precautions, weight-bearing education, family education, transfer training, discharge recommendations  Learning Assessment:  patient verbalizes understanding, would benefit from continued reinforcement    Assessment  Activity Tolerance:poor d/t pain and anxiety/fear  Impairments Requiring Therapeutic Intervention: decreased functional mobility, decreased ADL status, decreased safety awareness, decreased cognition, decreased endurance, decreased balance, increased pain, decreased posture  Prognosis: fair  Clinical Assessment: Pt is not at baseline level of function as she is dependent for dressing, bathing, and toileting. Pt requires SBA for grooming tasks. Pt requires assist of 2 people for functional transfers. Pt is nauseas, c/o pain, and verbalizes fear of falling frequently. Pt will benefit from skilled OT to maximize safety and occupational performance as well as reduce caregiver burden. Safety Interventions: patient left in chair, chair alarm in place, call light within reach, gait belt, patient at risk for falls, nurse notified, and family/caregiver present    Plan  Frequency: d/c to SNF for continued therapy   Current Treatment Recommendations: strengthening, balance training, functional mobility training, transfer training, endurance training, patient/caregiver education, ADL/self-care training, IADL training, home management training, pain management, and safety education    Goals  Patient Goals: Go home   Short Term Goals:  Time Frame: 3 weeks  Patient will complete upper body ADL at minimal assistance   Patient will complete lower body ADL at minimal assistance   Patient will complete toileting at modified independent   Patient will complete self-feeding at Independent   Patient will complete grooming at Independent   Patient will complete functional transfers at modified independent   Patient will complete functional mobility at modified independent    No goals met prior to discharge to from facility.          Therapy Session Time  First Therapy Time:   Individual Group Co-treatment   Time In   930   Time Out   1015   Minutes   45      Second Therapy Time:   Individual Group Co-treatment   Time In   3935   Time Out   1330   Minutes   39 Timed Code Treatment Minutes: 45  + 45     Total Treatment Minutes: 45 + 45        Electronically Signed By:   First session: Radha Thibodeaux [id number]   Second session Taj ROGERS/BENITA [id number], 10/25/2022, 1:46 PM

## 2022-10-25 NOTE — DISCHARGE INSTR - COC
Continuity of Care Form    Patient Name: Bladimir Garcia   :  1934  MRN:  [id number]    Admit date:  10/14/2022  Discharge date:  10/25/22    Code Status Order: Full Code   Advance Directives:     Admitting Physician:  Raquel Stinson MD  PCP: William Piedra MD    Discharging Nurse: [facility] Unit/Room#: LNA-2393/1829-40  Discharging Unit Phone Number: [phone]    Emergency Contact:   Extended Emergency Contact Information  Primary Emergency Contact: Chris Mccormack  Address: [address] [place] [address], [address]  Phone: [phone]  Mobile Phone: [phone]  Relation: Child    Past Surgical History:  Past Surgical History:   Procedure Laterality Date     SECTION      CHOLECYSTECTOMY      SHOULDER SURGERY Left 10/12/2022    LEFT REVERSE TOTAL SHOULDER ARTHROPLASTY WITH TUBEROSITY FIXATION - Deloria Reining; REGIONAL BLOCK performed by Gera Seth MD at [address]       Immunization History:   Immunization History   Administered Date(s) Administered    COVID-19, [address] [place] [address]  border, Primary or Immunocompromised, (age 12y+), IM, 100 mcg/0.5mL 2021, 2021    COVID-19, PFIZER PURPLE top, DILUTE for use, (age 15 y+), 30mcg/0.3mL 2021       Active Problems:  Patient Active Problem List   Diagnosis Code    Closed displaced fracture of surgical neck of humerus, initial encounter S42.213A    Osteoporosis with current pathological fracture, initial encounter M80.00XA    Hyponatremia E87.1    Left supracondylar humerus fracture, sequela S42.412S       Isolation/Infection:   Isolation            Contact          Patient Infection Status       Infection Onset Added Last Indicated Last Indicated 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 10/17/22 10/17/22 Galo Colvin RN                Nurse Assessment:  Last Vital Signs: /70   Pulse 72   Temp 97.5 °F (36.4 °C) (Oral)   Resp 16   Ht 5' 5\" (1.651 m)   Wt 163 lb (73.9 kg)   SpO2 94%   BMI 27.12 kg/m²     Last documented pain score (0-10 scale): Pain Level: 4  Last Weight:   Wt Readings from Last 1 Encounters:   10/14/22 163 lb (73.9 kg)     Mental Status:  oriented, alert, and able to concentrate and follow conversation    IV Access:  - None    Nursing Mobility/ADLs:  Walking   Dependent  Transfer  Dependent  Bathing  Assisted  Dressing  Assisted  Toileting  Dependent  Feeding  Assisted  Med Admin  Assisted  Med Delivery   whole and one at a time    Wound Care Documentation and Therapy:  Wound 10/19/22 Other (Comment) Right calloused area with redness and purple (Active)   Wound Etiology Traumatic 10/24/22 2045   Dressing Status Dry; Intact; Clean 10/24/22 2045   Dressing/Treatment Silicone border;Triad hydro/zinc oxide-based hydrophilic paste 39/45/34 1287   Dressing Change Due 10/20/22 10/21/22 0832   Wound Assessment Pink/red;Purple/maroon; Other (Comment) 10/21/22 0832   Drainage Amount None 10/24/22 2045   Odor None 10/24/22 2045   Charity-wound Assessment Intact;Dry/flaky 10/22/22 2015   Number of days: 5       Incision 10/12/22 Shoulder Left (Active)   Dressing Status Clean;Dry; Intact 10/24/22 2045   Dressing/Treatment Dry dressing 10/24/22 2045   Closure Staples 10/24/22 2045   Margins Other (Comment) 10/21/22 0832   Incision Assessment Other (Comment) 10/21/22 0832   Drainage Amount None 10/24/22 2045   Odor None 10/24/22 2045   Charity-incision Assessment Other (Comment) 10/21/22 0832   Number of days: 12        Elimination:  Continence: Bowel: No  Bladder: No  Urinary Catheter: None   Colostomy/Ileostomy/Ileal Conduit: No       Date of Last BM: 10/21/22    Intake/Output Summary (Last 24 hours) at 10/25/2022 0848  Last data filed at 10/25/2022 0658  Gross per 24 hour   Intake 305 ml   Output 700 ml   Net -395 ml     I/O last 3 completed shifts:   In: 305 [P.O.:305]  Out: 2100 [Urine:2100]    Safety Concerns: History of Falls (last 30 days) and At Risk for Falls    Impairments/Disabilities:      Vision and Hearing    Nutrition Therapy:  Current Nutrition Therapy:   - Oral Diet:  General    Routes of Feeding: Oral  Liquids: Thin Liquids  Daily Fluid Restriction: yes - amount 1000ml/day  Last Modified Barium Swallow with Video (Video Swallowing Test): not done    Treatments at the Time of Hospital Discharge:   Respiratory Treatments: n/a  Oxygen Therapy:  is not on home oxygen therapy. Ventilator:    - No ventilator support    Rehab Therapies: Physical Therapy and Occupational Therapy  Weight Bearing Status/Restrictions: NWB left upper extremity  Other Medical Equipment (for information only, NOT a DME order):  wheelchair and bath bench  Other Treatments:   NWB opersative arm; continue sling -   BID exercise program (10 reps each time):              1) passive ER with stick               2) passive FE using pulley or opposite hand to shoulder level only              3) shoulder shrugs, scapular protraction and retraction exercises              4) continue with elbow, forearm, wrist, finger ROM     Patient's personal belongings (please select all that are sent with patient):  Glasses    RN SIGNATURE:  Electronically signed by Bryan Mccormick RN on 10/25/22 at 1:40 PM EDT    CASE MANAGEMENT/SOCIAL WORK SECTION    Inpatient Status Date: 10/14/2022    Readmission Risk Assessment Score:14%  Readmission Risk              Risk of Unplanned Readmission:  18           Discharging to Facility/ Agency   Name: [facility]  [phone]   Fax: [phone]    / signature: Electronically signed by MALINDA Patel on 10/25/2022 at 1:08 PM     PHYSICIAN SECTION    Prognosis: Fair    Condition at Discharge: Stable    Rehab Potential (if transferring to Rehab): Guarded    Recommended Labs or Other Treatments After Discharge: PT/OT/RN. Monitor sodium at least 2x/week.      Physician Certification: I certify the above information and transfer of Michael Pacheco  is necessary for the continuing treatment of the diagnosis listed and that she requires [facility] for less 30 days.      Update Admission H&P: No change in H&P    PHYSICIAN SIGNATURE:  Electronically signed by Luis Dubose MD on 10/25/22 at 8:49 AM EDT

## 2022-10-25 NOTE — PROGRESS NOTES
[facility]   Phone: [phone]    Physical Therapy    [] Initial Evaluation            [x] Daily Treatment Note         [x] Discharge Summary      Patient: Suni Cesar   : 1934   MRN: [id number]   Date of Service:  10/25/2022  Admitting Diagnosis: Left supracondylar humerus fracture, sequela  Current Admission Summary: 79-year-old female with PMHx of CHF, hypertension, chronic hyponatremia and SIADH presented after sustaining a fall while going to the bathroom. Patient reported pain in her left shoulder and right hip. X-ray revealed a communicated fracture of left humeral neck with humeral shaft displaced. CT head negative for any acute intracranial pathology. Patient taken to surgery by Dr. Gayathri Sargent on 10/12 for a repair of her Left comminuted and displaced proximal humerus fracture with a Left reverse shoulder arthroplasty with tuberosity fixation. Post op, patient started in therapies, and patient is able to tolerate 3 hours of therapy 5 days per week and is medically appropriate for rehab on the Acute Rehabilitation Unit. Patient has regular Medicare insurance. She is agreeable to rehab unit treatment. Patient lives at home with her daughter in a two-level house, but she does have a stair lift system in place. Past Medical History:  has a past medical history of CHF (congestive heart failure) ([address] and Hypertension. Past Surgical History:  has a past surgical history that includes Cholecystectomy;  section; and shoulder surgery (Left, 10/12/2022).   Discharge Recommendations: SNF  DME Required For Discharge: wheelchair, mechanical lift  Precautions/Restrictions: high fall risk  Weight Bearing Restrictions: non weight bearing  [] Right Upper Extremity  [x] Left Upper Extremity [] Right Lower Extremity  [] Left Lower Extremity     Required Braces/Orthotics: LUE sling in abduction   [] Right  [x] Left  Positional Restrictions:no PROM or AROM L shoulder movement, okay for AROM elbow/forearm, wrist and hand    Lives With: daughter               Type of Home: house  Home Layout: two level, 1/2 bath on main level, bedroom/bathroom upstairs, chair lift to 2nd level  Home Access:  2 step to enter without rails , has portable ramp   Bathroom Layout: tub/shower unit, walk in shower, uses WIS  Bathroom Equipment: grab bars in shower, shower chair, toilet raiser  Toilet Height: standard height  Home Equipment: rolling walker, manual wheelchair, reacher, oxygen only at night; purewick at nights  Transfer Assistance: Independent without use of device  Ambulation Assistance:modified independent with use of FWW in house, w/c in community  ADL Assistance: independent with all ADL's  IADL Assistance: requires assistance with all homemaking tasks, requires assistance for medication management  Active :        [] Yes                 [x] No daughter provides transportation  Hand Dominance: [] Left                 [x] Right  Hobbies: enjoys reading the paper, watching TV  Recent Falls: 1 recent fall in last 6 months prior to this fall that lead to this admission           Subjective  General: Patient is in semi-fowlers in bed upon PT/OT arrival, daughter present  Pain: 5/10.   Location: L shoulder  Pain Interventions: pain medication in place prior to arrival       Functional Mobility  Bed Mobility  Supine to Sit: maximum assistance  Sit to Supine: 2 person assistance with maxA of 2   Rolling Left: maximum assistance  Rolling Right: maximum assistance  Scootin person assistance with max A x 2   Bridging: minimal assistance  Comments:  Transfers  Sit to stand transfer: stedy utilized requiring maxA of 2 and Jeramie of 1   Stand to sit transfer: stedy utilized requiring maxA of 2   Stand pivot transfer: stedy utilized requiring maxA of 2    Bed to chair transfer: stedy utilized requiring maxA of 2 and Jeramie of 1   Comments:  Ambulation  Ambulation not tested on this date secondary to patient requires maximal assistance to stand. Distance:   Gait Mechanics:   Comments:    Stair Mobility  Stair mobility not completed on this date. Comments:  Wheelchair Mobility:  No w/c mobility completed on this date. Comments:  Balance  Static Sitting Balance: fair (+): maintains balance at SBA/supervision without use of UE support  Dynamic Sitting Balance: fair (+): maintains balance at SBA/supervision without use of UE support  Static Standing Balance: poor (-): requires max (A) to maintain balance  Dynamic Standing Balance: poor (-): requires max (A) to maintain balance  Comments:        Other Therapeutic Interventions    1st session:  Assisted with dressing EOB. Pt required SBA-CGA for balance. Performed standing in stedy with maxA of 2 for LB dressing and transfer to recliner. Pt transferred to recliner with alarm on and all needs in reach. 2nd session:  Pt in recliner on arrival.  Performed seated LAQ X 10, marching X 10, heel raises X 10, toe raises X 10, hip abduction X 10, hip adduction X 10, hip IR/ER X 10. Pt performed UE exercises with OT. Pt returned to bed via maxi elvis. Performed rolling and bridging for pericare and LB dressing. Pt remained in bed with alarm on and all needs in reach.      Functional Outcomes                 Cognition  Overall Cognitive Status: Impaired  Arousal/Alterness: appropriate responses to stimuli  Following Commands: follows one step commands with repetition, follows one step commands with increased time  Safety Judgement: decreased awareness of need for assistance  Initiation: requires cues for some  Orientation:    alert and oriented x 4  Command Following:   impaired--inconsistent with participation and command following    Education  Barriers To Learning: cognition and hearing  Patient Education: patient educated on goals, PT role and benefits, plan of care, weight-bearing education, general safety, functional mobility training, transfer training, discharge recommendations  Learning Assessment:  patient verbalizes understanding, would benefit from continued reinforcement    Assessment  Activity Tolerance: limited due to increased pain with any mobility  Impairments Requiring Therapeutic Intervention: decreased functional mobility, decreased strength, decreased safety awareness, decreased cognition, decreased endurance, decreased balance  Prognosis: guarded  Clinical Assessment: Pt remains limited by nausea and decreased awareness into deficits and benefits of therapy, despite encouragement and education. Pt continues to require significant assistance of 2 people or the use of lift equipment for all functional mobility. Pt will continue to benefit from skilled PT services to address deficits and promote safety in the home environment.    Safety Interventions: patient left in chair, chair alarm in place, call light within reach, and nurse notified    Plan  Frequency: 5 x/week, 90 min/day  Current Treatment Recommendations: strengthening, ROM, balance training, functional mobility training, transfer training, gait training, stair training, endurance training, patient/caregiver education, safety education, and equipment evaluation/education    Goals  Patient Goals: to go home and be independent    Short Term Goals:  Time Frame: 2 weeks   Patient will complete supine<>sit transfer at moderate assistance - not met  Patient will complete lateral transfer at moderate assistance - not met  Patient will complete manual w/c propulsion 10 ft at moderate assistance - not met    To be met in 3 weeks:  Patient will complete supine<>sit transfer at min A I - not met  Patient will complete stand step transfer at min A - not met  Patient will complete manual w/c propulsion 100 ft at min A - not met  Patient will tolerate ambulation x 10' with use of LRAD with mod A - not met    Therapy Session Time      Individual Group Co-treatment   Time In      930 Time Out      1015   Minutes      45       Second Session Therapy Time:   Individual Concurrent Group Co-treatment   Time In        9453   Time Out        1330   Minutes        45           Timed Code Treatment Minutes:  91+57      Total Treatment Minutes:  90      Electronically Signed By: Lord Maynor PT

## 2022-10-25 NOTE — PROGRESS NOTES
[facility] Discharge Assessment    Transportation  \"Has lack of transportation kept you from medical appointments, meetings, work, or from getting things needed for daily living? \"Check all that apply:  [] A.  Yes, it has kept me from medical appointments or from getting my medications  [] B.  Yes, it has kept me from non-medical meetings, appointments, work, or from getting things that I need  [x] C.  No  [] X. Patient unable to respond  [] Y. Patient declines to respond    Provision of Current Reconciled Medication List to Subsequent Provider at Discharge  [] No, current reconciled medication list not provided to the subsequent provider. [x] Yes, current reconciled medication list provided to the subsequent provider. (**Select route of transmission below**)   [x] Via Electronic Health Record   [] [employer]  [] Verbal (e.g. in person, telephone, video conferencing)  [x] Paper-based (e.g. fax, copies, printouts)   [] Other Methods (e.g. texting, email, CDs)    Provision of Current Reconciled Medication List to Patient at Discharge  [] No, current reconciled medication list not provided to the patient, family and/or caregiver. [x] Yes, current reconciled medication list provided to the patient, family and/or caregiver.  (**Select route of transmission below**)   [] Via Electronic Health Record (e.g., electronic access to patient portal)   [] Via Health Information Exchange Organization  [x] Verbal (e.g. in person, telephone, video conferencing)  [x] Paper-based (e.g. fax, copies, printouts)   [] Other Methods (e.g. texting, email, CDs)    Health Literacy  \"How often do you need to have someone help you when you read instructions, pamphlets, or other written material from your doctor or pharmacy? \"  []  0. Never  []  1. Rarely  [x]  2. Sometimes  []  3. Often  []  4. Always  []  8.   Patient unable to respond    BIMS - **Must be completed in the flowsheet at discharge prior to proceeding with Delirium Assessment**  [x] BIMS completed in flowsheet at discharge    Signs and Symptoms of Delirium  A. Acute Onset Mental Status Change - Is there evidence of an acute change in mental status from the patient's baseline? [x] 0. No  [] 1. Yes    B. Inattention - Did the patient have difficulty focusing attention, for example being easily distractible or having difficulty keeping track of what was being said? [x]  0. Behavior not present  []  1. Behavior continuously present, does not fluctuate  []  2. Behavior present, fluctuates (comes and goes, changes in severity)    C. Disorganized thinking - Was the patient's thinking disorganized or incoherent (rambling or irrelevant conversation, unclear or illogical flow of ideas, or unpredictable switching from subject to subject)? [x]  0. Behavior not present  []  1. Behavior continuously present, does not fluctuate  []  2. Behavior present, fluctuates (comes and goes, changes in severity)    D. Altered level of consciousness - Did the patient have altered level of consciousness as indicated by any of the following criteria? Vigilant - startled easily to any sound or touch  Lethargic - repeatedly dozed off while being asked questions, but responded to voice or touch  Stuporous - very difficulty to arouse and keep aroused for the interview  Comatose - could not be aroused  [x]  0. Behavior not present  []  1. Behavior continuously present, does not fluctuate  []  2. Behavior present, fluctuates (comes and goes, changes in severity)    Mood    \"Over the last 2 weeks, have you been bothered by any of the following problems?\" 1. Symptom Presence    0 = No  1 = Yes  9 = No Response 2.  Symptom Frequency    0 = Never or 1 day  1 = 2-6 days (several days)  2 = 7-11 days (half or more of the days)  3 = 12-14 days (nearly every day)  **Leave blank if 'No Reponse'**      Enter scores in boxes    Column 1 Column 2   Little interest or pleasure in doing things   0 0   Feeling down, depressed, or hopeless   0 0   **If either A or B in column 2 is coded 2 or 3, CONTINUE asking the questions below. If not, END the interview. **     Trouble falling or staying asleep, or sleeping too much       Feeling tired or having little energy       Poor appetite or overeating       Feeling bad about yourself - or that you are a failure or have let yourself or your family down       Trouble concentrating on things, such as reading the newspaper or watching television       Moving or speaking so slowly that other people could have noticed. Or the opposite- being so fidgety or restless that you have been moving around a lot more than usual.       Thoughts that you would be better off dead, or of hurting yourself in some way. Total Severity: Add scores for all frequency responses in column 2 (possible score 0-27, or enter 99 if unable to complete (if symptom frequency (column 2) is blank for 3 or more items). 0     Social Isolation  \"How often do you feel lonely or isolated from those around you? \"  [x] 0. Never  [] 1. Rarely  [] 2. Sometimes  [] 3. Often  [] 4. Always  [] 7. Patient declines to respond  [] 8. Patient unable to respond    Pain Effect on Sleep  \"Over the past 5 days, how much of the time has pain made it hard for you to sleep at night? \"  []  0. Does not apply - I have not had any pain or hurting in the past 5 days  []  1. Rarely or not at all  [x]  2. Occasionally  []  3. Frequently  []  4. Almost constantly  []  8. Unable to answer    **If the patient answers \"0. Does not apply\" to this question, skip the next two \"Pain Effect. Paramjit Manjula Paramjit Manjula \" questions**    Pain Interference with Therapy Activities  \"Over the past 5 days, how often have you limited your participation in rehabilitation therapy sessions due to pain? \"  []  0. Does not apply - I have not received rehabilitation therapy in the past 5 days  []  1. Rarely or not at all  []  2. Occasionally  [x]  3. Frequently  []  4. Almost constantly  []  8. Unable to answer    Pain Interference with Day-to-Day Activities: \"Over the past 5 days, how often have you limited your day-to-day activities (excluding rehabilitation therapy session)? \"  []  1. Rarely or not at all  []  2. Occasionally  [x]  3. Frequently  []  4. Almost constantly  []  8. Unable to answer    Nutritional Approaches  Last 7 Days - Check all of the following nutritional approaches that were received within the last 7 days:  []  A. Parenteral/IV feeding  []  B. Feeding tube (e.g., nasogastric or abdominal (PEG))  []  C. Mechanically altered diet - requires change in texture of food or liquids (e.g., pureed food, thickened liquids)  []  D. Therapeutic diet (e.g., low salt, diabetic, low cholesterol)  [x]  Z. None of the above    At time of Discharge - Check all of the following nutritional approaches that were being received at discharge:  []  A. Parenteral/IV feeding (including IV fluids if needed for hydration, but not as part of dialysis/chemo)  []  B. Feeding tube (e.g., nasogastric or abdominal (PEG))  []  C. Mechanically altered diet - requires change in texture of food or liquids (e.g., pureed food, thickened liquids)  []  D. Therapeutic diet (e.g., low salt, diabetic, low cholesterol  [x]  Z. None of the above    High Risk Drug Classes:  Use and Indication    Is taking: Check if the pt is taking any medications by pharmacological classification, not how it is used, in the following classes  Indication noted:  If column 1 is checked, check if there is an indication noted for all meds in the drug class Is taking  (check all that apply) Indication noted (check all that apply)   Antipsychotic [] []   Anticoagulant [x] [x]   Antibiotic [] []   Opioid [x] [x]   Antiplatelet [] []   Hypoglycemic (including insulin) [] []   None of the above []     Special Treatments, Procedures, and Programs    Check all of the following treatments, procedures, and programs that apply at discharge. At Discharge (check all that apply)   Cancer Treatments   A1. Chemotherapy []           A2. IV []           A3. Oral []           A10. Other []   B1. Radiation []   Respiratory Therapies   C1. Oxygen Therapy []           C2. Continuous (continuously for at least 14 hours per day) []           C3. Intermittent []           C4. High-concentration []   D1. Suctioning (Does not include oral suctioning) []           D2. Scheduled []           D3. As needed []   E1. Tracheostomy Care []   F1. Invasive Mechanical Ventilator (ventilator or respirator) []   G1. Non-invasive Mechanical Ventilator []           G2. BiPAP []           G3. CPAP []   Other   H1. IV Medications (Do not include sub Q pumps, flushes, Dextrose 50% or lactated ringers) []           H2. Vasoactive medications []           H3. Antibiotics []           H4. Anticoagulation []           H10. Other []   I1. Transfusions []   J1. Dialysis []           J2. Hemodialysis []           J3. Peritoneal dialysis []   O1. IV access (including a catheter in a vein) []           O2. Peripheral []           O3. Midline []           O4.  Central (PICC, tunneled, port) []      None of the above (select if no Cancer, Respiratory, or Other boxes are checked) [x]

## 2022-12-15 ENCOUNTER — OFFICE VISIT (OUTPATIENT)
Dept: ORTHOPEDIC SURGERY | Age: 87
End: 2022-12-15

## 2022-12-15 VITALS — HEIGHT: 65 IN | BODY MASS INDEX: 27.16 KG/M2 | RESPIRATION RATE: 16 BRPM | WEIGHT: 163 LBS

## 2022-12-15 DIAGNOSIS — S42.292D OTHER CLOSED DISPLACED FRACTURE OF PROXIMAL END OF LEFT HUMERUS WITH ROUTINE HEALING, SUBSEQUENT ENCOUNTER: ICD-10-CM

## 2022-12-15 DIAGNOSIS — Z96.612 STATUS POST REVERSE TOTAL REPLACEMENT OF LEFT SHOULDER: Primary | ICD-10-CM

## 2022-12-15 NOTE — PATIENT INSTRUCTIONS
Home Health therapy may now work on full PROM/AAROM/AROM of the left shoulder. Discontinue sling. She may progress with AROM as tolerated.

## 2022-12-15 NOTE — PROGRESS NOTES
ORTHOPAEDIC PROGRESS NOTE    Chief Complaint   Patient presents with    Post-Op Check     Left shoulder        HPI  12/15/22  Postop check left shoulder  She is 2 months postop from reverse shoulder arthroplasty for fracture  She was initially discharged to rehab, she is now at home  She reports no major issues with the left shoulder    10/12/22  OPERATION PERFORMED:  Left reverse shoulder arthroplasty with tuberosity fixation. Past Medical History:   Diagnosis Date    CHF (congestive heart failure) ([address]     Hypertension        Past Surgical History:   Procedure Laterality Date     SECTION      CHOLECYSTECTOMY      SHOULDER SURGERY Left 10/12/2022    LEFT REVERSE TOTAL SHOULDER ARTHROPLASTY WITH TUBEROSITY FIXATION - [place]; REGIONAL BLOCK performed by Chanda Li MD at [facility] OR       Allergies   Allergen Reactions    Azithromycin Hives and Other (See Comments)    Hydrocodone-Acetaminophen Diarrhea, Nausea Only, Other (See Comments) and Nausea And Vomiting    Strawberry (Diagnostic) Hives    Adhesive Tape Other (See Comments)    Strawberry Other (See Comments)    Tramadol Nausea Only and Other (See Comments)     \"Turned patient into a zombie\"  Turned pt into a zombie         Current Outpatient Medications   Medication Instructions    aspirin 81 mg, Oral, DAILY    b complex vitamins capsule 1 capsule, Oral, DAILY    docusate sodium (COLACE) 100 mg, Oral, 2 TIMES DAILY    furosemide (LASIX) 10 mg, Oral, DAILY    loratadine (CLARITIN) 10 mg, Oral, DAILY    magnesium oxide (MAG-OX) 400 mg, Oral, 2 TIMES DAILY, Take it with meal    NIFEdipine (ADALAT CC) 30 mg, Oral, DAILY    sodium chloride 1 g, Oral, 3 TIMES DAILY WITH MEALS    urea (URE-NA) 30 g, Oral, DAILY       Vitals:    12/15/22 1452   Resp: 16   Weight: 163 lb (73.9 kg)   Height: 5' 5\" (1.651 m)       Physical Exam:  Body mass index is 27.12 kg/m².   NAD, pleasant  LUE SILT M/U/R/A/LABC nerve distributions; AIN/PIN/IO intact  Rad pulse intact  Left shoulder -deltopectoral incision is nicely healed  No erythema or drainage  No swelling or ecchymosis  Passive forward elevation approximately 80  External rotation 10  No external rotation lag sign  She can reach her mouth with her left hand without Hornblower sign      Imaging:  Images were personally reviewed by myself and discussed with the patient  Left shoulder 4 views performed 12/15/22 - s/p reverse shoulder arthroplasty with cemented humeral stem. The implants are in stable position. The tuberosity fracture fragments are appropriately positioned. Assessment & Plan:  80 y.o. female following up for   Diagnosis Orders   1. Status post reverse total replacement of left shoulder        2. Other closed displaced fracture of proximal end of left humerus with routine healing, subsequent encounter  XR SHOULDER LEFT (MIN 2 VIEWS)          No orders of the defined types were placed in this encounter.       2 months postop  Discontinue sling  Okay for full Passive ROM, full Active Assist ROM, and full Active ROM now  Progress as tolerated  Continue formal PT - advance as tolerated, including full ROM and gradual introduction of band/strengthening work    FU in 3-4 months with repeat Chiquis Ledbetter MD

## 2023-04-20 ENCOUNTER — OFFICE VISIT (OUTPATIENT)
Dept: ORTHOPEDIC SURGERY | Age: 88
End: 2023-04-20

## 2023-04-20 VITALS — BODY MASS INDEX: 27.16 KG/M2 | WEIGHT: 163 LBS | HEIGHT: 65 IN | RESPIRATION RATE: 16 BRPM

## 2023-04-20 DIAGNOSIS — Z96.612 S/P REVERSE TOTAL SHOULDER ARTHROPLASTY, LEFT: ICD-10-CM

## 2023-04-20 DIAGNOSIS — S42.292D OTHER CLOSED DISPLACED FRACTURE OF PROXIMAL END OF LEFT HUMERUS WITH ROUTINE HEALING, SUBSEQUENT ENCOUNTER: Primary | ICD-10-CM

## 2023-04-20 NOTE — PROGRESS NOTES
Resp: 16   Weight: 163 lb (73.9 kg)   Height: 5' 5\" (1.651 m)       Physical Exam:  Body mass index is 27.12 kg/m². NAD, pleasant  LUE SILT M/U/R/A/LABC nerve distributions; AIN/PIN/IO intact  Rad pulse intact  Left shoulder -deltopectoral incision is nicely healed  No erythema or drainage  No swelling or ecchymosis  Active forward elevation 85, no further passive  External rotation 10  No external rotation lag sign  She can reach her mouth with her left hand without Hornblower sign      Imaging:  Images were personally reviewed by myself and discussed with the patient  Left shoulder 4 views performed 4/20/23 - s/p reverse total shoulder arthroplasty with cemented humeral stem. The implants are in stable position. Glenohumeral joint is reduced. No hardware complications. Tuberosity fracture fragments are appropriately positioned and healed. Assessment & Plan:  80 y.o. female following up for   Diagnosis Orders   1. Other closed displaced fracture of proximal end of left humerus with routine healing, subsequent encounter  XR SHOULDER LEFT (MIN 2 VIEWS)      2. S/P reverse total shoulder arthroplasty, left  XR SHOULDER LEFT (MIN 2 VIEWS)          No orders of the defined types were placed in this encounter. 1. X-rays were reviewed. 2. WBAT LUE. Full return to normal activities as tolerated. Pt is aware they should avoid heavy lifting above their head indefinitely. 3. Cont HEP as needed. 4. Cont rest, ice, elevation, and NSAIDs/Tylenol as needed for pain and/or swelling. 5. Follow-up in 6 months for repeat x-rays. Patient voiced understanding and was agreeable with this plan. Advised the patient to call our office if any concerns in the future.     Nereyda Gardiner PA-C  04/20/23  3:20 PM

## 2024-12-10 ENCOUNTER — HOSPITAL ENCOUNTER (INPATIENT)
Age: 88
LOS: 2 days | Discharge: HOME | DRG: 189 | End: 2024-12-12
Attending: STUDENT IN AN ORGANIZED HEALTH CARE EDUCATION/TRAINING PROGRAM | Admitting: STUDENT IN AN ORGANIZED HEALTH CARE EDUCATION/TRAINING PROGRAM
Payer: MEDICARE

## 2024-12-10 ENCOUNTER — APPOINTMENT (OUTPATIENT)
Dept: CT IMAGING | Age: 88
DRG: 189 | End: 2024-12-10
Payer: MEDICARE

## 2024-12-10 ENCOUNTER — APPOINTMENT (OUTPATIENT)
Dept: GENERAL RADIOLOGY | Age: 88
DRG: 189 | End: 2024-12-10
Payer: MEDICARE

## 2024-12-10 DIAGNOSIS — J96.92 RESPIRATORY FAILURE WITH HYPERCAPNIA, UNSPECIFIED CHRONICITY: ICD-10-CM

## 2024-12-10 DIAGNOSIS — J21.9 ACUTE BRONCHIOLITIS DUE TO UNSPECIFIED ORGANISM: ICD-10-CM

## 2024-12-10 DIAGNOSIS — T17.500A MUCUS PLUGGING OF BRONCHI: ICD-10-CM

## 2024-12-10 DIAGNOSIS — I44.1 2ND DEGREE ATRIOVENTRICULAR BLOCK: ICD-10-CM

## 2024-12-10 DIAGNOSIS — R06.03 RESPIRATORY DISTRESS: Primary | ICD-10-CM

## 2024-12-10 PROBLEM — J96.02 ACUTE RESPIRATORY FAILURE WITH HYPERCAPNIA: Status: ACTIVE | Noted: 2024-12-10

## 2024-12-10 LAB
ANION GAP SERPL CALCULATED.3IONS-SCNC: 11 MMOL/L (ref 3–16)
BASE EXCESS BLDV CALC-SCNC: 4.9 MMOL/L (ref -3–3)
BASE EXCESS BLDV CALC-SCNC: 5.1 MMOL/L (ref -3–3)
BASOPHILS # BLD: 0 K/UL (ref 0–0.2)
BASOPHILS NFR BLD: 0.5 %
BILIRUB UR QL STRIP.AUTO: NEGATIVE
BUN SERPL-MCNC: 24 MG/DL (ref 7–20)
CALCIUM SERPL-MCNC: 9.8 MG/DL (ref 8.3–10.6)
CHLORIDE SERPL-SCNC: 94 MMOL/L (ref 99–110)
CLARITY UR: CLEAR
CO2 BLDV-SCNC: 70 MMOL/L
CO2 BLDV-SCNC: 82 MMOL/L
CO2 SERPL-SCNC: 30 MMOL/L (ref 21–32)
COHGB MFR BLDV: 1.4 % (ref 0–1.5)
COHGB MFR BLDV: 2.8 % (ref 0–1.5)
COLOR UR: YELLOW
CREAT SERPL-MCNC: 0.7 MG/DL (ref 0.6–1.2)
CRP SERPL-MCNC: 54.6 MG/L (ref 0–5.1)
DEPRECATED RDW RBC AUTO: 15.2 % (ref 12.4–15.4)
DO-HGB MFR BLDV: 38 %
EOSINOPHIL # BLD: 0.1 K/UL (ref 0–0.6)
EOSINOPHIL NFR BLD: 1.5 %
GFR SERPLBLD CREATININE-BSD FMLA CKD-EPI: 82 ML/MIN/{1.73_M2}
GLUCOSE SERPL-MCNC: 110 MG/DL (ref 70–99)
GLUCOSE UR STRIP.AUTO-MCNC: NEGATIVE MG/DL
HCO3 BLDV-SCNC: 29.8 MMOL/L (ref 23–29)
HCO3 BLDV-SCNC: 34.4 MMOL/L (ref 23–29)
HCT VFR BLD AUTO: 44.8 % (ref 36–48)
HGB BLD-MCNC: 15.1 G/DL (ref 12–16)
HGB UR QL STRIP.AUTO: NEGATIVE
KETONES UR STRIP.AUTO-MCNC: NEGATIVE MG/DL
LEUKOCYTE ESTERASE UR QL STRIP.AUTO: NEGATIVE
LYMPHOCYTES # BLD: 1.1 K/UL (ref 1–5.1)
LYMPHOCYTES NFR BLD: 13.3 %
MAGNESIUM SERPL-MCNC: 1.72 MG/DL (ref 1.8–2.4)
MCH RBC QN AUTO: 30.4 PG (ref 26–34)
MCHC RBC AUTO-ENTMCNC: 33.7 G/DL (ref 31–36)
MCV RBC AUTO: 90.3 FL (ref 80–100)
METHGB MFR BLDV: 0.3 %
METHGB MFR BLDV: 0.6 %
MONOCYTES # BLD: 0.2 K/UL (ref 0–1.3)
MONOCYTES NFR BLD: 2.7 %
NEUTROPHILS # BLD: 6.8 K/UL (ref 1.7–7.7)
NEUTROPHILS NFR BLD: 82 %
NITRITE UR QL STRIP.AUTO: NEGATIVE
NT-PROBNP SERPL-MCNC: 198 PG/ML (ref 0–449)
O2 CT VFR BLDV CALC: 14 VOL %
O2 CT VFR BLDV CALC: 19 VOL %
O2 THERAPY: ABNORMAL
O2 THERAPY: ABNORMAL
PCO2 BLDV: 43.2 MMHG (ref 40–50)
PCO2 BLDV: 70.4 MMHG (ref 40–50)
PH BLDV: 7.3 [PH] (ref 7.35–7.45)
PH BLDV: 7.45 [PH] (ref 7.35–7.45)
PH UR STRIP.AUTO: 5.5 [PH] (ref 5–8)
PLATELET # BLD AUTO: 120 K/UL (ref 135–450)
PMV BLD AUTO: 7.1 FL (ref 5–10.5)
PO2 BLDV: 187 MMHG (ref 25–40)
PO2 BLDV: 35 MMHG (ref 25–40)
POTASSIUM SERPL-SCNC: 4 MMOL/L (ref 3.5–5.1)
PROCALCITONIN SERPL IA-MCNC: 0.43 NG/ML (ref 0–0.15)
PROT UR STRIP.AUTO-MCNC: NEGATIVE MG/DL
RBC # BLD AUTO: 4.96 M/UL (ref 4–5.2)
REASON FOR REJECTION: NORMAL
REJECTED TEST: NORMAL
SAO2 % BLDV: 61 %
SAO2 % BLDV: 98 %
SODIUM SERPL-SCNC: 135 MMOL/L (ref 136–145)
SP GR UR STRIP.AUTO: >=1.03 (ref 1–1.03)
TROPONIN, HIGH SENSITIVITY: 21 NG/L (ref 0–14)
TROPONIN, HIGH SENSITIVITY: 25 NG/L (ref 0–14)
UA COMPLETE W REFLEX CULTURE PNL UR: NORMAL
UA DIPSTICK W REFLEX MICRO PNL UR: NORMAL
URN SPEC COLLECT METH UR: NORMAL
UROBILINOGEN UR STRIP-ACNC: 0.2 E.U./DL
WBC # BLD AUTO: 8.2 K/UL (ref 4–11)

## 2024-12-10 PROCEDURE — 85025 COMPLETE CBC W/AUTO DIFF WBC: CPT

## 2024-12-10 PROCEDURE — 6370000000 HC RX 637 (ALT 250 FOR IP): Performed by: STUDENT IN AN ORGANIZED HEALTH CARE EDUCATION/TRAINING PROGRAM

## 2024-12-10 PROCEDURE — 5A09357 ASSISTANCE WITH RESPIRATORY VENTILATION, LESS THAN 24 CONSECUTIVE HOURS, CONTINUOUS POSITIVE AIRWAY PRESSURE: ICD-10-PCS | Performed by: INTERNAL MEDICINE

## 2024-12-10 PROCEDURE — 83735 ASSAY OF MAGNESIUM: CPT

## 2024-12-10 PROCEDURE — 2060000000 HC ICU INTERMEDIATE R&B

## 2024-12-10 PROCEDURE — 84145 PROCALCITONIN (PCT): CPT

## 2024-12-10 PROCEDURE — 94660 CPAP INITIATION&MGMT: CPT

## 2024-12-10 PROCEDURE — 94669 MECHANICAL CHEST WALL OSCILL: CPT

## 2024-12-10 PROCEDURE — 2700000000 HC OXYGEN THERAPY PER DAY

## 2024-12-10 PROCEDURE — 83880 ASSAY OF NATRIURETIC PEPTIDE: CPT

## 2024-12-10 PROCEDURE — 81003 URINALYSIS AUTO W/O SCOPE: CPT

## 2024-12-10 PROCEDURE — 94761 N-INVAS EAR/PLS OXIMETRY MLT: CPT

## 2024-12-10 PROCEDURE — 0202U NFCT DS 22 TRGT SARS-COV-2: CPT

## 2024-12-10 PROCEDURE — 6360000004 HC RX CONTRAST MEDICATION: Performed by: STUDENT IN AN ORGANIZED HEALTH CARE EDUCATION/TRAINING PROGRAM

## 2024-12-10 PROCEDURE — 6360000002 HC RX W HCPCS: Performed by: STUDENT IN AN ORGANIZED HEALTH CARE EDUCATION/TRAINING PROGRAM

## 2024-12-10 PROCEDURE — 99285 EMERGENCY DEPT VISIT HI MDM: CPT

## 2024-12-10 PROCEDURE — 80048 BASIC METABOLIC PNL TOTAL CA: CPT

## 2024-12-10 PROCEDURE — 36415 COLL VENOUS BLD VENIPUNCTURE: CPT

## 2024-12-10 PROCEDURE — 86140 C-REACTIVE PROTEIN: CPT

## 2024-12-10 PROCEDURE — 93005 ELECTROCARDIOGRAM TRACING: CPT | Performed by: STUDENT IN AN ORGANIZED HEALTH CARE EDUCATION/TRAINING PROGRAM

## 2024-12-10 PROCEDURE — 84484 ASSAY OF TROPONIN QUANT: CPT

## 2024-12-10 PROCEDURE — 71045 X-RAY EXAM CHEST 1 VIEW: CPT

## 2024-12-10 PROCEDURE — 71260 CT THORAX DX C+: CPT

## 2024-12-10 PROCEDURE — 82803 BLOOD GASES ANY COMBINATION: CPT

## 2024-12-10 PROCEDURE — 94640 AIRWAY INHALATION TREATMENT: CPT

## 2024-12-10 PROCEDURE — 2580000003 HC RX 258: Performed by: STUDENT IN AN ORGANIZED HEALTH CARE EDUCATION/TRAINING PROGRAM

## 2024-12-10 RX ORDER — MAGNESIUM SULFATE IN WATER 40 MG/ML
2000 INJECTION, SOLUTION INTRAVENOUS ONCE
Status: COMPLETED | OUTPATIENT
Start: 2024-12-10 | End: 2024-12-10

## 2024-12-10 RX ORDER — DIAZEPAM 5 MG/1
5 TABLET ORAL NIGHTLY PRN
COMMUNITY

## 2024-12-10 RX ORDER — DOXYCYCLINE HYCLATE 100 MG
100 TABLET ORAL EVERY 12 HOURS SCHEDULED
Status: DISCONTINUED | OUTPATIENT
Start: 2024-12-10 | End: 2024-12-12 | Stop reason: HOSPADM

## 2024-12-10 RX ORDER — ACETAMINOPHEN 325 MG/1
650 TABLET ORAL EVERY 6 HOURS PRN
Status: DISCONTINUED | OUTPATIENT
Start: 2024-12-10 | End: 2024-12-12 | Stop reason: HOSPADM

## 2024-12-10 RX ORDER — NIFEDIPINE 30 MG/1
30 TABLET, EXTENDED RELEASE ORAL DAILY
COMMUNITY
Start: 2024-12-03

## 2024-12-10 RX ORDER — ENOXAPARIN SODIUM 100 MG/ML
40 INJECTION SUBCUTANEOUS DAILY
Status: DISCONTINUED | OUTPATIENT
Start: 2024-12-10 | End: 2024-12-12 | Stop reason: HOSPADM

## 2024-12-10 RX ORDER — IOPAMIDOL 755 MG/ML
75 INJECTION, SOLUTION INTRAVASCULAR
Status: COMPLETED | OUTPATIENT
Start: 2024-12-10 | End: 2024-12-10

## 2024-12-10 RX ORDER — ACETAMINOPHEN 650 MG/1
650 SUPPOSITORY RECTAL EVERY 6 HOURS PRN
Status: DISCONTINUED | OUTPATIENT
Start: 2024-12-10 | End: 2024-12-12 | Stop reason: HOSPADM

## 2024-12-10 RX ORDER — POTASSIUM CHLORIDE 750 MG/1
1 TABLET, EXTENDED RELEASE ORAL DAILY
COMMUNITY
Start: 2024-12-02

## 2024-12-10 RX ORDER — IPRATROPIUM BROMIDE AND ALBUTEROL SULFATE 2.5; .5 MG/3ML; MG/3ML
1 SOLUTION RESPIRATORY (INHALATION)
Status: DISCONTINUED | OUTPATIENT
Start: 2024-12-10 | End: 2024-12-11

## 2024-12-10 RX ORDER — CALCIUM CARBONATE 500 MG/1
500 TABLET, CHEWABLE ORAL 3 TIMES DAILY PRN
COMMUNITY

## 2024-12-10 RX ADMIN — IPRATROPIUM BROMIDE AND ALBUTEROL SULFATE 1 DOSE: .5; 3 SOLUTION RESPIRATORY (INHALATION) at 15:55

## 2024-12-10 RX ADMIN — DOXYCYCLINE HYCLATE 100 MG: 100 TABLET, COATED ORAL at 21:05

## 2024-12-10 RX ADMIN — METHYLPREDNISOLONE SODIUM SUCCINATE 40 MG: 40 INJECTION INTRAMUSCULAR; INTRAVENOUS at 07:54

## 2024-12-10 RX ADMIN — WATER 1000 MG: 1 INJECTION INTRAMUSCULAR; INTRAVENOUS; SUBCUTANEOUS at 07:54

## 2024-12-10 RX ADMIN — IPRATROPIUM BROMIDE AND ALBUTEROL SULFATE 1 DOSE: .5; 3 SOLUTION RESPIRATORY (INHALATION) at 21:06

## 2024-12-10 RX ADMIN — ACETAMINOPHEN 650 MG: 325 TABLET ORAL at 08:07

## 2024-12-10 RX ADMIN — MAGNESIUM SULFATE HEPTAHYDRATE 2000 MG: 40 INJECTION, SOLUTION INTRAVENOUS at 15:11

## 2024-12-10 RX ADMIN — ACETAMINOPHEN 650 MG: 325 TABLET ORAL at 16:45

## 2024-12-10 RX ADMIN — ENOXAPARIN SODIUM 40 MG: 100 INJECTION SUBCUTANEOUS at 07:53

## 2024-12-10 RX ADMIN — DOXYCYCLINE HYCLATE 100 MG: 100 TABLET, COATED ORAL at 07:53

## 2024-12-10 RX ADMIN — IOPAMIDOL 75 ML: 755 INJECTION, SOLUTION INTRAVENOUS at 05:34

## 2024-12-10 ASSESSMENT — ENCOUNTER SYMPTOMS
SHORTNESS OF BREATH: 1
NAUSEA: 0
VOMITING: 0
CHEST TIGHTNESS: 1
DIARRHEA: 0
ABDOMINAL PAIN: 0

## 2024-12-10 ASSESSMENT — PAIN SCALES - GENERAL
PAINLEVEL_OUTOF10: 4
PAINLEVEL_OUTOF10: 0
PAINLEVEL_OUTOF10: 4
PAINLEVEL_OUTOF10: 5
PAINLEVEL_OUTOF10: 0

## 2024-12-10 ASSESSMENT — LIFESTYLE VARIABLES
HOW OFTEN DO YOU HAVE A DRINK CONTAINING ALCOHOL: NEVER
HOW MANY STANDARD DRINKS CONTAINING ALCOHOL DO YOU HAVE ON A TYPICAL DAY: PATIENT DOES NOT DRINK
HOW MANY STANDARD DRINKS CONTAINING ALCOHOL DO YOU HAVE ON A TYPICAL DAY: PATIENT DOES NOT DRINK
HOW OFTEN DO YOU HAVE A DRINK CONTAINING ALCOHOL: NEVER

## 2024-12-10 ASSESSMENT — PAIN SCALES - WONG BAKER: WONGBAKER_NUMERICALRESPONSE: NO HURT

## 2024-12-10 ASSESSMENT — PAIN DESCRIPTION - DESCRIPTORS: DESCRIPTORS: ACHING

## 2024-12-10 ASSESSMENT — PAIN DESCRIPTION - LOCATION: LOCATION: LEG

## 2024-12-10 ASSESSMENT — PAIN DESCRIPTION - ORIENTATION: ORIENTATION: RIGHT;LEFT

## 2024-12-10 NOTE — PROGRESS NOTES
12/10/24 0434   NIV Type   $NIV $Daily Charge   Ventilator ID [id number]   Suction Setup and Functional Yes   NIV Started/Stopped On   Equipment Type V60   Mode Bilevel   Mask Type Full face mask   Mask Size Medium   Assessment   Pulse 81   Respirations 18   SpO2 98 %   Breath Sounds   Respiratory Pattern Regular   Breath Sounds Bilateral Diminished   Settings/Measurements   PIP Observed 11 cm H20   IPAP 10 cmH20   CPAP/EPAP 5 cmH2O   Vt (Measured) 376 mL   Rate Ordered 15   FiO2  30 %   Minute Volume (L/min) 7.8 Liters   Mask Leak (lpm) 6 lpm   Patient's Home Machine No   Alarm Settings   Alarms On Y   Low Pressure (cmH2O) 3 cmH2O   High Pressure (cmH2O) 30 cmH2O   RR Low (bpm) 16   RR High (bpm) 40 br/min

## 2024-12-10 NOTE — PROGRESS NOTES
..Patient has arrived to unit in stable condition. Vitals stable. Patient is awake, alert and oriented. Respirations are easy and unlabored. Patient does not appear to be in distress. Patient oriented to room and call light. Plan of care discussed with patient, patient agreeable. Call light within reach.

## 2024-12-10 NOTE — H&P
History and Physical      Name:  Jamilah Jaquez /Age/Sex: 1934  (89 y.o. female)   MRN & CSN:  [id number] & [id number] Encounter Date/Time: 12/10/2024 6:35 AM EST   Location:  [facility] PCP: Reid Mike MD       Assessment and Plan:   Jamilah Jaquez is a 89 y.o. female with spinal stenosis, chronic back pain, history of PE and DVT, SIADH, hypertension presented from home with shortness of breath    Acute respiratory failure with hypercapnia  Likely secondary to underlying bronchiectasis, possible right lung pneumonia  CTA chest personally reviewed no acute PE, bronchiectasis and mucous plugging in right upper lobe, middle and lingula.  Groundglass opacities in right upper lobe.  Check respiratory viral panel  Scheduled DuoNebs, IV Solu-Medrol  Acapella and pulmonary toilet  IV Rocephin and doxycycline, follow-up respiratory cultures  Continue BiPAP to target normalization of pH, repeat VBG pending    Elevated troponin, 25>21 secondary to demand ischemia  Chest pain-free EKG personally reviewed normal sinus rhythm 94/min first-degree AV block, QTc 507 ms.    Prolonged QTc  Check magnesium level, avoid QT prolonging agents and monitor on telemetry    History of PE and DVT  Not on anticoagulation at home anymore    Hypertension    Chronic bilateral lower extremity edema    Unable to perform home medication reconciliation, please reevaluate later  Inpatient PCU telemetry  DNRCCA    Disposition:     Current Living situation: Home  Expected Disposition: Home  Estimated D/C: 2 days    Diet Diet NPO Exceptions are: Sips of Water with Meds   DVT Prophylaxis [x] Lovenox, []  Heparin, [] SCDs, [] Ambulation,  [] Eliquis, [] Xarelto, [] Coumadin   Code Status DNR-CCA   Surrogate Decision Maker/ ALONDRA England     Personally reviewed Lab Studies and Imaging   Discussed management of the case with ER provider who recommended admission due to acute respiratory failure    History from:     Patient and  daughter    History of Present Illness:     Chief Complaint   Patient presents with    Shortness of Breath     Pt presents via [facility] w/ cc of SOB. Received 1 breathing treatment in route.      Jamilah Jaquez is a 89 y.o. female with spinal stenosis, chronic back pain, history of PE and DVT, SIADH, hypertension presented from home with shortness of breath.  During my evaluation in ER patient was alert oriented x 3 hemodynamically stable on a BiPAP with mild respiratory distress hence most of the history was obtained from daughter at bedside.  Patient resides with daughter, she tells me that last evening patient was having a hard time sleeping she also noticed that patient was wheezing checked her oxygen saturation which was 87%, later during the night she had similar complaint and noted that her SpO2 was 81% hence she called EMS and patient was transferred to [facility] ER.  Denies any sick contact, fever night sweats chills LOC dizziness nausea vomiting or diarrhea.  Patient denies any chest pain.     Review of Systems:      Pertinent positives and negatives discussed in HPI     Objective:   No intake or output data in the 24 hours ending 12/10/24 0656   Vitals:   Vitals:    12/10/24 0530 12/10/24 0545 12/10/24 0617 12/10/24 0632   BP: 115/66 (!) 128/57 (!) 123/59 129/66   Pulse: 84 85 81 81   Resp: 25 16 17 17   Temp:       SpO2: 100% 98% 98% 100%   Weight:       Height:           Personally Reviewed Medications Prior to Admission     Prior to Admission medications    Medication Sig Start Date End Date Taking? Authorizing Provider   urea (URE-NA) 15 g PACK packet Take 30 g by mouth daily 10/25/22   Elias Alex MD   furosemide (LASIX) 20 MG tablet Take 0.5 tablets by mouth daily 10/25/22   Elias Alex MD   sodium chloride 1 g tablet Take 1 tablet by mouth 3 times daily (with meals) for 14 days 10/14/22 10/28/22  Vicky Small MD   loratadine (CLARITIN) 10 MG tablet Take 10 mg by mouth daily

## 2024-12-10 NOTE — ED NOTES
Patient Name: Jamilah Jaquez  : 1934 89 y.o.  MRN: [id number]  ED Room #: ED-0012/12     Chief complaint:   Chief Complaint   Patient presents with    Shortness of Breath     Pt presents via [facility] w/ cc of SOB. Received 1 breathing treatment in route.      Hospital Problem/Diagnosis:   Hospital Problems             Last Modified POA    * (Principal) Acute respiratory failure with hypercapnia 12/10/2024 Yes         O2 Flow Rate:O2 Device: PAP (positive airway pressure)   (if applicable)  Cardiac Rhythm:   (if applicable)  Active LDA's:   Peripheral IV 12/10/24 Proximal;Right;Medial;Anterior Forearm (Active)   Site Assessment Clean, dry & intact 12/10/24 0404   Line Status Blood return noted;Specimen collected;Flushed;Normal saline locked 12/10/24 0404   Phlebitis Assessment No symptoms 12/10/24 0404   Infiltration Assessment 0 12/10/24 0404   Alcohol Cap Used Yes 12/10/24 0404   Dressing Status New dressing applied;Clean, dry & intact 12/10/24 0404   Dressing Type Transparent 12/10/24 0404            How does patient ambulate? Unknown, did not assess in the Emergency Department    2. How does patient take pills? Whole with Water    3. Is patient alert? Alert    4. Is patient oriented? To Person, To Place, To Time, and To Situation    5.   Patient arrived from:  home  Facility Name: ___________________________________________    6. If patient is disoriented or from a Skill Nursing Facility has family been notified of admission? No    7. Patient belongings? Belongings: Clothing    Disposition of belongings? Kept with Patient     8. Any specific patient or family belongings/needs/dynamics?   a. N/A    9. Miscellaneous comments/pending orders?  a. ED orders complete.       If there are any additional questions please reach out to the Emergency Department.

## 2024-12-10 NOTE — ED PROVIDER NOTES
[address]  EMERGENCY DEPARTMENT ENCOUNTER        Pt Name: Jamilah Jaquez  MRN: [id number]  Birthdate 1934  Date of evaluation: 12/10/2024  Provider: ADELINA Enriquez - TAYLOR  PCP: Reid Mike MD  Note Started: 5:42 AM EST 12/10/24       I have seen and evaluated this patient with my supervising physician Nguyễn Humphrey DO.      CHIEF COMPLAINT       Chief Complaint   Patient presents with    Shortness of Breath     Pt presents via [facility] w/ cc of SOB. Received 1 breathing treatment in route.        HISTORY OF PRESENT ILLNESS: 1 or more Elements     History from : Patient and EMS    Limitations to history : None    Jamilah Jaquez is a 89 y.o. female who presents to the emergency department for evaluation of dyspnea that started about 2 hours prior to arrival.  Oxygen saturation in the mid 80s upon arrival, DuoNebs given and route.  Patient reports right greater than left lower extremity swelling.    Nursing Notes were all reviewed and agreed with or any disagreements were addressed in the HPI.    REVIEW OF SYSTEMS :      Review of Systems   Constitutional:  Negative for activity change, chills and fever.   Respiratory:  Positive for chest tightness and shortness of breath.    Cardiovascular:  Positive for leg swelling. Negative for chest pain.   Gastrointestinal:  Negative for abdominal pain, diarrhea, nausea and vomiting.   Genitourinary:  Negative for dysuria.   All other systems reviewed and are negative.      Positives and Pertinent negatives as per HPI.     SURGICAL HISTORY     Past Surgical History:   Procedure Laterality Date     SECTION      x3    CHOLECYSTECTOMY      SHOULDER SURGERY Left 10/12/2022    LEFT REVERSE TOTAL SHOULDER ARTHROPLASTY WITH TUBEROSITY FIXATION - TORNIER; REGIONAL BLOCK performed by Jose Clark MD at [facility] OR       [facility]       Current Discharge Medication List        CONTINUE these medications which    middle lobe, and lingula. Few clustered ground-glass opacities compatible   with bronchiolitis in the right upper lobe.         XR CHEST PORTABLE   Final Result   Chronic findings in the chest without acute airspace disease identified.           XR CHEST PORTABLE    Result Date: 12/10/2024  EXAMINATION: ONE XRAY VIEW OF THE CHEST 12/10/2024 4:41 am COMPARISON: 12/08/2005 HISTORY: ORDERING SYSTEM PROVIDED HISTORY: CHF exacerbation TECHNOLOGIST PROVIDED HISTORY: Reason for exam:->CHF exacerbation Reason for Exam: CHF exacerbation/SOB FINDINGS: The cardiomediastinal silhouette is unchanged in appearance. Generalized interstitial prominence is noted.  There is no consolidation, pneumothorax, or evidence of edema. No effusion is appreciated.  Status post left total reverse shoulder arthroplasty.     Chronic findings in the chest without acute airspace disease identified.       No results found.    PROCEDURES   Unless otherwise noted below, none     Procedures    CRITICAL CARE TIME (.cctime)   There was a high probability of life-threatening clinical deterioration in the patient's condition requiring my urgent intervention.  I personally saw the patient and independently provided 32 minutes of non-concurrent critical care out of the total shared critical care time provided, excluding separately reportable procedures.         PAST MEDICAL HISTORY      has a past medical history of CHF (congestive heart failure) (HCC) and Hypertension.     Chronic Conditions affecting Care: chf    EMERGENCY DEPARTMENT COURSE and DIFFERENTIAL DIAGNOSIS/MDM:   Vitals:    Vitals:    12/10/24 0700 12/10/24 1230 12/10/24 1555 12/10/24 1645   BP: 116/75 (!) 143/67  (!) 144/67   Pulse: 77 67  77   Resp: 20 18  17   Temp: 97.3 °F (36.3 °C)   97.6 °F (36.4 °C)   TempSrc:    Oral   SpO2: 96% 96% 95%    Weight:       Height:           Patient was given the following medications:  Medications   enoxaparin (LOVENOX) injection 40 mg (40 mg SubCUTAneous

## 2024-12-10 NOTE — PROGRESS NOTES
Patient seen in ED, room 12.  Admission completed with the following exceptions:  4 Eyes Assessment, Immunizations, Vaccines, Rights and Responsibilities, Orientation to room, Plan of Care, Education/Learning Assessment and Education Plan, white board, height and weight, pain assessment and head to toe assessment.  Patient is alert and oriented X 4.  Patient lives in a house with her daughter and is being admitted for Acute Respiratory failure with hypercapnia.     Home Medication reconciliation will be/has been completed by Pharmacy Staff.  All patient's and/or family's questions answered.     Patient lives in a two story house with her daughter who helps take care of her. All living on first floor. Patient reports she is not ambulatory and uses a transfer chair.

## 2024-12-10 NOTE — PLAN OF CARE
Problem: ABCDS Injury Assessment  Goal: Absence of physical injury  Outcome: Progressing     Problem: Skin/Tissue Integrity  Goal: Absence of new skin breakdown  Description: 1.  Monitor for areas of redness and/or skin breakdown  2.  Assess vascular access sites hourly  3.  Every 4-6 hours minimum:  Change oxygen saturation probe site  4.  Every 4-6 hours:  If on nasal continuous positive airway pressure, respiratory therapy assess nares and determine need for appliance change or resting period.  Outcome: Progressing     Problem: Chronic Conditions and Co-morbidities  Goal: Patient's chronic conditions and co-morbidity symptoms are monitored and maintained or improved  Outcome: Progressing     Problem: Discharge Planning  Goal: Discharge to home or other facility with appropriate resources  Outcome: Progressing     Problem: Safety - Adult  Goal: Free from fall injury  Outcome: Progressing

## 2024-12-10 NOTE — ED PROVIDER NOTES
EMERGENCY DEPARTMENT PROVIDER NOTE         PATIENT IDENTIFICATION     Name:   Jamilah Jaquez  MRN:   [id number]  YOB: 1934  Date of Evaluation:   12/10/2024  Provider:   Anna Vanegas NP; Nguyễn Humphrey DO  PCP:   Reid Mike MD        CHIEF COMPLAINT       Shortness of Breath (Pt presents via [facility] w/ cc of SOB. Received 1 breathing treatment in route. )        HISTORY OF PRESENT ILLNESS     I independently interviewed patient and/or caretaker(s).  See Advanced Practice Provider (CAMERON) note for full HPI.  In summary, Jamilah Jaquez  is a(n) 89 y.o. female who presents with dyspnea that started about 2 hours prior to arrival with the patient was at rest.  Oxygen saturation mid 80s on room air upon EMS arrival.  Improved after receiving some DuoNebs.  Patient denies any other complaints.  She states that she has right greater than left lower extremity swelling at baseline and that this is unchanged.        PHYSICAL EXAM     I reviewed physical exam performed and documented by CAMERON.  I performed an independent physical examination with findings as follows:  Chronically ill-appearing elderly female with no respiratory distress on room air.  Speaking in short but full sentences.  No accessory muscle use.  Oxygen saturation 100% on monitor.  She does have rales in bilateral lung fields.  No wheezing or rhonchi.  Normal heart sounds with 2+ symmetric pulses in all distal extremities.  Left lower extremity with 1+ edema, right lower extremity 2+ edema with tenderness to palpation.        PROCEDURES     Bedside Ultrasound    Date/Time: 12/10/2024 6:33 AM    Performed by: Nguyễn Humphrey DO  Authorized by: Nguyễn Humphrey DO    Verbal consent obtained: Yes    Given by:  Patient  Performed by:  Attending  Type of procedure:  Focused cardiac (Echo)  Left leg:     Indications:  Shortness of breath    Subxiphoid (4 chamber):  Adequate    Parasternal long axis:  Adequate

## 2024-12-11 ENCOUNTER — APPOINTMENT (OUTPATIENT)
Age: 88
DRG: 189 | End: 2024-12-11
Attending: STUDENT IN AN ORGANIZED HEALTH CARE EDUCATION/TRAINING PROGRAM
Payer: MEDICARE

## 2024-12-11 LAB
ANION GAP SERPL CALCULATED.3IONS-SCNC: 12 MMOL/L (ref 3–16)
BUN SERPL-MCNC: 18 MG/DL (ref 7–20)
CALCIUM SERPL-MCNC: 9.2 MG/DL (ref 8.3–10.6)
CHLORIDE SERPL-SCNC: 94 MMOL/L (ref 99–110)
CO2 SERPL-SCNC: 26 MMOL/L (ref 21–32)
CREAT SERPL-MCNC: 0.6 MG/DL (ref 0.6–1.2)
DEPRECATED RDW RBC AUTO: 15.1 % (ref 12.4–15.4)
ECHO AO ASC DIAM: 3.2 CM
ECHO AO ASCENDING AORTA INDEX: 1.68 CM/M2
ECHO AO ROOT DIAM: 2.5 CM
ECHO AO ROOT INDEX: 1.31 CM/M2
ECHO AV AREA PEAK VELOCITY: 2 CM2
ECHO AV AREA VTI: 2.1 CM2
ECHO AV AREA/BSA PEAK VELOCITY: 1 CM2/M2
ECHO AV AREA/BSA VTI: 1.1 CM2/M2
ECHO AV MEAN GRADIENT: 4 MMHG
ECHO AV MEAN GRADIENT: 4 MMHG
ECHO AV MEAN VELOCITY: 1 M/S
ECHO AV PEAK GRADIENT: 8 MMHG
ECHO AV PEAK VELOCITY: 1.4 M/S
ECHO AV VELOCITY RATIO: 0.79
ECHO AV VTI: 36.6 CM
ECHO BSA: 1.94 M2
ECHO EST RA PRESSURE: 3 MMHG
ECHO LA AREA 2C: 14.3 CM2
ECHO LA AREA 4C: 13.6 CM2
ECHO LA DIAMETER INDEX: 1.78 CM/M2
ECHO LA DIAMETER: 3.4 CM
ECHO LA MAJOR AXIS: 4.7 CM
ECHO LA MINOR AXIS: 5 CM
ECHO LA TO AORTIC ROOT RATIO: 1.36
ECHO LA VOL BP: 33 ML (ref 22–52)
ECHO LA VOL MOD A2C: 34 ML (ref 22–52)
ECHO LA VOL MOD A4C: 31 ML (ref 22–52)
ECHO LA VOL/BSA BIPLANE: 17 ML/M2 (ref 16–34)
ECHO LA VOLUME INDEX MOD A2C: 18 ML/M2 (ref 16–34)
ECHO LA VOLUME INDEX MOD A4C: 16 ML/M2 (ref 16–34)
ECHO LV E' LATERAL VELOCITY: 8.27 CM/S
ECHO LV E' SEPTAL VELOCITY: 8.05 CM/S
ECHO LV EDV A2C: 41 ML
ECHO LV EDV A4C: 54 ML
ECHO LV EDV INDEX A4C: 28 ML/M2
ECHO LV EDV NDEX A2C: 21 ML/M2
ECHO LV EF PHYSICIAN: 63 %
ECHO LV EJECTION FRACTION A2C: 56 %
ECHO LV EJECTION FRACTION A4C: 64 %
ECHO LV ESV A2C: 18 ML
ECHO LV ESV A4C: 19 ML
ECHO LV ESV INDEX A2C: 9 ML/M2
ECHO LV ESV INDEX A4C: 10 ML/M2
ECHO LV FRACTIONAL SHORTENING: 29 % (ref 28–44)
ECHO LV INTERNAL DIMENSION DIASTOLE INDEX: 1.99 CM/M2
ECHO LV INTERNAL DIMENSION DIASTOLIC: 3.8 CM (ref 3.9–5.3)
ECHO LV INTERNAL DIMENSION SYSTOLIC INDEX: 1.41 CM/M2
ECHO LV INTERNAL DIMENSION SYSTOLIC: 2.7 CM
ECHO LV IVSD: 0.7 CM (ref 0.6–0.9)
ECHO LV MASS 2D: 78.8 G (ref 67–162)
ECHO LV MASS INDEX 2D: 41.3 G/M2 (ref 43–95)
ECHO LV POSTERIOR WALL DIASTOLIC: 0.8 CM (ref 0.6–0.9)
ECHO LV RELATIVE WALL THICKNESS RATIO: 0.42
ECHO LVOT AREA: 2.5 CM2
ECHO LVOT AV VTI INDEX: 0.81
ECHO LVOT DIAM: 1.8 CM
ECHO LVOT MEAN GRADIENT: 2 MMHG
ECHO LVOT PEAK GRADIENT: 5 MMHG
ECHO LVOT PEAK VELOCITY: 1.1 M/S
ECHO LVOT STROKE VOLUME INDEX: 39.5 ML/M2
ECHO LVOT SV: 75.5 ML
ECHO LVOT VTI: 29.7 CM
ECHO MV A VELOCITY: 1.47 M/S
ECHO MV AREA VTI: 2.3 CM2
ECHO MV E DECELERATION TIME (DT): 269 MS
ECHO MV E VELOCITY: 0.81 M/S
ECHO MV E/A RATIO: 0.55
ECHO MV E/E' LATERAL: 9.79
ECHO MV E/E' RATIO (AVERAGED): 9.93
ECHO MV E/E' SEPTAL: 10.06
ECHO MV LVOT VTI INDEX: 1.13
ECHO MV MAX VELOCITY: 1.4 M/S
ECHO MV MEAN GRADIENT: 3 MMHG
ECHO MV MEAN VELOCITY: 0.8 M/S
ECHO MV PEAK GRADIENT: 8 MMHG
ECHO MV VTI: 33.5 CM
ECHO PV MAX VELOCITY: 0.8 M/S
ECHO PV PEAK GRADIENT: 2 MMHG
ECHO RA AREA 4C: 14 CM2
ECHO RA END SYSTOLIC VOLUME APICAL 4 CHAMBER INDEX BSA: 19 ML/M2
ECHO RA VOLUME: 36 ML
ECHO RV BASAL DIMENSION: 3.5 CM
ECHO RV FREE WALL PEAK S': 15.9 CM/S
ECHO RV LONGITUDINAL DIMENSION: 8.3 CM
ECHO RV MID DIMENSION: 2.4 CM
ECHO RV TAPSE: 2.6 CM (ref 1.7–?)
EKG ATRIAL RATE: 65 BPM
EKG ATRIAL RATE: 94 BPM
EKG DIAGNOSIS: NORMAL
EKG DIAGNOSIS: NORMAL
EKG P AXIS: 31 DEGREES
EKG P AXIS: 54 DEGREES
EKG P-R INTERVAL: 222 MS
EKG P-R INTERVAL: 224 MS
EKG Q-T INTERVAL: 406 MS
EKG Q-T INTERVAL: 426 MS
EKG QRS DURATION: 78 MS
EKG QRS DURATION: 84 MS
EKG QTC CALCULATION (BAZETT): 443 MS
EKG QTC CALCULATION (BAZETT): 507 MS
EKG R AXIS: -18 DEGREES
EKG R AXIS: 12 DEGREES
EKG T AXIS: 81 DEGREES
EKG T AXIS: 97 DEGREES
EKG VENTRICULAR RATE: 65 BPM
EKG VENTRICULAR RATE: 94 BPM
GFR SERPLBLD CREATININE-BSD FMLA CKD-EPI: 85 ML/MIN/{1.73_M2}
GLUCOSE SERPL-MCNC: 129 MG/DL (ref 70–99)
HCT VFR BLD AUTO: 37 % (ref 36–48)
HGB BLD-MCNC: 12.4 G/DL (ref 12–16)
MCH RBC QN AUTO: 30.3 PG (ref 26–34)
MCHC RBC AUTO-ENTMCNC: 33.5 G/DL (ref 31–36)
MCV RBC AUTO: 90.6 FL (ref 80–100)
PLATELET # BLD AUTO: 120 K/UL (ref 135–450)
PMV BLD AUTO: 7.6 FL (ref 5–10.5)
POTASSIUM SERPL-SCNC: 4.5 MMOL/L (ref 3.5–5.1)
RBC # BLD AUTO: 4.09 M/UL (ref 4–5.2)
REPORT: NORMAL
RESP PATH DNA+RNA PNL NPH NAA+NON-PROBE: NORMAL
SODIUM SERPL-SCNC: 132 MMOL/L (ref 136–145)
WBC # BLD AUTO: 6.4 K/UL (ref 4–11)

## 2024-12-11 PROCEDURE — 93010 ELECTROCARDIOGRAM REPORT: CPT | Performed by: INTERNAL MEDICINE

## 2024-12-11 PROCEDURE — 93306 TTE W/DOPPLER COMPLETE: CPT | Performed by: INTERNAL MEDICINE

## 2024-12-11 PROCEDURE — 93306 TTE W/DOPPLER COMPLETE: CPT

## 2024-12-11 PROCEDURE — 2580000003 HC RX 258: Performed by: STUDENT IN AN ORGANIZED HEALTH CARE EDUCATION/TRAINING PROGRAM

## 2024-12-11 PROCEDURE — 85027 COMPLETE CBC AUTOMATED: CPT

## 2024-12-11 PROCEDURE — 6370000000 HC RX 637 (ALT 250 FOR IP): Performed by: STUDENT IN AN ORGANIZED HEALTH CARE EDUCATION/TRAINING PROGRAM

## 2024-12-11 PROCEDURE — 2060000000 HC ICU INTERMEDIATE R&B

## 2024-12-11 PROCEDURE — 36415 COLL VENOUS BLD VENIPUNCTURE: CPT

## 2024-12-11 PROCEDURE — 97530 THERAPEUTIC ACTIVITIES: CPT

## 2024-12-11 PROCEDURE — 97165 OT EVAL LOW COMPLEX 30 MIN: CPT

## 2024-12-11 PROCEDURE — 80048 BASIC METABOLIC PNL TOTAL CA: CPT

## 2024-12-11 PROCEDURE — 99222 1ST HOSP IP/OBS MODERATE 55: CPT | Performed by: INTERNAL MEDICINE

## 2024-12-11 PROCEDURE — 93005 ELECTROCARDIOGRAM TRACING: CPT | Performed by: STUDENT IN AN ORGANIZED HEALTH CARE EDUCATION/TRAINING PROGRAM

## 2024-12-11 PROCEDURE — 97161 PT EVAL LOW COMPLEX 20 MIN: CPT

## 2024-12-11 PROCEDURE — 6360000002 HC RX W HCPCS: Performed by: STUDENT IN AN ORGANIZED HEALTH CARE EDUCATION/TRAINING PROGRAM

## 2024-12-11 RX ORDER — ASPIRIN 81 MG/1
81 TABLET ORAL DAILY
Status: DISCONTINUED | OUTPATIENT
Start: 2024-12-11 | End: 2024-12-12 | Stop reason: HOSPADM

## 2024-12-11 RX ORDER — IPRATROPIUM BROMIDE AND ALBUTEROL SULFATE 2.5; .5 MG/3ML; MG/3ML
1 SOLUTION RESPIRATORY (INHALATION) EVERY 4 HOURS PRN
Status: DISCONTINUED | OUTPATIENT
Start: 2024-12-11 | End: 2024-12-12 | Stop reason: HOSPADM

## 2024-12-11 RX ORDER — SODIUM CHLORIDE 1 G/1
1 TABLET ORAL 2 TIMES DAILY WITH MEALS
Status: DISCONTINUED | OUTPATIENT
Start: 2024-12-11 | End: 2024-12-12 | Stop reason: HOSPADM

## 2024-12-11 RX ORDER — NIFEDIPINE 30 MG/1
30 TABLET, EXTENDED RELEASE ORAL DAILY
Status: DISCONTINUED | OUTPATIENT
Start: 2024-12-11 | End: 2024-12-12 | Stop reason: HOSPADM

## 2024-12-11 RX ORDER — CETIRIZINE HYDROCHLORIDE 10 MG/1
5 TABLET ORAL DAILY
Status: DISCONTINUED | OUTPATIENT
Start: 2024-12-11 | End: 2024-12-12 | Stop reason: HOSPADM

## 2024-12-11 RX ADMIN — NIFEDIPINE 30 MG: 30 TABLET, FILM COATED, EXTENDED RELEASE ORAL at 09:30

## 2024-12-11 RX ADMIN — Medication 1 G: at 18:59

## 2024-12-11 RX ADMIN — Medication 1 G: at 10:33

## 2024-12-11 RX ADMIN — ASPIRIN 81 MG: 81 TABLET, COATED ORAL at 10:29

## 2024-12-11 RX ADMIN — CETIRIZINE HYDROCHLORIDE 5 MG: 10 TABLET, FILM COATED ORAL at 11:34

## 2024-12-11 RX ADMIN — ACETAMINOPHEN 650 MG: 325 TABLET ORAL at 18:59

## 2024-12-11 RX ADMIN — METHYLPREDNISOLONE SODIUM SUCCINATE 40 MG: 40 INJECTION INTRAMUSCULAR; INTRAVENOUS at 10:31

## 2024-12-11 RX ADMIN — WATER 1000 MG: 1 INJECTION INTRAMUSCULAR; INTRAVENOUS; SUBCUTANEOUS at 09:00

## 2024-12-11 RX ADMIN — ENOXAPARIN SODIUM 40 MG: 100 INJECTION SUBCUTANEOUS at 09:00

## 2024-12-11 RX ADMIN — DOXYCYCLINE HYCLATE 100 MG: 100 TABLET, COATED ORAL at 09:00

## 2024-12-11 RX ADMIN — DOXYCYCLINE HYCLATE 100 MG: 100 TABLET, COATED ORAL at 20:32

## 2024-12-11 ASSESSMENT — PAIN DESCRIPTION - LOCATION: LOCATION: HEAD

## 2024-12-11 ASSESSMENT — PAIN SCALES - GENERAL
PAINLEVEL_OUTOF10: 0
PAINLEVEL_OUTOF10: 3
PAINLEVEL_OUTOF10: 0

## 2024-12-11 NOTE — PLAN OF CARE
Problem: ABCDS Injury Assessment  Goal: Absence of physical injury  Outcome: Progressing  Flowsheets (Taken 12/11/2024 0051)  Absence of Physical Injury: Implement safety measures based on patient assessment     Problem: Skin/Tissue Integrity  Goal: Absence of new skin breakdown  Description: 1.  Monitor for areas of redness and/or skin breakdown  2.  Assess vascular access sites hourly  3.  Every 4-6 hours minimum:  Change oxygen saturation probe site  4.  Every 4-6 hours:  If on nasal continuous positive airway pressure, respiratory therapy assess nares and determine need for appliance change or resting period.  Outcome: Progressing     Problem: Chronic Conditions and Co-morbidities  Goal: Patient's chronic conditions and co-morbidity symptoms are monitored and maintained or improved  Outcome: Progressing  Flowsheets  Taken 12/11/2024 0051 by Celio Brink RN  Care Plan - Patient's Chronic Conditions and Co-Morbidity Symptoms are Monitored and Maintained or Improved: Monitor and assess patient's chronic conditions and comorbid symptoms for stability, deterioration, or improvement  Problem: Discharge Planning  Goal: Discharge to home or other facility with appropriate resources  Outcome: Progressing  Flowsheets  Taken 12/11/2024 0051 by Celio Brink RN  Discharge to home or other facility with appropriate resources: Identify barriers to discharge with patient and caregiver    Problem: Safety - Adult  Goal: Free from fall injury  Outcome: Progressing     Problem: Neurosensory - Adult  Goal: Achieves stable or improved neurological status  Outcome: Progressing  Flowsheets (Taken 12/11/2024 0051)  Achieves stable or improved neurological status: Assess for and report changes in neurological status

## 2024-12-11 NOTE — PROGRESS NOTES
[facility] - Inpatient Rehabilitation Department   Phone: [phone]    Occupational Therapy    [x] Initial Evaluation            [] Daily Treatment Note         [] Discharge Summary      Patient: Jamilah Jaquez   : 1934   MRN: [id number]   Date of Service:  2024    Admitting Diagnosis:  Acute respiratory failure with hypercapnia  Current Admission Summary: 89 y.o. female with spinal stenosis, chronic back pain, history of PE and DVT, SIADH, hypertension presented from home with shortness of breath   Past Medical History:  has a past medical history of CHF (congestive heart failure) (HCC) and Hypertension.  Past Surgical History:  has a past surgical history that includes Cholecystectomy;  section; and shoulder surgery (Left, 10/12/2022).    Discharge Recommendations: Jamilah Jaquez scored a 13/24 on the AM-PAC ADL Inpatient form.  At this time, no further OT is recommended upon discharge due to pt near baseline.  Recommend patient returns to prior setting with prior services.        DME Required For Discharge: no DME required at discharge    Precautions/Restrictions: high fall risk  Weight Bearing Restrictions: no restrictions    Pre-Admission Information   Lives With: daughter (Works 1/2 day outside of home 3x/week, otherwise works from home)  Type of Home: house  Home Layout: two level, bedroom/bathroom upstairs, able to live on main level--stair lift to 2nd floor  Home Access: 2 step to enter without rails , has portable ramp    Bathroom Layout: walk in shower  Bathroom Equipment: grab bars in shower, shower chair, hand held shower head  Toilet Height: bedside commode  Home Equipment: rolling walker, transport chair  Transfer Assistance: modified independent with use of RW/handrails  Ambulation Assistance: pt reports she does not ambulate any around the house  ADL Assistance: requires assistance with bathing, requires assistance with dressing  IADL Assistance: requires

## 2024-12-11 NOTE — CONSULTS
[facility], [facility] [place]   Electrophysiology   Date: 12/11/2024  Reason for Consultation: AV block   Consult Requesting Physician: Dylan Zarate MD     Chief Complaint   Patient presents with    Shortness of Breath     Pt presents via [facility] w/ cc of SOB. Received 1 breathing treatment in route.        CC: SOB   HPI: Jamilah Jaquez is a 89 y.o. female with h/o PE and DVT, HTN, PVD, DVT, SIADH.    Pt is chair/bedbound d/t previous injury 2 years ago. Followed by palliative care at home.    Pt presented to ED with SOB, daughter noticed she was having trouble breathing and checked O2 sats which were 87% then 81% at second check, called EMS. CT showed bronchiectasis, mucous plugging, and RUL ground glass opacities. Started on antibiotics.    Initial EKG upon presentation read prolonged QTc although this was d/t artifact.    This morning pt had period of 2nd degree AV block type 1 and then went back into sinus rhythm shortly after.    Pt seen sitting in chair. She reports no history of cardiac issues or arrhythmia. Only gets dizzy/lightheaded when standing to transfer. No history of syncope or near syncope. Reports no symptoms today correlating to period of Wenckebach and 2:1 conduction.      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   Telemetry:   Sinus rhythm HR 74    Recent Labs      rhonchi.    Abdominal: Soft. No tenderness   Musculoskeletal: Chronic edema  of RLE  Neurological: Alert and oriented. Follows command    Active Hospital Problems    Diagnosis Date Noted    Acute respiratory failure with hypercapnia [J96.02] 12/10/2024       Past Medical History:   Diagnosis Date    CHF (congestive heart failure) (HCC)     Hypertension         Past Surgical History:   Procedure Laterality Date     SECTION      x3    CHOLECYSTECTOMY      SHOULDER SURGERY Left 10/12/2022    LEFT REVERSE TOTAL SHOULDER ARTHROPLASTY WITH TUBEROSITY FIXATION - TORNIER; REGIONAL BLOCK performed by Jose Clark MD at [facility] OR       Allergies   Allergen Reactions    Azithromycin Hives and Other (See Comments)    Hydrocodone-Acetaminophen Diarrhea, Nausea Only, Other (See Comments) and Nausea And Vomiting    Strawberry (Diagnostic) Hives    Adhesive Tape Other (See Comments)     rash    [place] Other (See Comments)    Tramadol Nausea Only and Other (See Comments)     \"Turned patient into a zombie\"  Turned pt into a zombie          reports that she quit smoking about 74 years ago. Her smoking use included cigarettes. She started smoking about 71 years ago. She has never used smokeless tobacco. She reports that she does not currently use alcohol. She reports that she does not use drugs.

## 2024-12-11 NOTE — PROGRESS NOTES
[facility] - Inpatient Rehabilitation Department   Phone: [phone]    Physical Therapy    [x] Initial Evaluation            [] Daily Treatment Note         [x] Discharge Summary      Patient: Jamilah Jaquez   : 1934   MRN: [id number]   Date of Service:  2024  Admitting Diagnosis: Acute respiratory failure with hypercapnia  Current Admission Summary: In summary, Jamilah Jaquez  is a(n) 89 y.o. female who presents with dyspnea that started about 2 hours prior to arrival with the patient was at rest.  Oxygen saturation mid 80s on room air upon EMS arrival.  Improved after receiving some DuoNebs.  Patient denies any other complaints.  She states that she has right greater than left lower extremity swelling at baseline and that this is unchanged.   Past Medical History:  has a past medical history of CHF (congestive heart failure) (HCC) and Hypertension.  Past Surgical History:  has a past surgical history that includes Cholecystectomy;  section; and shoulder surgery (Left, 10/12/2022).  Discharge Recommendations: Jamilah Jaquez scored a 14/24 on the AM-PAC short mobility form.  At this time, no further PT is recommended upon discharge due to functioning at or near baseline function.  Recommend patient returns to prior setting with prior services.        DME Required For Discharge: patient has all required DME for discharge  Precautions/Restrictions: high fall risk, contact precautions, Isolation precautions  Weight Bearing Restrictions: no restrictions  [] Right Upper Extremity  [] Left Upper Extremity [] Right Lower Extremity  [] Left Lower Extremity     Required Braces/Orthotics: no braces required   [] Right  [] Left  Positional Restrictions:no positional restrictions    Pre-Admission Information   Lives With: daughter (Works 1/2 day outside of home 3x/week, otherwise works from home)  Type of Home: house  Home Layout: two level, bedroom/bathroom upstairs, able to live on  to stand transfer: contact guard assistance  Stand to sit transfer: contact guard assistance  Stand pivot transfer: minimal assistance  Comments: Holding onto walker placed in front of pt, increased time for transfer.  Ambulation:  Ambulation not tested on this date secondary to pt nonambulatory at baseline.  Distance: N/A  Gait Mechanics: N/A  Comments:    Stair Mobility:  Stair mobility not completed on this date.  Comments:  Wheelchair Mobility:  No w/c mobility completed on this date.  Comments:  Balance:  Static Sitting Balance: fair (+): maintains balance at SBA/supervision without use of UE support  Dynamic Sitting Balance: fair (-): maintains balance at CGA with use of UE support  Static Standing Balance: fair (-): maintains balance at CGA with use of UE support  Dynamic Standing Balance: poor (+): requires min (A) to maintain balance  Comments:    Other Therapeutic Interventions    Functional Outcomes  -PAC Inpatient Mobility Raw Score : 14              Cognition  Overall Cognitive Status: Impaired  Arousal/Alertness: delayed responses to stimuli  Following Commands: follows one step commands with repetition, follows one step commands with increased time  Attention Span: difficulty attending to directions, difficulty dividing attention  Memory: decreased recall of recent events, decreased short term memory  Safety Judgement: good awareness of safety precautions  Problem Solving: decreased awareness of errors  Insights: decreased awareness of deficits  Initiation: requires cues for some  Sequencing: requires cues for some  Orientation:    oriented to person, oriented to place, oriented to time, and disoriented to situation  Command Following:   WFL    Education  Barriers To Learning: cognition and physical  Patient Education: patient educated on goals, PT role and benefits, plan of care, general safety, discharge recommendations  Learning Assessment:  patient will require reinforcement due to cognitive

## 2024-12-11 NOTE — CARE COORDINATION
Discharge Planning Note:    Chart reviewed and it appears that patient has minimal needs for discharge at this time. Risk Score 12 %     Primary Care Physician is Reid Mike MD    Primary insurance is Medicare    Please notify case management if any discharge needs are identified.      Case management will continue to follow progress and update discharge plan as needed.       Electronically signed by MALINDA Cabral on 12/11/2024 at 2:50 PM

## 2024-12-11 NOTE — PROGRESS NOTES
12/11/24 0800   RT Protocol   History Pulmonary Disease 0   Respiratory pattern 0   Breath sounds 2   Cough 0   Bronchodilator Assessment Score 2

## 2024-12-11 NOTE — PROGRESS NOTES
Date of Admission: 12/10/2024. Hospital Day: 2       HOSPITAL COURSE   89 y.o. female with spinal stenosis, chronic back pain, history of PE and DVT, SIADH, hypertension presented from home with shortness of breath , admitted for bronchitis/pneumonia    Interval  History:   Patient reports improvement in breathing, remains on oxygen 2 L.   Telemetry this morning shows brief episode of secondary rype II 2:1  block.       Physical Exam     BP (!) 172/46   Pulse 71   Temp 97.7 °F (36.5 °C) (Oral)   Resp 18   Ht 1.651 m (5' 5\")   Wt 79.7 kg (175 lb 12.8 oz)   SpO2 94%   BMI 29.25 kg/m²     General appearance: No apparent distress, appears stated age and cooperative.  Respiratory:  Normal respiratory effort. Clear to auscultation, bilaterally without Rales/Wheezes/Rhonchi.  Cardiovascular: Regular rate and rhythm with normal S1/S2 without murmurs, rubs or gallops.          Assessment/Plan:  Acute respiratory failure with hypercapnia  Likely secondary to underlying bronchiectasis, possible right lung pneumonia  CTA chest personally reviewed no acute PE, bronchiectasis and mucous plugging in right upper lobe, middle and lingula.  Groundglass opacities in right upper lobe.  Continue antibiotic coverage for CAP, steroid  Consult hypercapnia unclear but could have undiagnosed sleep apnea      Second degree type II AV block  EKG shows for 60 AV block but this morning had episode of second-degree type II block.  QTc seems to be improving.  Not on any AV jameel blocking agents  Open echocardiogram, consult cardiology    History of PE and DVT  Not on anticoagulation at home anymore    Chronic bilateral lower extremity edema  R>L    Hypertension.  Continue quetiapine, blood pressure mildly elevated this morning    Active Hospital Problems    Diagnosis     Acute respiratory failure with hypercapnia [J96.02]            DVT Prophylaxis:    Diet: ADULT DIET; Regular  Code Status: DNR-CCA      Dispo - home .  Expected DC

## 2024-12-12 VITALS
HEIGHT: 67 IN | RESPIRATION RATE: 16 BRPM | HEART RATE: 66 BPM | OXYGEN SATURATION: 96 % | TEMPERATURE: 97.5 F | DIASTOLIC BLOOD PRESSURE: 67 MMHG | WEIGHT: 175.9 LBS | SYSTOLIC BLOOD PRESSURE: 134 MMHG | BODY MASS INDEX: 27.61 KG/M2

## 2024-12-12 PROBLEM — R06.03 RESPIRATORY DISTRESS: Status: ACTIVE | Noted: 2024-12-12

## 2024-12-12 PROBLEM — I44.1 2ND DEGREE ATRIOVENTRICULAR BLOCK: Status: ACTIVE | Noted: 2024-12-12

## 2024-12-12 PROBLEM — R79.89 ELEVATED TROPONIN: Status: ACTIVE | Noted: 2024-12-12

## 2024-12-12 LAB
ANION GAP SERPL CALCULATED.3IONS-SCNC: 7 MMOL/L (ref 3–16)
BUN SERPL-MCNC: 16 MG/DL (ref 7–20)
CALCIUM SERPL-MCNC: 9.1 MG/DL (ref 8.3–10.6)
CHLORIDE SERPL-SCNC: 97 MMOL/L (ref 99–110)
CO2 SERPL-SCNC: 29 MMOL/L (ref 21–32)
CREAT SERPL-MCNC: 0.5 MG/DL (ref 0.6–1.2)
DEPRECATED RDW RBC AUTO: 14.9 % (ref 12.4–15.4)
GFR SERPLBLD CREATININE-BSD FMLA CKD-EPI: 89 ML/MIN/{1.73_M2}
GLUCOSE SERPL-MCNC: 87 MG/DL (ref 70–99)
HCT VFR BLD AUTO: 37.4 % (ref 36–48)
HGB BLD-MCNC: 12.6 G/DL (ref 12–16)
MCH RBC QN AUTO: 30.4 PG (ref 26–34)
MCHC RBC AUTO-ENTMCNC: 33.6 G/DL (ref 31–36)
MCV RBC AUTO: 90.3 FL (ref 80–100)
PLATELET # BLD AUTO: 116 K/UL (ref 135–450)
PMV BLD AUTO: 7.2 FL (ref 5–10.5)
POTASSIUM SERPL-SCNC: 4.3 MMOL/L (ref 3.5–5.1)
RBC # BLD AUTO: 4.14 M/UL (ref 4–5.2)
SODIUM SERPL-SCNC: 133 MMOL/L (ref 136–145)
WBC # BLD AUTO: 6.3 K/UL (ref 4–11)

## 2024-12-12 PROCEDURE — 6370000000 HC RX 637 (ALT 250 FOR IP): Performed by: STUDENT IN AN ORGANIZED HEALTH CARE EDUCATION/TRAINING PROGRAM

## 2024-12-12 PROCEDURE — 94680 O2 UPTK RST&XERS DIR SIMPLE: CPT

## 2024-12-12 PROCEDURE — 6360000002 HC RX W HCPCS: Performed by: STUDENT IN AN ORGANIZED HEALTH CARE EDUCATION/TRAINING PROGRAM

## 2024-12-12 PROCEDURE — 80048 BASIC METABOLIC PNL TOTAL CA: CPT

## 2024-12-12 PROCEDURE — 36415 COLL VENOUS BLD VENIPUNCTURE: CPT

## 2024-12-12 PROCEDURE — 85027 COMPLETE CBC AUTOMATED: CPT

## 2024-12-12 PROCEDURE — 2580000003 HC RX 258: Performed by: STUDENT IN AN ORGANIZED HEALTH CARE EDUCATION/TRAINING PROGRAM

## 2024-12-12 RX ORDER — PREDNISONE 20 MG/1
20 TABLET ORAL DAILY
Qty: 5 TABLET | Refills: 0 | Status: SHIPPED | OUTPATIENT
Start: 2024-12-12 | End: 2024-12-17

## 2024-12-12 RX ORDER — BUDESONIDE AND FORMOTEROL FUMARATE DIHYDRATE 160; 4.5 UG/1; UG/1
2 AEROSOL RESPIRATORY (INHALATION) 2 TIMES DAILY
Qty: 30.6 G | Refills: 0 | Status: SHIPPED | OUTPATIENT
Start: 2024-12-12

## 2024-12-12 RX ORDER — DOXYCYCLINE HYCLATE 100 MG
100 TABLET ORAL 2 TIMES DAILY
Qty: 14 TABLET | Refills: 0 | Status: SHIPPED | OUTPATIENT
Start: 2024-12-12 | End: 2024-12-19

## 2024-12-12 RX ADMIN — ASPIRIN 81 MG: 81 TABLET, COATED ORAL at 08:04

## 2024-12-12 RX ADMIN — Medication 1 G: at 08:04

## 2024-12-12 RX ADMIN — DOXYCYCLINE HYCLATE 100 MG: 100 TABLET, COATED ORAL at 08:05

## 2024-12-12 RX ADMIN — WATER 1000 MG: 1 INJECTION INTRAMUSCULAR; INTRAVENOUS; SUBCUTANEOUS at 08:03

## 2024-12-12 RX ADMIN — NIFEDIPINE 30 MG: 30 TABLET, FILM COATED, EXTENDED RELEASE ORAL at 08:04

## 2024-12-12 RX ADMIN — ENOXAPARIN SODIUM 40 MG: 100 INJECTION SUBCUTANEOUS at 08:04

## 2024-12-12 RX ADMIN — CETIRIZINE HYDROCHLORIDE 5 MG: 10 TABLET, FILM COATED ORAL at 08:05

## 2024-12-12 ASSESSMENT — PAIN SCALES - GENERAL: PAINLEVEL_OUTOF10: 0

## 2024-12-12 NOTE — DISCHARGE INSTRUCTIONS
Your information:  Name: Jamilah Jaquez  : 1934    Your Discharge Instructions    What to do after you leave the hospital:    Read, review and familiarize yourself with the information provided below and in a separate packet on Shortness of Breath; Respiratory Failure     Diet: General     Recommended activity: As tolerated  Avoid strenuous activity until instructed by your physician to resume; balance rest with periods of light to normal activity.     If you experience any of the following: Unusual or inadequately controlled pain; unusual transient shortness of breath; recurrent or persistent nausea, heartburn, palpitations or lightheadedness; increased swelling; increased fatigue; fever >100; please follow up with @PCP@ [unfilled] or go to the Emergency Room.      Home Health/ Outpatient Services:       Information obtained by:  By signing below, I understand and acknowledge receipt of the instructions indicated above, and I understand that if any problems occur once I leave the hospital I am to contact @[email or web address]@.

## 2024-12-12 NOTE — PLAN OF CARE
Problem: ABCDS Injury Assessment  Goal: Absence of physical injury  Outcome: Progressing     Problem: Skin/Tissue Integrity  Goal: Absence of new skin breakdown  Description: 1.  Monitor for areas of redness and/or skin breakdown  2.  Assess vascular access sites hourly  3.  Every 4-6 hours minimum:  Change oxygen saturation probe site  4.  Every 4-6 hours:  If on nasal continuous positive airway pressure, respiratory therapy assess nares and determine need for appliance change or resting period.  Outcome: Progressing     Problem: Chronic Conditions and Co-morbidities  Goal: Patient's chronic conditions and co-morbidity symptoms are monitored and maintained or improved  Outcome: Progressing     Problem: Discharge Planning  Goal: Discharge to home or other facility with appropriate resources  Outcome: Progressing     Problem: Safety - Adult  Goal: Free from fall injury  Outcome: Progressing     Problem: Neurosensory - Adult  Goal: Achieves stable or improved neurological status  Outcome: Progressing     Problem: Respiratory - Adult  Goal: Achieves optimal ventilation and oxygenation  Outcome: Progressing     Problem: Cardiovascular - Adult  Goal: Absence of cardiac dysrhythmias or at baseline  Outcome: Progressing     Problem: Musculoskeletal - Adult  Goal: Return mobility to safest level of function  Outcome: Progressing     Problem: Infection - Adult  Goal: Absence of infection at discharge  Outcome: Progressing

## 2024-12-12 NOTE — CARE COORDINATION
12/12/24 1422   IMM Letter   IMM Letter given to Patient/Family/Significant other/Guardian/POA/by: CM Admin went to the room several times unable to see pt. Patient d/c did not receive IMM   IMM Letter date given: 12/12/24   IMM Letter time given: 1424

## 2024-12-12 NOTE — PROGRESS NOTES
12/12/24 1110   Resting (Room Air)   SpO2 92   HR 69   After Walk   Does the Patient Qualify for Home O2 No   Does the Patient Need Portable Oxygen Tanks No     Pt is non ambulatory at baseline.

## 2024-12-12 NOTE — PLAN OF CARE
Problem: ABCDS Injury Assessment  Goal: Absence of physical injury  Outcome: Progressing  Flowsheets (Taken 12/11/2024 2035)  Absence of Physical Injury: Implement safety measures based on patient assessment     Problem: Skin/Tissue Integrity  Goal: Absence of new skin breakdown  Description: 1.  Monitor for areas of redness and/or skin breakdown  2.  Assess vascular access sites hourly  3.  Every 4-6 hours minimum:  Change oxygen saturation probe site  4.  Every 4-6 hours:  If on nasal continuous positive airway pressure, respiratory therapy assess nares and determine need for appliance change or resting period.  Outcome: Progressing     Problem: Chronic Conditions and Co-morbidities  Goal: Patient's chronic conditions and co-morbidity symptoms are monitored and maintained or improved  Outcome: Progressing  Flowsheets (Taken 12/11/2024 2035)  Care Plan - Patient's Chronic Conditions and Co-Morbidity Symptoms are Monitored and Maintained or Improved: Monitor and assess patient's chronic conditions and comorbid symptoms for stability, deterioration, or improvement     Problem: Discharge Planning  Goal: Discharge to home or other facility with appropriate resources  Outcome: Progressing  Flowsheets (Taken 12/11/2024 2035)  Discharge to home or other facility with appropriate resources: Identify barriers to discharge with patient and caregiver     Problem: Safety - Adult  Goal: Free from fall injury  Outcome: Progressing     Problem: Neurosensory - Adult  Goal: Achieves stable or improved neurological status  Outcome: Progressing  Flowsheets (Taken 12/11/2024 2035)  Achieves stable or improved neurological status: Assess for and report changes in neurological status     Problem: Respiratory - Adult  Goal: Achieves optimal ventilation and oxygenation  Outcome: Progressing  Flowsheets (Taken 12/11/2024 2035)  Achieves optimal ventilation and oxygenation: Assess for changes in respiratory status     Problem:

## 2024-12-12 NOTE — PROGRESS NOTES
CLINICAL PHARMACY NOTE: MEDS TO BEDS    Total # of Prescriptions Filled: 4   The following medications were delivered to the patient:  PREDNISONE 20MG TABS  DOXYCYCLINE HYCLATE 100MG TABS  AMOXICILLIN - POTCLAVUL 875 - 125 TABS  BUDESONIDE - FORMOTEROL 160 - 4.5AERO    Additional Documentation: Patient picked up=signed  [facility]

## 2024-12-12 NOTE — CARE COORDINATION
Case Management -  Discharge Note      Patient Name: Jamilah Jaquez                   YOB: 1934  Room: [unfilled]            Readmission Risk (Low < 19, Mod (19-27), High > 27): Readmission Risk Score: 12.2    Current PCP: Reid Mike MD      (IMM) Important Message from Medicare:    Has pt received appropriate compliance notices before being discharged if required: N/A  Compliance doc:  [] 2nd IMM; [] Code 44 [] Moon  Date: NA    PT AM-PAC: 14 /24  OT AM-PAC: 13 /24    Financial    Payor: MEDICARE / Plan: MEDICARE PART A AND B / Product Type: *No Product type* /     Pharmacy:  Potential assistance Purchasing Medications: No  Meds-to-Beds request: Yes      [facility] - [place], [place] - [address] - P [phone] - F [phone]  [address] [address]  Phone: [phone] Fax: [phone]      Notes:    Additional Case Management Notes: Pt does not require anything upon DC. Does not require O2 and is at baseline.

## 2024-12-12 NOTE — DISCHARGE INSTR - COC
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 10/17/22  Dixon Ambrose, RN                           Nurse Assessment:  Last Vital Signs: /67   Pulse 66   Temp 97.5 °F (36.4 °C) (Oral)   Resp 16   Ht 1.702 m (5' 7\")   Wt 79.8 kg (175 lb 14.4 oz)   SpO2 96%   BMI 27.55 kg/m²     Last documented pain score (0-10 scale): Pain Level: 0  Last Weight:   Wt Readings from Last 1 Encounters:   12/12/24 79.8 kg (175 lb 14.4 oz)     Mental Status:  {IP PT MENTAL STATUS:20030}    IV Access:  { QUIQUE IV ACCESS:563193502}    Nursing Mobility/ADLs:  Walking   {CHP DME ADLs:522279538}  Transfer  {CHP DME ADLs:236015197}  Bathing  {CHP DME ADLs:969528910}  Dressing  {CHP DME ADLs:681228839}  Toileting  {CHP DME ADLs:610405414}  Feeding  {CHP DME ADLs:878290106}  Med Admin  {P DME ADLs:892514591}  Med Delivery   { QUIQUE MED Delivery:908470073}    Wound Care Documentation and Therapy:  Wound 10/19/22 Other (Comment) Right calloused area with redness and purple (Active)   Number of days: 785       Incision 10/12/22 Shoulder Left (Active)   Number of days: 791        Elimination:  Continence:   Bowel: {YES / NO:19727}  Bladder: {YES / NO:19727}  Urinary Catheter: {Urinary Catheter:961567881}   Colostomy/Ileostomy/Ileal Conduit: {YES / NO:19727}       Date of Last BM: ***    Intake/Output Summary (Last 24 hours) at 12/12/2024 1206  Last data filed at 12/12/2024 0402  Gross per 24 hour   Intake 600 ml   Output 1700 ml   Net -1100 ml     I/O last 3 completed shifts:  In: 1320 [P.O.:1320]  Out: 2000 [Urine:2000]    Safety Concerns:     { QUIQUE Safety Concerns:541061273}    Impairments/Disabilities:      { QUIQUE Impairments/Disabilities:061813603}    Nutrition Therapy:  Current Nutrition Therapy:   { QUIQUE Diet List:152468897}    Routes of Feeding: {CHP DME Other Feedings:482481485}  Liquids: {Slp liquid thickness:71034}  Daily Fluid Restriction: {P DME Yes amt  example:934745054}  Last Modified Barium Swallow with Video (Video Swallowing Test): {Done Not Done Date:}    Treatments at the Time of Hospital Discharge:   Respiratory Treatments: ***  Oxygen Therapy:  {Therapy; copd oxygen:91939}  Ventilator:    {[facility] Vent List:251954452}    Rehab Therapies: {THERAPEUTIC INTERVENTION:9864325300}  Weight Bearing Status/Restrictions: {[facility] Weight Bearin}  Other Medical Equipment (for information only, NOT a DME order):  {EQUIPMENT:596154890}  Other Treatments: ***    Patient's personal belongings (please select all that are sent with patient):  {[facility] DME Belongings:391901430}    RN SIGNATURE:  {Esignature:944390851}    CASE MANAGEMENT/SOCIAL WORK SECTION    Inpatient Status Date: ***    Readmission Risk Assessment Score:  Readmission Risk               RISK OF UNPLANNED READMISSION:  0           Discharging to Facility/ Agency   Name:   Address:  Phone:  Fax:    Dialysis Facility (if applicable)   Name:  Address:  Dialysis Schedule:  Phone:  Fax:    / signature: {Esignature:981076751}    PHYSICIAN SECTION    Prognosis: {Prognosis:9885272268}    Condition at Discharge: { Patient Condition:301978477}    Rehab Potential (if transferring to Rehab): {Prognosis:3662124862}    Recommended Labs or Other Treatments After Discharge: ***    Physician Certification: I certify the above information and transfer of Jamilah Jaquez  is necessary for the continuing treatment of the diagnosis listed and that she requires {Admit to Appropriate Level of Care:52721} for {GREATER/LESS:082959095} 30 days.     Update Admission H&P: {CHP DME Changes in HandP:705792865}    PHYSICIAN SIGNATURE:  {Esignature:213384944}

## 2024-12-14 NOTE — DISCHARGE SUMMARY
amoxicillin-clavulanate 875-125 MG per tablet  Commonly known as: AUGMENTIN  Take 1 tablet by mouth 2 times daily for 5 days  Notes to patient: Use: to treat infection  Side effects: stomach upset, Notify physician of following: rash, itching, skin redness     budesonide-formoterol 160-4.5 MCG/ACT Aero  Commonly known as: Symbicort  Inhale 2 puffs into the lungs 2 times daily  Notes to patient: Use: treatment of colitis and Crohn's disease  Side effects: fatigue, dizziness, stomach upset, ankle swelling, bruise easily        doxycycline hyclate 100 MG tablet  Commonly known as: VIBRA-TABS  Take 1 tablet by mouth 2 times daily for 7 days  Notes to patient: Use: for infection  Side effects: Bloating, constipation, diarrhea, cough, decreased appetite    Finish all of this medication     predniSONE 20 MG tablet  Commonly known as: DELTASONE  Take 1 tablet by mouth daily for 5 days  Notes to patient: Use: for inflammation, open respiratory airways  Side effects: increased blood sugar, increased appetite, water retention, weight gain, headache            CHANGE how you take these medications      NIFEdipine 30 MG extended release tablet  Commonly known as: PROCARDIA XL  What changed: Another medication with the same name was removed. Continue taking this medication, and follow the directions you see here.  Notes to patient: Use: to treat high blood pressure  Side effects: dizziness, irregular and/or racing heartbeat, cough            CONTINUE taking these medications      aspirin 81 MG EC tablet  Notes to patient: Aspirin  Use: Prevents heart attacks & strokes.  Side effects: bleeding or bruising more easily, upset stomach.     b complex vitamins capsule     calcium carbonate 500 MG chewable tablet  Commonly known as: TUMS  Notes to patient: Use: Calcium supplement  Side effects: stomach upset     diazePAM 5 MG tablet  Commonly known as: VALIUM  Notes to patient: Diazepam (Valium)  Use: ease anxiety, calm the  muscles  Side effects: feeling lightheaded, sleepy, having blurred eye sight, confusion     furosemide 20 MG tablet  Commonly known as: LASIX  Take 0.5 tablets by mouth daily  Notes to patient: USE: \"water pill\" reduces swelling, fluid accumulation, blood pressure    Side Effects: lightheadedness, dehydration, potassium loss     loratadine 10 MG tablet  Commonly known as: CLARITIN  Notes to patient: Use: for allergy symptoms  Side effects: headache, dry mouth, fatigue dizziness     potassium chloride 10 MEQ extended release tablet  Commonly known as: KLOR-CON  Notes to patient: Potassium chloride/ Klor-Con  Use: restore potassium in your body  Side effects: stomach upset     sodium chloride 1 g tablet  Take 1 tablet by mouth 3 times daily (with meals) for 14 days            ASK your doctor about these medications      docusate sodium 100 MG capsule  Commonly known as: COLACE  Notes to patient: Docusate Sodium (Colace*)  Use: stool softener, prevents or treats constipation  Side effects: usually none     magnesium oxide 400 MG tablet  Commonly known as: MAG-OX  Notes to patient: Use:Dietary Supplement  Side effects: Nausea, diarrhea               Where to Get Your Medications        These medications were sent to [facility] [address] - P [phone] - F [phone]  [address] [facility] [address]      Phone: [phone]   amoxicillin-clavulanate 875-125 MG per tablet  budesonide-formoterol 160-4.5 MCG/ACT Aero  doxycycline hyclate 100 MG tablet  predniSONE 20 MG tablet         34 Minutes spent on patient evaluation, counseling and discharge planning.     Signed:  Dylan Zarate MD  12/14/2024, 4:51 PM **

## 2024-12-16 NOTE — PROGRESS NOTES
Physician Progress Note      PATIENT:               HATTIE JERRY  [facility] #:                  [id number]  :                       1934  ADMIT DATE:       12/10/2024 3:13 AM  DISCH DATE:        2024 2:00 PM  RESPONDING  PROVIDER #:        Dylan Zarate MD          QUERY TEXT:    Patient admitted with Acute respiratory failure with hypercapnia in H&P on   12/10. In order to support the diagnosis of acute respiratory failure, please   include additional clinical indicators in your documentation.  Or please   document if the diagnosis of acute respiratory failure has been ruled out   after further study.    The medical record reflects the following:  Risk Factors: brochitasis, possible right lung pneumonia  Clinical Indicators: ED 12/10-Respiratory distress, Breath sounds: Rales   present.  H&P 12/10- Acute respiratory failure with hypercapnia, Likely secondary to   underlying bronchiectasis, possible right lung pneumonia, CTA chest personally   reviewed no acute PE, bronchiectasis and mucous plugging in right upper lobe,   middle and lingula. Groundglass opacities in right upper lobe. CTA chest   personally reviewed no acute PE, bronchiectasis and mucous plugging in right   upper lobe, middle and lingula. Groundglass opacities in right upper lobe.   Respiratory: Scattered bilateral rhonchi.  Electrophysiology CN  Pt presented to ED with SOB, daughter noticed she   was having trouble breathing and checked O2 sats which were 87% then 81% at   second check, called EMS.  SPO2 (12/10-)- 100, 98, 97, 96, 98, 95, 93, 96, 95, 94, 93, 95, 97  VBG: pO2- 35.0, 187.0  pCO2- 70.4, 43.2  pH- 7.297, 7.448  Treatment: Bipap,3L nasal cannula, Scheduled DuoNebs, IV Solu-Medrol, IV   Rocephin and doxycycline, Check respiratory viral panel, XR chest.    Thank you,  GINGER Lennon CDS  Options provided:  -- Acute Respiratory Failure as evidenced by, Please document evidence.  -- Acute Respiratory Failure ruled

## (undated) DEVICE — COVER C ARM MINI

## (undated) DEVICE — ELECTRODE PT RET AD L9FT HI MOIST COND ADH HYDRGEL CORDED

## (undated) DEVICE — SHEET,DRAPE,53X77,STERILE: Brand: MEDLINE

## (undated) DEVICE — SUTURE ETHBND EXCEL SZ 2 L30IN NONABSORBABLE GRN L40MM V-37 MX69G

## (undated) DEVICE — SUTURE MCRYL + SZ 3-0 L27IN ABSRB UD PS1 L24MM 3/8 CIR REV MCP936H

## (undated) DEVICE — 3M™ IOBAN™ 2 ANTIMICROBIAL INCISE DRAPE 6650EZ: Brand: IOBAN™ 2

## (undated) DEVICE — APPLICATOR MEDICATED 26 CC SOLUTION HI LT ORNG CHLORAPREP

## (undated) DEVICE — POSITIONER HD W8XH4XL8.5IN RASPBERRY FOAM SLT

## (undated) DEVICE — DRAPE C ARM LF 16X85

## (undated) DEVICE — MERCY FAIRFIELD TURNOVER KIT: Brand: MEDLINE INDUSTRIES, INC.

## (undated) DEVICE — DRAPE,U/ SHT,SPLIT,PLAS,STERIL: Brand: MEDLINE

## (undated) DEVICE — PACK PROCEDURE SURG SHLDR MFFOP CUST

## (undated) DEVICE — BANDAGE COBAN 4 IN COMPR W4INXL5YD FOAM COHESIVE QUIK STK SELF ADH SFT

## (undated) DEVICE — SUTURE VCRL + SZ 1 L18IN ABSRB UD L36MM CT-1 1/2 CIR VCP841D

## (undated) DEVICE — 2108 SERIES SAGITTAL BLADE, NO OFFSET  (12.4 X 1.19 X 82.1MM)

## (undated) DEVICE — T-MAX DISPOSABLE FACE MASK 8 PER BOX

## (undated) DEVICE — DRESSING WND ISLAND 4X8 IN ANTIMICROBIAL BARR THERABOND 3D

## (undated) DEVICE — 3 BONE CEMENT MIXER: Brand: MIXEVAC

## (undated) DEVICE — 3M™ STERI-DRAPE™ U-DRAPE 1015: Brand: STERI-DRAPE™

## (undated) DEVICE — SOLUTION IV IRRIG WATER 1000ML POUR BRL 2F7114

## (undated) DEVICE — STAPLER SKIN H3.9MM WIRE DIA0.58MM CRWN 6.9MM 35 STPL ROT

## (undated) DEVICE — HANDPIECE SET WITH HIGH FLOW TIP AND SUCTION TUBE: Brand: INTERPULSE

## (undated) DEVICE — SUTURE ETHBND EXCEL SZ 5 L30IN NONABSORBABLE GRN L48MM V-40 MB46G

## (undated) DEVICE — 3M™ COBAN™ NL STERILE NON-LATEX SELF-ADHERENT WRAP, 2086S, 6 IN X 5 YD (15 CM X 4,5 M), 12 ROLLS/CASE: Brand: 3M™ COBAN™

## (undated) DEVICE — PENCIL SMK EVAC TELSCP 3 M TBNG

## (undated) DEVICE — BIT DRL SCREW 3.2 MM PERIPH STRL

## (undated) DEVICE — MAJOR SET UP PK

## (undated) DEVICE — GUIDEPIN ORTH 2.5X220 MM SHLDR AEQUALIS PERFORM+
Type: IMPLANTABLE DEVICE | Site: SHOULDER | Status: NON-FUNCTIONAL
Removed: 2022-10-12

## (undated) DEVICE — Device

## (undated) DEVICE — SUTURE VCRL + SZ 2-0 L18IN ABSRB UD CT1 L36MM 1/2 CIR VCP839D

## (undated) DEVICE — GAUZE,SPONGE,4"X4",8PLY,STRL,LF,10/TRAY: Brand: MEDLINE

## (undated) DEVICE — PROVE COVER: Brand: UNBRANDED

## (undated) DEVICE — 2108 SERIES SAGITTAL BLADE (24.8 X 0.88 X 73.8MM)

## (undated) DEVICE — 450 ML BOTTLE OF 0.05% CHLORHEXIDINE GLUCONATE IN 99.95% STERILE WATER FOR IRRIGATION, USP AND APPLICATOR.: Brand: IRRISEPT ANTIMICROBIAL WOUND LAVAGE

## (undated) DEVICE — SOLUTION IRRIG 1000ML 0.9% SOD CHL USP POUR PLAS BTL

## (undated) DEVICE — DRAPE,SHOULDER,BEACH CHAIR,STERILE: Brand: MEDLINE

## (undated) DEVICE — Z DISCONTINUED PER MEDLINE (LOW STOCK)  USE 2422770 DRAPE C ARM W54XL78IN FOR FLROSCN

## (undated) DEVICE — GAUZE,SPONGE,4"X4",16PLY,XRAY,STRL,LF: Brand: MEDLINE

## (undated) DEVICE — SOLUTION IRRIG 2000ML 0.9% SOD CHL USP UROMATIC PLAS CONT

## (undated) DEVICE — COVER,MAYO STAND,XL,STERILE: Brand: MEDLINE

## (undated) DEVICE — SYRINGE,TOOMEY,IRRIGATION,70CC,STERILE: Brand: MEDLINE

## (undated) DEVICE — SLING ORTH ADJ STRP UNIV 54X5 IN SHLDR W/ SWTH

## (undated) DEVICE — SHOULDER STABILIZATION KIT,                                    DISPOSABLE 12 PER BOX